# Patient Record
Sex: FEMALE | Race: BLACK OR AFRICAN AMERICAN | Employment: OTHER | URBAN - METROPOLITAN AREA
[De-identification: names, ages, dates, MRNs, and addresses within clinical notes are randomized per-mention and may not be internally consistent; named-entity substitution may affect disease eponyms.]

---

## 2017-02-09 ENCOUNTER — TELEPHONE (OUTPATIENT)
Dept: INTERNAL MEDICINE CLINIC | Age: 62
End: 2017-02-09

## 2017-02-09 NOTE — TELEPHONE ENCOUNTER
Pt's lab were re-ordered in October. [facility] reqs are good for 6 months. Spoke to pt and advised of this information. She verbalized understanding and will be going Jan 16th to have them completed.

## 2017-02-09 NOTE — TELEPHONE ENCOUNTER
Sol Mai 1955     Pt needs a new order for lab work the order she has is .  Pt states that she will be getting her labs done on the

## 2017-03-16 ENCOUNTER — OFFICE VISIT (OUTPATIENT)
Dept: INTERNAL MEDICINE CLINIC | Age: 62
End: 2017-03-16

## 2017-03-16 VITALS
RESPIRATION RATE: 16 BRPM | HEART RATE: 87 BPM | HEIGHT: 63 IN | WEIGHT: 170.2 LBS | SYSTOLIC BLOOD PRESSURE: 130 MMHG | OXYGEN SATURATION: 98 % | TEMPERATURE: 97.9 F | BODY MASS INDEX: 30.16 KG/M2 | DIASTOLIC BLOOD PRESSURE: 88 MMHG

## 2017-03-16 DIAGNOSIS — E78.5 HYPERLIPIDEMIA LDL GOAL <100: ICD-10-CM

## 2017-03-16 DIAGNOSIS — E11.29 UNCONTROLLED TYPE 2 DIABETES MELLITUS WITH MICROALBUMINURIA, UNSPECIFIED LONG TERM INSULIN USE STATUS: Primary | ICD-10-CM

## 2017-03-16 DIAGNOSIS — E11.65 UNCONTROLLED TYPE 2 DIABETES MELLITUS WITH MICROALBUMINURIA, UNSPECIFIED LONG TERM INSULIN USE STATUS: Primary | ICD-10-CM

## 2017-03-16 DIAGNOSIS — E11.29 PERSISTENT MICROALBUMINURIA ASSOCIATED WITH TYPE 2 DIABETES MELLITUS (HCC): ICD-10-CM

## 2017-03-16 DIAGNOSIS — R80.9 UNCONTROLLED TYPE 2 DIABETES MELLITUS WITH MICROALBUMINURIA, UNSPECIFIED LONG TERM INSULIN USE STATUS: Primary | ICD-10-CM

## 2017-03-16 DIAGNOSIS — I10 BENIGN HYPERTENSION: ICD-10-CM

## 2017-03-16 DIAGNOSIS — R80.9 PERSISTENT MICROALBUMINURIA ASSOCIATED WITH TYPE 2 DIABETES MELLITUS (HCC): ICD-10-CM

## 2017-03-16 LAB
ALBUMIN UR QL STRIP: 30 MG/L
CREATININE, URINE POC: 200 MG/DL
MICROALBUMIN/CREAT RATIO POC: <30 MG/G

## 2017-03-16 RX ORDER — METFORMIN HYDROCHLORIDE 1000 MG/1
1000 TABLET ORAL 2 TIMES DAILY WITH MEALS
Qty: 180 TAB | Refills: 1 | Status: SHIPPED | OUTPATIENT
Start: 2017-03-16 | End: 2017-08-26 | Stop reason: SDUPTHER

## 2017-03-16 RX ORDER — METFORMIN HYDROCHLORIDE 1000 MG/1
1000 TABLET ORAL 2 TIMES DAILY WITH MEALS
Qty: 60 TAB | Refills: 0 | Status: SHIPPED | OUTPATIENT
Start: 2017-03-16 | End: 2017-04-15

## 2017-03-16 RX ORDER — PRAVASTATIN SODIUM 80 MG/1
80 TABLET ORAL
Qty: 90 TAB | Refills: 0 | Status: SHIPPED | OUTPATIENT
Start: 2017-03-16 | End: 2017-05-28 | Stop reason: SDUPTHER

## 2017-03-16 NOTE — PATIENT INSTRUCTIONS
Diabetic Nephropathy: Care Instructions  Your Care Instructions  Finding out that your kidneys have been damaged can be very distressing. It may have taken you by surprise, since damage to kidneys usually does not cause symptoms early on. It is normal to feel upset and afraid. Having diabetic nephropathy means that for some time you have had high blood sugar, which damages the kidneys. Healthy kidneys keep protein in your blood, where it belongs. Damaged kidneys do not work the way they should. Your kidneys are letting protein pass into your urine. Sometimes diabetic kidney disease can lead to kidney failure. Your doctor will tell you how you might be able to slow damage to your kidneys. In many cases, prompt and regular treatment can prevent kidney failure. You will need to take medicine and may need to make a number of changes in your normal routines. If you can keep your blood sugar and blood pressure under control and take certain medicines, you may reduce your chance of kidney failure. Follow-up care is a key part of your treatment and safety. Be sure to make and go to all appointments, and call your doctor if you are having problems. It's also a good idea to know your test results and keep a list of the medicines you take. How can you care for yourself at home? · Take your medicines exactly as prescribed. It is very important that you take your insulin or other diabetes medicine as your doctor tells you. Call your doctor if you think you are having a problem with your medicine. · Try to keep blood sugar in your target range. ¨ Eat a variety of foods, with carbohydrate spread out in your meals. Your doctor may restrict your protein. A dietitian can help you plan meals. ¨ If your doctor recommends it, get more exercise. Walking is a good choice. Bit by bit, increase the amount you walk every day. Try for at least 30 minutes on most days of the week.   ¨ Check your blood sugar as often as your doctor recommends. · Take and record your blood pressure at home if your doctor tells you to. Learn the importance of the two measures of blood pressure (such as 130 over 80, or 130/80). To take your blood pressure at home:  ¨ Ask your doctor to check your blood pressure monitor. He or she can make sure that it is accurate and that the cuff fits you. Also ask your doctor to watch you to make sure that you are using it right. ¨ Do not eat, use tobacco products, or use medicine known to raise blood pressure (such as some nasal decongestant sprays) before taking your blood pressure. ¨ Avoid taking your blood pressure if you have just exercised or are nervous or upset. Rest at least 15 minutes before taking a reading. · Eat a low-salt diet to help keep your blood pressure in your target range. · Do not smoke. Smoking raises your risk of many health problems, including diabetic nephropathy. If you need help quitting, talk to your doctor about stop-smoking programs and medicines. These can increase your chances of quitting for good. · Do not take ibuprofen, naproxen, or similar medicines, unless your doctor tells you to. These medicines may make kidney problems worse. When should you call for help? Call 911 anytime you think you may need emergency care. For example, call if:  · You passed out (lost consciousness). Call your doctor now or seek immediate medical care if:  · You have not urinated at all in the last 24 hours. · You have trouble urinating or can urinate only very small amounts. · You are confused or have trouble thinking clearly. · You are very thirsty, lightheaded, or dizzy, or you feel like you may pass out (lose consciousness). · You have a weak, fast heartbeat. · You have swelling in your hands or feet. · You have blood in your urine. · You are extremely tired or weak. Watch closely for changes in your health, and be sure to contact your doctor if you have any problems.   Where can you learn more?  Go to http://shira-romie.info/. Enter P361 in the search box to learn more about \"Diabetic Nephropathy: Care Instructions. \"  Current as of: April 5, 2016  Content Version: 11.1  © 6023-7632 [employer] Pauer - 3P. Care instructions adapted under license by [employer] (which disclaims liability or warranty for this information). If you have questions about a medical condition or this instruction, always ask your healthcare professional. [address] any warranty or liability for your use of this information.

## 2017-03-16 NOTE — PROGRESS NOTES
Chief Complaint   Patient presents with    Diabetes     f/u    Hypertension     f/u     Pt here for follow up.

## 2017-03-16 NOTE — PROGRESS NOTES
HPI:  Anuj Lopez is a 65y.o. year old female who returns to clinic today for routine follow up appointment to discuss the issues below:    She is here for diabetic and htn follow up. Complication - microalbuminuria. No known retinopathy. Mild neuropathy. Reports non adherence to meds - Lantus and Metformin. Hasn't filled in a long while, didn't call for refills after apparently she needed to change to mail order (?). It may have been denied due to lack of lab work/ f/u. She has trouble getting to [facility] in am due to her job - had to take a vacation day to get her most recent labs done. Reports taking pravastatin and lisinopril- HCTZ. Labs reviewed done 2/27. She feels well. No exercise. Works full time at [employer] in [place]. Diet - watches carbohydrates \"sometimes\" but doesn't pay attention when she goes out to eat. Drinks diet drinks. Tries to avoid sweets / snacks but not always. Due for eye exam.      Lab Results  Component Value Date/Time   Hemoglobin A1c 10.1 02/27/2017 08:39 AM   Hemoglobin A1c 13.9 03/30/2016 10:57 AM   Hemoglobin A1c 12.6 04/15/2014 01:41 PM   Glucose 135 02/27/2017 08:39 AM   Glucose  04/15/2014 10:07 AM   Microalbumin/Creat ratio (mg/g creat) 10 01/29/2010 03:49 PM   Microalb/Creat ratio (ug/mg creat.) 44.7 11/14/2014 08:31 AM   Microalbumin,urine random 0.70 01/29/2010 03:49 PM   LDL,Direct 149 06/04/2010 04:14 PM   LDL, calculated 137 02/27/2017 08:39 AM   Creatinine 0.72 02/27/2017 08:39 AM          Prior to Admission medications    Medication Sig Start Date End Date Taking? Authorizing Provider   lisinopril-hydroCHLOROthiazide (PRINZIDE, ZESTORETIC) 20-25 mg per tablet TAKE 1 TABLET DAILY 12/8/16  Yes Nenita Paige MD   pravastatin (PRAVACHOL) 40 mg tablet Take 1 Tab by mouth daily.  4/5/16  Yes [address], MD   insulin glargine (LANTUS SOLOSTAR) 100 unit/mL (3 mL) pen Inject 20 units sq at 9 pm. 4/5/16   [address] [employer], MD   Insulin Needles, Disposable, (BD INSULIN PEN NEEDLE UF MINI) 31 gauge x 3/16\" ndle Use to inject insulin daily. 4/5/16   Theresa Santos MD   metFORMIN (GLUCOPHAGE) 1,000 mg tablet Take 1 Tab by mouth two (2) times daily (with meals). 4/5/16   Theresa Santos MD   Blood-Glucose Meter monitoring kit One Touch Ultra Mini Glucose Meter. Use to check BS daily. 4/5/16   Theresa Santos MD   glucose blood VI test strips (ASCENSIA AUTODISC VI, ONE TOUCH ULTRA TEST VI) strip One Touch Ultra Mini Test Strips. Use to check BS daily. 4/5/16   Theresa Santos MD   Lancets misc Use to check BS daily. 4/5/16   Theresa Santos MD          No Known Allergies        Review of Systems   Constitutional: Negative for malaise/fatigue and weight loss. Eyes: Negative for blurred vision. Respiratory: Negative for shortness of breath. Cardiovascular: Negative for chest pain and palpitations. Gastrointestinal: Negative for abdominal pain, constipation and diarrhea. Genitourinary: Negative for frequency. Neurological: Positive for tingling (toes upon waking in am only). Endo/Heme/Allergies: Negative for polydipsia. Physical Exam   Constitutional: She appears well-nourished. Neck: Carotid bruit is not present. Cardiovascular: Normal rate, regular rhythm and normal heart sounds. No murmur heard. Pulses:       Carotid pulses are 2+ on the right side, and 2+ on the left side. Pulmonary/Chest: Effort normal and breath sounds normal.   Abdominal: Soft. Bowel sounds are normal. There is no hepatosplenomegaly. There is no tenderness. Musculoskeletal: She exhibits no edema. Neurological:   Monofilament intact bilaterally. Pulses R: 2+ and L: 2+. No open wounds. No skin lesions.          Visit Vitals    /88 (BP 1 Location: Left arm, BP Patient Position: Sitting)    Pulse 87    Temp 97.9 °F (36.6 °C) (Oral)    Resp 16    Ht 5' 3\" (1.6 m)    Wt 170 lb 3.2 oz (77.2 kg)    SpO2 98%    BMI 30.15 kg/m2 Assessment & Plan: Lamin Almaraz was seen today for diabetes and hypertension. Diagnoses and all orders for this visit:    Uncontrolled type 2 diabetes mellitus with microalbuminuria, unspecified long term insulin use status ([facility]   Medication non adherence  Discussed importance of regular f/u and labs, discussed options for care closer to her work ([facility] and a closer pcp). Will restart Metformin - sent to both [facility] today  Will not put her back on Lantus at present but instead will start a GLP1 agonist given its positive profile in regards to weight management- she prefers weekly dose so will start Bydureon. Discussed start of new med. -     metFORMIN (GLUCOPHAGE) 1,000 mg tablet; Take 1 Tab by mouth two (2) times daily (with meals) for 30 days. -     metFORMIN (GLUCOPHAGE) 1,000 mg tablet; Take 1 Tab by mouth two (2) times daily (with meals). -     exenatide microspheres (BYDUREON) 2 mg/0.65 mL pnij; 2 mg by SubCUTAneous route every seven (7) days. Persistent microalbuminuria associated with type 2 diabetes mellitus ([facility]  POC Micro < 30 today, previously elevated on prior years studies. Continue Ace Inhibitor  Counseled about this issue and need for improved glucose control in order to prevent recurrence  -     AMB POC URINE, MICROALBUMIN, SEMIQUANT (3 RESULTS)    Benign hypertension  I evaluated and recommended to continue current doses of medications.       Hyperlipidemia LDL goal <100  LDL not at goal.  I would like to change her to atorvastatin but she prefers not to change agents. Will maximize to 80 mg daily. Needs recheck in 3 mo. -     pravastatin (PRAVACHOL) 80 mg tablet; Take 1 Tab by mouth nightly. Follow-up Disposition:  Return in about 3 months (around 6/16/2017) for establish care with new provider for Diabetes.    Advised her to call back or return to office if symptoms worsen/change/persist.  Discussed expected course/resolution/complications of diagnosis in detail with patient. Medication risks/benefits/costs/interactions/alternatives discussed with patient. She was given an after visit summary which includes diagnoses, current medications, & vitals. She expressed understanding with the diagnosis and plan. Tonia Espinoza

## 2017-03-16 NOTE — LETTER
NOTIFICATION RETURN TO WORK / SCHOOL 
 
3/16/2017 4:10 PM 
 
Ms. Cara Delaney [address] [place] [phone] To Whom It May Concern: Cara Delaney is currently under the care of Polina Maloney. She was seen for an office visit today, March 16, 2017. If there are questions or concerns please have the patient contact our office. Sincerely, Bobby Chris MD

## 2017-03-16 NOTE — MR AVS SNAPSHOT
Visit Information Date & Time Provider Department Dept. Phone Encounter #  
 3/16/2017  1:40 PM Ernestina Johnson MD [place] [address] [facility] [phone] Follow-up Instructions Return in about 3 months (around 6/16/2017) for establish care with new provider for Diabetes. Upcoming Health Maintenance Date Due DTaP/Tdap/Td series (1 - Tdap) 1/27/1977 BREAST CANCER SCRN MAMMOGRAM 3/1/2015 EYE EXAM RETINAL OR DILATED Q1 8/1/2015 PAP AKA CERVICAL CYTOLOGY 2/1/2016 FOOT EXAM Q1 3/30/2017 MICROALBUMIN Q1 3/30/2017 HEMOGLOBIN A1C Q6M 8/27/2017 LIPID PANEL Q1 2/27/2018 COLONOSCOPY 2/16/2026 Allergies as of 3/16/2017  Review Complete On: 3/16/2017 By: Melvina Carrillo LPN No Known Allergies Current Immunizations  Reviewed on 3/16/2017 Name Date Pneumococcal Vaccine (Unspecified Type) 6/1/2011 Reviewed by Ernestina Johnson MD on 3/16/2017 at  2:38 PM  
 Reviewed by Ernestina Johnson MD on 3/16/2017 at  2:38 PM  
You Were Diagnosed With   
  
 Codes Comments Uncontrolled type 2 diabetes mellitus with microalbuminuria, unspecified long term insulin use status (HCC)    -  Primary ICD-10-CM: E11.29, R80.9, E11.65 ICD-9-CM: 250.42, 583.81 Persistent microalbuminuria associated with type 2 diabetes mellitus (HCC)     ICD-10-CM: E11.29, R80.9 ICD-9-CM: 250.40, 791.0 Benign hypertension     ICD-10-CM: I10 
ICD-9-CM: 401.1 Hyperlipidemia LDL goal <100     ICD-10-CM: E78.5 ICD-9-CM: 272.4 Hypercholesteremia     ICD-10-CM: E78.00 ICD-9-CM: 272.0 Vitals BP Pulse Temp Resp Height(growth percentile) Weight(growth percentile) 130/88 (BP 1 Location: Left arm, BP Patient Position: Sitting) 87 97.9 °F (36.6 °C) (Oral) 16 5' 3\" (1.6 m) 170 lb 3.2 oz (77.2 kg) SpO2 BMI OB Status Smoking Status 98% 30.15 kg/m2 Postmenopausal Never Smoker BMI and BSA Data Body Mass Index Body Surface Area 30.15 kg/m 2 1.85 m 2 Preferred Pharmacy Pharmacy Name Phone [address] [facility], [address] [phone] Your Updated Medication List  
  
   
This list is accurate as of: 3/16/17  2:44 PM.  Always use your most recent med list.  
  
  
  
  
 Blood-Glucose Meter monitoring kit One Touch Ultra Mini Glucose Meter. Use to check BS daily. exenatide microspheres 2 mg/0.65 mL Pnij Commonly known as:  BYDUREON  
2 mg by SubCUTAneous route every seven (7) days. glucose blood VI test strips strip Commonly known as:  ASCENSIA AUTODISC VI, ONE TOUCH ULTRA TEST VI One Touch Ultra Mini Test Strips. Use to check BS daily. insulin glargine 100 unit/mL (3 mL) pen Commonly known as:  LANTUS SOLOSTAR Inject 20 units sq at 9 pm.  
  
 Insulin Needles (Disposable) 31 gauge x 3/16\" Ndle Commonly known as:  BD INSULIN PEN NEEDLE UF MINI Use to inject insulin daily. Lancets Misc Use to check BS daily. lisinopril-hydroCHLOROthiazide 20-25 mg per tablet Commonly known as:  PRINZIDE, ZESTORETIC  
TAKE 1 TABLET DAILY * metFORMIN 1,000 mg tablet Commonly known as:  GLUCOPHAGE Take 1 Tab by mouth two (2) times daily (with meals) for 30 days. * metFORMIN 1,000 mg tablet Commonly known as:  GLUCOPHAGE Take 1 Tab by mouth two (2) times daily (with meals). pravastatin 40 mg tablet Commonly known as:  PRAVACHOL Take 1 Tab by mouth daily. * Notice: This list has 2 medication(s) that are the same as other medications prescribed for you. Read the directions carefully, and ask your doctor or other care provider to review them with you. Prescriptions Sent to Pharmacy Refills  
 metFORMIN (GLUCOPHAGE) 1,000 mg tablet 0 Sig: Take 1 Tab by mouth two (2) times daily (with meals) for 30 days.   
 Class: Normal  
 Pharmacy: [facility], [address] [place] [address] Ph #: [phone] Route: Oral  
 metFORMIN (GLUCOPHAGE) 1,000 mg tablet 1 Sig: Take 1 Tab by mouth two (2) times daily (with meals). Class: Normal  
 Pharmacy: [address], [address] Ph #: [phone] Route: Oral  
 exenatide microspheres (BYDUREON) 2 mg/0.65 mL pnij 0 Si mg by SubCUTAneous route every seven (7) days. Class: Normal  
 Pharmacy: [facility], [address] [place] [address] Ph #: [phone] Route: SubCUTAneous We Performed the Following AMB POC URINE, MICROALBUMIN, SEMIQUANT (3 RESULTS) [73007 CPT(R)] Follow-up Instructions Return in about 3 months (around 2017) for establish care with new provider for Diabetes. Patient Instructions Diabetic Nephropathy: Care Instructions Your Care Instructions Finding out that your kidneys have been damaged can be very distressing. It may have taken you by surprise, since damage to kidneys usually does not cause symptoms early on. It is normal to feel upset and afraid. Having diabetic nephropathy means that for some time you have had high blood sugar, which damages the kidneys. Healthy kidneys keep protein in your blood, where it belongs. Damaged kidneys do not work the way they should. Your kidneys are letting protein pass into your urine. Sometimes diabetic kidney disease can lead to kidney failure. Your doctor will tell you how you might be able to slow damage to your kidneys. In many cases, prompt and regular treatment can prevent kidney failure. You will need to take medicine and may need to make a number of changes in your normal routines.  If you can keep your blood sugar and blood pressure under control and take certain medicines, you may reduce your chance of kidney failure. Follow-up care is a key part of your treatment and safety. Be sure to make and go to all appointments, and call your doctor if you are having problems. It's also a good idea to know your test results and keep a list of the medicines you take. How can you care for yourself at home? · Take your medicines exactly as prescribed. It is very important that you take your insulin or other diabetes medicine as your doctor tells you. Call your doctor if you think you are having a problem with your medicine. · Try to keep blood sugar in your target range. ¨ Eat a variety of foods, with carbohydrate spread out in your meals. Your doctor may restrict your protein. A dietitian can help you plan meals. ¨ If your doctor recommends it, get more exercise. Walking is a good choice. Bit by bit, increase the amount you walk every day. Try for at least 30 minutes on most days of the week. ¨ Check your blood sugar as often as your doctor recommends. · Take and record your blood pressure at home if your doctor tells you to. Learn the importance of the two measures of blood pressure (such as 130 over 80, or 130/80). To take your blood pressure at home: ¨ Ask your doctor to check your blood pressure monitor. He or she can make sure that it is accurate and that the cuff fits you. Also ask your doctor to watch you to make sure that you are using it right. ¨ Do not eat, use tobacco products, or use medicine known to raise blood pressure (such as some nasal decongestant sprays) before taking your blood pressure. ¨ Avoid taking your blood pressure if you have just exercised or are nervous or upset. Rest at least 15 minutes before taking a reading. · Eat a low-salt diet to help keep your blood pressure in your target range. · Do not smoke. Smoking raises your risk of many health problems, including diabetic nephropathy.  If you need help quitting, talk to your doctor about stop-smoking programs and medicines. These can increase your chances of quitting for good. · Do not take ibuprofen, naproxen, or similar medicines, unless your doctor tells you to. These medicines may make kidney problems worse. When should you call for help? Call 911 anytime you think you may need emergency care. For example, call if: 
· You passed out (lost consciousness). Call your doctor now or seek immediate medical care if: 
· You have not urinated at all in the last 24 hours. · You have trouble urinating or can urinate only very small amounts. · You are confused or have trouble thinking clearly. · You are very thirsty, lightheaded, or dizzy, or you feel like you may pass out (lose consciousness). · You have a weak, fast heartbeat. · You have swelling in your hands or feet. · You have blood in your urine. · You are extremely tired or weak. Watch closely for changes in your health, and be sure to contact your doctor if you have any problems. Where can you learn more? Go to http://shira-romie.info/. Enter P361 in the search box to learn more about \"Diabetic Nephropathy: Care Instructions. \" Current as of: April 5, 2016 Content Version: 11.1 © 2176-5441 [employer]. Care instructions adapted under license by [employer] (which disclaims liability or warranty for this information). If you have questions about a medical condition or this instruction, always ask your healthcare professional. [place] [address] any warranty or liability for your use of this information. Introducing [facility]! [address] introduces [employer]Ed Online Doctor patient portal. Now you can access parts of your medical record, email your doctor's office, and request medication refills online. 1. In your internet browser, go to [email or web address] 2. Click on the First Time User? Click Here link in the Sign In box.  You will see the New Member Sign Up page. 3. Enter your [employer] Access Code exactly as it appears below. You will not need to use this code after youve completed the sign-up process. If you do not sign up before the expiration date, you must request a new code. · wali Access Code: MINISTERIO ROY [facility] Expires: 6/14/2017  2:44 PM 
 
4. Enter the last four digits of your Social Security Number (xxxx) and Date of Birth (mm/dd/yyyy) as indicated and click Submit. You will be taken to the next sign-up page. 5. Create a wali ID. This will be your wali login ID and cannot be changed, so think of one that is secure and easy to remember. 6. Create a wali password. You can change your password at any time. 7. Enter your Password Reset Question and Answer. This can be used at a later time if you forget your password. 8. Enter your e-mail address. You will receive e-mail notification when new information is available in [address]. 9. Click Sign Up. You can now view and download portions of your medical record. 10. Click the Download Summary menu link to download a portable copy of your medical information. If you have questions, please visit the Frequently Asked Questions section of the wali website. Remember, wali is NOT to be used for urgent needs. For medical emergencies, dial 911. Now available from your iPhone and Android! Please provide this summary of care documentation to your next provider. Your primary care clinician is listed as Eh Stahl. If you have any questions after today's visit, please call [phone].

## 2017-03-23 ENCOUNTER — TELEPHONE (OUTPATIENT)
Dept: INTERNAL MEDICINE CLINIC | Age: 62
End: 2017-03-23

## 2017-03-23 NOTE — TELEPHONE ENCOUNTER
Attempted to contact patient without success.  Left voicemail message requesting that patient touch base with her pharmacy to see if they changed manufactures because we haven't made any changes to her prescription

## 2017-03-23 NOTE — TELEPHONE ENCOUNTER
Patient states the needles for her insulin are too big, she was getting a smaller size needle, and would like to know if she can have those needles refilled, please send script to [facility] on file.

## 2017-04-13 DIAGNOSIS — R80.9 UNCONTROLLED TYPE 2 DIABETES MELLITUS WITH MICROALBUMINURIA, UNSPECIFIED LONG TERM INSULIN USE STATUS: ICD-10-CM

## 2017-04-13 DIAGNOSIS — E11.65 UNCONTROLLED TYPE 2 DIABETES MELLITUS WITH MICROALBUMINURIA, UNSPECIFIED LONG TERM INSULIN USE STATUS: ICD-10-CM

## 2017-04-13 DIAGNOSIS — E11.29 UNCONTROLLED TYPE 2 DIABETES MELLITUS WITH MICROALBUMINURIA, UNSPECIFIED LONG TERM INSULIN USE STATUS: ICD-10-CM

## 2017-04-13 NOTE — TELEPHONE ENCOUNTER
Requested Prescriptions     Pending Prescriptions Disp Refills    exenatide microspheres (BYDUREON) 2 mg/0.65 mL pnij 2.6 mL 0     Si mg by SubCUTAneous route every seven (7) days.        Pt states that she is completely out of medication      Last OV: 3/16/17  Next OV N/A

## 2017-04-14 NOTE — TELEPHONE ENCOUNTER
Last office visit 3/16/17  No upcomming appointment on file. Attempted to contact patient without success.  Left voicemail message requesting a call back to the office to schedule follow up with new provider in June

## 2017-05-28 DIAGNOSIS — E78.5 HYPERLIPIDEMIA LDL GOAL <100: ICD-10-CM

## 2017-05-29 RX ORDER — PRAVASTATIN SODIUM 80 MG/1
TABLET ORAL
Qty: 90 TAB | Refills: 0 | Status: SHIPPED | OUTPATIENT
Start: 2017-05-29 | End: 2017-08-28 | Stop reason: SDUPTHER

## 2017-06-14 ENCOUNTER — OFFICE VISIT (OUTPATIENT)
Dept: INTERNAL MEDICINE CLINIC | Age: 62
End: 2017-06-14

## 2017-06-14 VITALS
BODY MASS INDEX: 28.63 KG/M2 | RESPIRATION RATE: 18 BRPM | HEART RATE: 99 BPM | OXYGEN SATURATION: 99 % | TEMPERATURE: 99.1 F | HEIGHT: 63 IN | WEIGHT: 161.6 LBS | SYSTOLIC BLOOD PRESSURE: 150 MMHG | DIASTOLIC BLOOD PRESSURE: 84 MMHG

## 2017-06-14 DIAGNOSIS — E78.5 HYPERLIPIDEMIA LDL GOAL <100: ICD-10-CM

## 2017-06-14 DIAGNOSIS — I10 BENIGN HYPERTENSION: ICD-10-CM

## 2017-06-14 LAB — HBA1C MFR BLD HPLC: 7.3 %

## 2017-06-14 NOTE — PATIENT INSTRUCTIONS
Follow a no concentrated sweets, controlled carbohydrate diet. Get regular aerobic exercise. Continue your current medications. Monitor your blood sugars at home. Get regular eye exams. Check your feet carefully morning and night.

## 2017-06-14 NOTE — MR AVS SNAPSHOT
Visit Information Date & Time Provider Department Dept. Phone Encounter #  
 6/14/2017  2:15 PM [place] [address] [facility] [phone] [id number] Follow-up Instructions Return in about 3 months (around 9/14/2017) for F/u DM. Upcoming Health Maintenance Date Due DTaP/Tdap/Td series (1 - Tdap) 1/27/1977 BREAST CANCER SCRN MAMMOGRAM 3/1/2015 EYE EXAM RETINAL OR DILATED Q1 8/1/2015 PAP AKA CERVICAL CYTOLOGY 2/1/2016 INFLUENZA AGE 9 TO ADULT 8/1/2017 HEMOGLOBIN A1C Q6M 8/27/2017 LIPID PANEL Q1 2/27/2018 MICROALBUMIN Q1 3/16/2018 FOOT EXAM Q1 6/14/2018 COLONOSCOPY 2/16/2026 Allergies as of 6/14/2017  Review Complete On: 6/14/2017 By: Pravin Weldon MD  
 No Known Allergies Current Immunizations  Reviewed on 3/16/2017 Name Date Pneumococcal Vaccine (Unspecified Type) 6/1/2011 Not reviewed this visit You Were Diagnosed With   
  
 Codes Comments Uncontrolled type 2 diabetes mellitus with microalbuminuria, without long-term current use of insulin (HCC)    -  Primary ICD-10-CM: E11.29, E11.65, R80.9 ICD-9-CM: 250.42, 791.0 Benign hypertension     ICD-10-CM: I10 
ICD-9-CM: 401.1 Hyperlipidemia LDL goal <100     ICD-10-CM: E78.5 ICD-9-CM: 272.4 Vitals BP Pulse Temp Resp Height(growth percentile) Weight(growth percentile) 150/84 (BP 1 Location: Left arm, BP Patient Position: Sitting) 99 99.1 °F (37.3 °C) (Oral) 18 5' 3\" (1.6 m) 161 lb 9.6 oz (73.3 kg) SpO2 BMI OB Status Smoking Status 99% 28.63 kg/m2 Postmenopausal Never Smoker Vitals History BMI and BSA Data Body Mass Index Body Surface Area  
 28.63 kg/m 2 1.81 m 2 Preferred Pharmacy Pharmacy Name Phone [address] [facility] [phone] Your Updated Medication List  
  
   
 This list is accurate as of: 17  2:53 PM.  Always use your most recent med list.  
  
  
  
  
 Blood-Glucose Meter monitoring kit One Touch Ultra Mini Glucose Meter. Use to check BS daily. exenatide microspheres 2 mg/0.65 mL Pnij Commonly known as:  BYDUREON  
2 mg by SubCUTAneous route every seven (7) days. glucose blood VI test strips strip Commonly known as:  ASCENSIA AUTODISC VI, ONE TOUCH ULTRA TEST VI One Touch Ultra Mini Test Strips. Use to check BS daily. insulin glargine 100 unit/mL (3 mL) Inpn Commonly known as:  Jolie Arteaga Inject 20 units sq at 9 pm.  
  
 Insulin Needles (Disposable) 31 gauge x 3/16\" Ndle Commonly known as:  BD INSULIN PEN NEEDLE UF MINI Use to inject insulin daily. Lancets Misc Use to check BS daily. lisinopril-hydroCHLOROthiazide 20-25 mg per tablet Commonly known as:  PRINZIDE, ZESTORETIC  
TAKE 1 TABLET DAILY  
  
 metFORMIN 1,000 mg tablet Commonly known as:  GLUCOPHAGE Take 1 Tab by mouth two (2) times daily (with meals). pravastatin 80 mg tablet Commonly known as:  PRAVACHOL  
TAKE 1 TABLET NIGHTLY Prescriptions Sent to Pharmacy Refills  
 exenatide microspheres (BYDUREON) 2 mg/0.65 mL pnij 3 Si mg by SubCUTAneous route every seven (7) days. Class: Normal  
 Pharmacy: [address], [address] [place] [address] Ph #: [phone] Route: SubCUTAneous We Performed the Following AMB POC HEMOGLOBIN A1C [09309 CPT(R)]  DIABETES FOOT EXAM [7 Custom] Follow-up Instructions Return in about 3 months (around 2017) for F/u DM. Patient Instructions Follow a no concentrated sweets, controlled carbohydrate diet. Get regular aerobic exercise. Continue your current medications. Monitor your blood sugars at home. Get regular eye exams. Check your feet carefully morning and night. Introducing [facility]! Ange Rivera introduces [employer] patient portal. Now you can access parts of your medical record, email your doctor's office, and request medication refills online. 1. In your internet browser, go to [email or web address] 2. Click on the First Time User? Click Here link in the Sign In box. You will see the New Member Sign Up page. 3. Enter your Mind[employer] Access Code exactly as it appears below. You will not need to use this code after youve completed the sign-up process. If you do not sign up before the expiration date, you must request a new code. · Mind[employer] Access Code: RUDOLPH CHEN [facility] Expires: 9/12/2017  2:49 PM 
 
4. Enter the last four digits of your Social Security Number (xxxx) and Date of Birth (mm/dd/yyyy) as indicated and click Submit. You will be taken to the next sign-up page. 5. Create a MindOps ID. This will be your MindOps login ID and cannot be changed, so think of one that is secure and easy to remember. 6. Create a Mind[employer] password. You can change your password at any time. 7. Enter your Password Reset Question and Answer. This can be used at a later time if you forget your password. 8. Enter your e-mail address. You will receive e-mail notification when new information is available in [address] 9. Click Sign Up. You can now view and download portions of your medical record. 10. Click the Download Summary menu link to download a portable copy of your medical information. If you have questions, please visit the Frequently Asked Questions section of the Mind[employer] website. Remember, Mind[employer] is NOT to be used for urgent needs. For medical emergencies, dial 911. Now available from your iPhone and Android! Please provide this summary of care documentation to your next provider. Your primary care clinician is listed as Rafael Quinteros. If you have any questions after today's visit, please call [phone].

## 2017-06-14 NOTE — LETTER
NOTIFICATION RETURN TO WORK / SCHOOL 
 
6/14/2017 2:46 PM 
 
 
Ms. Tomás Davison [address] [phone] To Whom It May Concern: Tomás Davison is currently under the care of Polina Maloney. She will return to work/school on:  6/15/2017 Patient seen today at 2:15 - 3:15. If there are questions or concerns please have the patient contact our office. Sincerely, Margrette Opitz, MD

## 2017-06-14 NOTE — PROGRESS NOTES
Chief Complaint   Patient presents with   [facility] Care     Reviewed record in preparation for visit and have obtained necessary documentation. Identified pt with two pt identifiers(name and ). Health Maintenance Due   Topic    DTaP/Tdap/Td series (1 - Tdap)    BREAST CANCER SCRN MAMMOGRAM     EYE EXAM RETINAL OR DILATED Q1     PAP AKA CERVICAL CYTOLOGY     FOOT EXAM Q1          Chief Complaint   Patient presents with   [facility] Establish Care        Wt Readings from Last 3 Encounters:   17 161 lb 9.6 oz (73.3 kg)   17 170 lb 3.2 oz (77.2 kg)   10/12/16 169 lb 6.4 oz (76.8 kg)     Temp Readings from Last 3 Encounters:   17 99.1 °F (37.3 °C) (Oral)   17 97.9 °F (36.6 °C) (Oral)   10/12/16 98.2 °F (36.8 °C) (Oral)     BP Readings from Last 3 Encounters:   17 150/84   17 130/88   10/12/16 154/90     Pulse Readings from Last 3 Encounters:   17 99   17 87   10/12/16 91           Learning Assessment:  :     Learning Assessment 3/30/2016 2015 4/15/2014 2013   PRIMARY LEARNER Patient Patient Patient Patient   HIGHEST LEVEL OF EDUCATION - PRIMARY LEARNER  SOME COLLEGE SOME COLLEGE SOME COLLEGE -   BARRIERS PRIMARY LEARNER NONE NONE NONE -   CO-LEARNER CAREGIVER No No No -   PRIMARY LANGUAGE ENGLISH ENGLISH ENGLISH ENGLISH    NEED No No No -   LEARNER PREFERENCE PRIMARY DEMONSTRATION DEMONSTRATION DEMONSTRATION LISTENING   LEARNING SPECIAL TOPICS no no no -   ANSWERED BY patient patient patient patient   RELATIONSHIP SELF SELF SELF SELF       Depression Screening:  :     PHQ over the last two weeks 3/30/2016   Little interest or pleasure in doing things Not at all   Feeling down, depressed or hopeless Not at all   Total Score PHQ 2 0       Fall Risk Assessment:  :     No flowsheet data found. Abuse Screening:  :     Abuse Screening Questionnaire 3/30/2016 2015 4/15/2014   Do you ever feel afraid of your partner?  N N N   Are you in a relationship with someone who physically or mentally threatens you? N N N   Is it safe for you to go home? Keila Brown       Coordination of Care Questionnaire:  :     1) Have you been to an emergency room, urgent care clinic since your last visit? yes   Hospitalized since your last visit? no             2) Have you seen or consulted any other health care providers outside of [address] [place] [address] since your last visit? yes  (Include any pap smears or colon screenings in this section.)    3) Do you have an Advance Directive on file? no    4) Are you interested in receiving information on Advance Directives? NO      Patient is accompanied by self I have received verbal consent from Sherry Short to discuss any/all medical information while they are present in the room. Reviewed record  In preparation for visit and have obtained necessary documentation.

## 2017-06-14 NOTE — PROGRESS NOTES
Subjective:     Chief Complaint   Patient presents with   [facility].     She  is a 65y.o. year old female who presents for evaluation. New patient to me. History of poorly controlled DM / HTN and HLD. Checking BS once a week. 136 last week. Not watching diet well. Not exercising. Started on Bydureon last OV. Historical Data    Past Medical History:   Diagnosis Date    Diabetes ([address]     Hypercholesterolemia     Hypertension         Past Surgical History:   Procedure Laterality Date    HX BREAST BIOPSY Right         Outpatient Encounter Prescriptions as of 6/14/2017   Medication Sig Dispense Refill    pravastatin (PRAVACHOL) 80 mg tablet TAKE 1 TABLET NIGHTLY 90 Tab 0    exenatide microspheres (BYDUREON) 2 mg/0.65 mL pnij 2 mg by SubCUTAneous route every seven (7) days. 2.6 mL 3    metFORMIN (GLUCOPHAGE) 1,000 mg tablet Take 1 Tab by mouth two (2) times daily (with meals). 180 Tab 1    lisinopril-hydroCHLOROthiazide (PRINZIDE, ZESTORETIC) 20-25 mg per tablet TAKE 1 TABLET DAILY 30 Tab 0    Insulin Needles, Disposable, (BD INSULIN PEN NEEDLE UF MINI) 31 gauge x 3/16\" ndle Use to inject insulin daily. 100 Pen Needle 3    Blood-Glucose Meter monitoring kit One Touch Ultra Mini Glucose Meter. Use to check BS daily. 1 Kit 0    glucose blood VI test strips (ASCENSIA AUTODISC VI, ONE TOUCH ULTRA TEST VI) strip One Touch Ultra Mini Test Strips. Use to check BS daily. 100 Strip 3    Lancets misc Use to check BS daily. 100 Each 3    insulin glargine (LANTUS SOLOSTAR) 100 unit/mL (3 mL) pen Inject 20 units sq at 9 pm. 15 mL 3     No facility-administered encounter medications on file as of 6/14/2017.          No Known Allergies     Social History     Social History    Marital status: SINGLE     Spouse name: N/A    Number of children: N/A    Years of education: N/A     Occupational History     8-4 pm      [employer]      Social History Main Topics    Smoking status: Never Smoker    Smokeless tobacco: Never Used    Alcohol use 0.0 oz/week     0 Standard drinks or equivalent per week    Drug use: No    Sexual activity: Yes     Partners: Male     Other Topics Concern    Not on file     Social History Narrative    , two sons and two grandchildren 26 yo grandson and 10 yo grandson whom she cares for full time. Review of Systems  A comprehensive review of systems was negative except for that written in the HPI. Objective:     Vitals:    06/14/17 1428   BP: 150/84   Pulse: 99   Resp: 18   Temp: 99.1 °F (37.3 °C)   TempSrc: Oral   SpO2: 99%   Weight: 161 lb 9.6 oz (73.3 kg)   Height: 5' 3\" (1.6 m)     Pleasant AAF in no acute distress. Neck: No masses. Cardiac:  RRR without murmurs, gallops or rubs. Lungs: Clear to auscultation. Abdomen:  Soft and non-tender  Extremities:  No edema. Diabetic foot exam:     Left: Reflexes 1+     Vibratory sensation diminished    Proprioception normal   Sharp/dull discrimination normal    Filament test normal sensation with micro filament   Pulse DP: 2+ (normal)   Pulse PT: 2+ (normal)   Deformities: None  Right: Reflexes 1+   Vibratory sensation diminished   Proprioception normal   Sharp/dull discrimination normal   Filament test normal sensation with micro filament   Pulse DP: 2+ (normal)   Pulse PT: 2+ (normal)   Deformities: None  Results for orders placed or performed in visit on 06/14/17   AMB POC HEMOGLOBIN A1C   Result Value Ref Range    Hemoglobin A1c (POC) 7.3 %         ASSESSMENT / PLAN:   1. Uncontrolled type 2 diabetes mellitus with microalbuminuria, without long-term current use of insulin (HCC)  · No concentrated sweets diet. · Regular physical activity. · Continue current medical regimen. · On ACEI and statin. · Patient is congratulated on improved control. -  DIABETES FOOT EXAM  - AMB POC HEMOGLOBIN A1C  - exenatide microspheres (BYDUREON) 2 mg/0.65 mL pnij; 2 mg by SubCUTAneous route every seven (7) days. Dispense: 2.6 mL; Refill: 3    2. Benign hypertension  · Low salt diet. · Weight control. 3. Hyperlipidemia LDL goal <100  · Continue statin. Patient Instructions   Follow a no concentrated sweets, controlled carbohydrate diet. Get regular aerobic exercise. Continue your current medications. Monitor your blood sugars at home. Get regular eye exams. Check your feet carefully morning and night. Follow-up Disposition:  Return in about 3 months (around 9/14/2017) for F/u DM. Advised her to call back or return to office if symptoms worsen/change/persist.  Discussed expected course/resolution/complications of diagnosis in detail with patient. Medication risks/benefits/costs/interactions/alternatives discussed with patient. She was given an after visit summary which includes diagnoses, current medications, & vitals. She expressed understanding with the diagnosis and plan.

## 2017-08-26 DIAGNOSIS — R80.9 UNCONTROLLED TYPE 2 DIABETES MELLITUS WITH MICROALBUMINURIA, UNSPECIFIED LONG TERM INSULIN USE STATUS: ICD-10-CM

## 2017-08-26 DIAGNOSIS — E11.65 UNCONTROLLED TYPE 2 DIABETES MELLITUS WITH MICROALBUMINURIA, UNSPECIFIED LONG TERM INSULIN USE STATUS: ICD-10-CM

## 2017-08-26 DIAGNOSIS — E11.29 UNCONTROLLED TYPE 2 DIABETES MELLITUS WITH MICROALBUMINURIA, UNSPECIFIED LONG TERM INSULIN USE STATUS: ICD-10-CM

## 2017-08-28 DIAGNOSIS — E78.5 HYPERLIPIDEMIA LDL GOAL <100: ICD-10-CM

## 2017-08-30 RX ORDER — METFORMIN HYDROCHLORIDE 1000 MG/1
TABLET ORAL
Qty: 180 TAB | Refills: 1 | Status: SHIPPED | OUTPATIENT
Start: 2017-08-30 | End: 2018-02-26 | Stop reason: SDUPTHER

## 2017-08-30 RX ORDER — PRAVASTATIN SODIUM 80 MG/1
TABLET ORAL
Qty: 90 TAB | Refills: 2 | Status: SHIPPED | OUTPATIENT
Start: 2017-08-30 | End: 2018-05-27 | Stop reason: SDUPTHER

## 2017-09-15 ENCOUNTER — OFFICE VISIT (OUTPATIENT)
Dept: INTERNAL MEDICINE CLINIC | Age: 62
End: 2017-09-15

## 2017-09-15 VITALS
RESPIRATION RATE: 16 BRPM | SYSTOLIC BLOOD PRESSURE: 140 MMHG | DIASTOLIC BLOOD PRESSURE: 76 MMHG | TEMPERATURE: 98.3 F | OXYGEN SATURATION: 99 % | HEIGHT: 63 IN | BODY MASS INDEX: 29.27 KG/M2 | HEART RATE: 97 BPM | WEIGHT: 165.2 LBS

## 2017-09-15 DIAGNOSIS — I10 BENIGN HYPERTENSION: ICD-10-CM

## 2017-09-15 DIAGNOSIS — E78.5 HYPERLIPIDEMIA LDL GOAL <100: ICD-10-CM

## 2017-09-15 DIAGNOSIS — Z23 ENCOUNTER FOR IMMUNIZATION: ICD-10-CM

## 2017-09-15 NOTE — PATIENT INSTRUCTIONS
Vaccine Information Statement    Influenza (Flu) Vaccine (Inactivated or Recombinant): What you need to know    Many Vaccine Information Statements are available in [place]ian and other languages. See www.immunize.org/vis  Hojas de Información Sobre Vacunas están disponibles en Español y en muchos otros idiomas. Visite www.immunize.org/vis    1. Why get vaccinated? Influenza (flu) is a contagious disease that spreads around the [place] every year, usually between October and May. Flu is caused by influenza viruses, and is spread mainly by coughing, sneezing, and close contact. Anyone can get flu. Flu strikes suddenly and can last several days. Symptoms vary by age, but can include:   fever/chills   sore throat   muscle aches   fatigue   cough   headache    runny or stuffy nose    Flu can also lead to pneumonia and blood infections, and cause diarrhea and seizures in children. If you have a medical condition, such as heart or lung disease, flu can make it worse. Flu is more dangerous for some people. Infants and young children, people 72years of age and older, pregnant women, and people with certain health conditions or a weakened immune system are at greatest risk. Each year thousands of people in the [facility] die from flu, and many more are hospitalized. Flu vaccine can:   keep you from getting flu,   make flu less severe if you do get it, and   keep you from spreading flu to your family and other people. 2. Inactivated and recombinant flu vaccines    A dose of flu vaccine is recommended every flu season. Children 6 months through 6years of age may need two doses during the same flu season. Everyone else needs only one dose each flu season.        Some inactivated flu vaccines contain a very small amount of a mercury-based preservative called thimerosal. Studies have not shown thimerosal in vaccines to be harmful, but flu vaccines that do not contain thimerosal are available. There is no live flu virus in flu shots. They cannot cause the flu. There are many flu viruses, and they are always changing. Each year a new flu vaccine is made to protect against three or four viruses that are likely to cause disease in the upcoming flu season. But even when the vaccine doesnt exactly match these viruses, it may still provide some protection    Flu vaccine cannot prevent:   flu that is caused by a virus not covered by the vaccine, or   illnesses that look like flu but are not. It takes about 2 weeks for protection to develop after vaccination, and protection lasts through the flu season. 3. Some people should not get this vaccine    Tell the person who is giving you the vaccine:     If you have any severe, life-threatening allergies. If you ever had a life-threatening allergic reaction after a dose of flu vaccine, or have a severe allergy to any part of this vaccine, you may be advised not to get vaccinated. Most, but not all, types of flu vaccine contain a small amount of egg protein.  If you ever had Guillain-Barré Syndrome (also called GBS). Some people with a history of GBS should not get this vaccine. This should be discussed with your doctor.  If you are not feeling well. It is usually okay to get flu vaccine when you have a mild illness, but you might be asked to come back when you feel better. 4. Risks of a vaccine reaction    With any medicine, including vaccines, there is a chance of reactions. These are usually mild and go away on their own, but serious reactions are also possible. Most people who get a flu shot do not have any problems with it.      Minor problems following a flu shot include:    soreness, redness, or swelling where the shot was given     hoarseness   sore, red or itchy eyes   cough   fever   aches   headache   itching   fatigue  If these problems occur, they usually begin soon after the shot and last 1 or 2 days. More serious problems following a flu shot can include the following:     There may be a small increased risk of Guillain-Barré Syndrome (GBS) after inactivated flu vaccine. This risk has been estimated at 1 or 2 additional cases per million people vaccinated. This is much lower than the risk of severe complications from flu, which can be prevented by flu vaccine.  Young children who get the flu shot along with pneumococcal vaccine (PCV13) and/or DTaP vaccine at the same time might be slightly more likely to have a seizure caused by fever. Ask your doctor for more information. Tell your doctor if a child who is getting flu vaccine has ever had a seizure. Problems that could happen after any injected vaccine:      People sometimes faint after a medical procedure, including vaccination. Sitting or lying down for about 15 minutes can help prevent fainting, and injuries caused by a fall. Tell your doctor if you feel dizzy, or have vision changes or ringing in the ears.  Some people get severe pain in the shoulder and have difficulty moving the arm where a shot was given. This happens very rarely.  Any medication can cause a severe allergic reaction. Such reactions from a vaccine are very rare, estimated at about 1 in a million doses, and would happen within a few minutes to a few hours after the vaccination. As with any medicine, there is a very remote chance of a vaccine causing a serious injury or death. The safety of vaccines is always being monitored. For more information, visit: www.cdc.gov/vaccinesafety/    5. What if there is a serious reaction? What should I look for?  Look for anything that concerns you, such as signs of a severe allergic reaction, very high fever, or unusual behavior.     Signs of a severe allergic reaction can include hives, swelling of the face and throat, difficulty breathing, a fast heartbeat, dizziness, and weakness - usually within a few minutes to a few hours after the vaccination. What should I do?  If you think it is a severe allergic reaction or other emergency that cant wait, call 9-1-1 and get the person to the nearest hospital. Otherwise, call your doctor.  Reactions should be reported to the Vaccine Adverse Event Reporting System (VAERS). Your doctor should file this report, or you can do it yourself through  the VAERS web site at www.vaers. [facility].gov, or by calling 3-543.298.2877. VAERS does not give medical advice. 6. The National Vaccine Injury Compensation Program    The [facility] Vaccine Injury Compensation Program (VICP) is a federal program that was created to compensate people who may have been injured by certain vaccines. Persons who believe they may have been injured by a vaccine can learn about the program and about filing a claim by calling 3-614.332.2460 or visiting the [employer] website at www.[facility].gov/vaccinecompensation. There is a time limit to file a claim for compensation. 7. How can I learn more?  Ask your healthcare provider. He or she can give you the vaccine package insert or suggest other sources of information.  Call your local or state health department.  Contact the Centers for Disease Control and Prevention (CDC):  - Call 7-190.679.8586 (1-800-CDC-INFO) or  - Visit CDCs website at www.cdc.gov/flu    Vaccine Information Statement   Inactivated Influenza Vaccine   8/7/2015  42 JG Cade [id number]    Department of Health and Human Services  Centers for Disease Control and Prevention    Office Use Only      Follow a no concentrated sweets diet. Stay physically active. Continue your current medications.

## 2017-09-15 NOTE — PROGRESS NOTES
Subjective:     Chief Complaint   Patient presents with    Diabetes     She  is a 65y.o. year old female who presents for evaluation. Diet is not consistent. Takes medication daily. Needs insulin. No visual changes. No burning in feet. No CP. Historical Data    Past Medical History:   Diagnosis Date    Diabetes ([address]     Hypercholesterolemia     Hypertension         Past Surgical History:   Procedure Laterality Date    HX BREAST BIOPSY Right         Outpatient Encounter Prescriptions as of 9/15/2017   Medication Sig Dispense Refill    metFORMIN (GLUCOPHAGE) 1,000 mg tablet TAKE 1 TABLET TWICE A DAY WITH MEALS 180 Tab 1    pravastatin (PRAVACHOL) 80 mg tablet TAKE 1 TABLET NIGHTLY 90 Tab 2    exenatide microspheres (BYDUREON) 2 mg/0.65 mL pnij 2 mg by SubCUTAneous route every seven (7) days. 2.6 mL 3    lisinopril-hydroCHLOROthiazide (PRINZIDE, ZESTORETIC) 20-25 mg per tablet TAKE 1 TABLET DAILY 30 Tab 0    Insulin Needles, Disposable, (BD INSULIN PEN NEEDLE UF MINI) 31 gauge x 3/16\" ndle Use to inject insulin daily. 100 Pen Needle 3    glucose blood VI test strips (ASCENSIA AUTODISC VI, ONE TOUCH ULTRA TEST VI) strip One Touch Ultra Mini Test Strips. Use to check BS daily. 100 Strip 3    Lancets misc Use to check BS daily. 100 Each 3    Blood-Glucose Meter monitoring kit One Touch Ultra Mini Glucose Meter. Use to check BS daily. 1 Kit 0     No facility-administered encounter medications on file as of 9/15/2017.          No Known Allergies     Social History     Social History    Marital status: SINGLE     Spouse name: N/A    Number of children: N/A    Years of education: N/A     Occupational History     8-4 pm      [employer]      Social History Main Topics    Smoking status: Never Smoker    Smokeless tobacco: Never Used    Alcohol use 0.0 oz/week     0 Standard drinks or equivalent per week    Drug use: No    Sexual activity: Yes     Partners: Male     Other Topics Concern    Not on file     Social History Narrative    , two sons and two grandchildren 24 yo grandson and 10 yo grandson whom she cares for full time. Review of Systems  Pertinent items are noted in HPI. Objective:     Vitals:    09/15/17 1441   BP: 140/76   Pulse: 97   Resp: 16   Temp: 98.3 °F (36.8 °C)   TempSrc: Oral   SpO2: 99%   Weight: 165 lb 3.2 oz (74.9 kg)   Height: 5' 3\" (1.6 m)     Pleasant AAF in no acute distress. Neck: Supple. Cardiac: RRR without murmurs, gallops or rubs. Lungs: Clear to auscultation. Extremities: No edema. ASSESSMENT / PLAN:   1. Uncontrolled type 2 diabetes mellitus with microalbuminuria, without long-term current use of insulin ([address]  · Stricter diabetic diet recommended. · On ACEI and statin. · Continue metformin and Bydureon. · Regular eye exams. 2. Benign hypertension  · Satisfactory control. · Continue lisinopril-HCTZ    3. Hyperlipidemia LDL goal <100  · Continue pravastatin. 4. Encounter for immunization    - NE IMMUNIZ ADMIN,1 SINGLE/COMB VAC/TOXOID  - Influenza virus vaccine (QUADRIVALENT PRES FREE SYRINGE) IM (08967)    Patient Instructions     Vaccine Information Statement    Influenza (Flu) Vaccine (Inactivated or Recombinant): What you need to know    Many Vaccine Information Statements are available in [place] and other languages. See www.immunize.org/vis  Hojas de Información Sobre Vacunas están disponibles en Español y en muchos otros idiomas. Visite www.immunize.org/vis    1. Why get vaccinated? Influenza (flu) is a contagious disease that spreads around the [place] every year, usually between October and May. Flu is caused by influenza viruses, and is spread mainly by coughing, sneezing, and close contact. Anyone can get flu. Flu strikes suddenly and can last several days.  Symptoms vary by age, but can include:   fever/chills   sore throat   muscle aches   fatigue   cough   headache    runny or stuffy nose    Flu can also lead to pneumonia and blood infections, and cause diarrhea and seizures in children. If you have a medical condition, such as heart or lung disease, flu can make it worse. Flu is more dangerous for some people. Infants and young children, people 72years of age and older, pregnant women, and people with certain health conditions or a weakened immune system are at greatest risk. Each year thousands of people in the [facility] die from flu, and many more are hospitalized. Flu vaccine can:   keep you from getting flu,   make flu less severe if you do get it, and   keep you from spreading flu to your family and other people. 2. Inactivated and recombinant flu vaccines    A dose of flu vaccine is recommended every flu season. Children 6 months through 6years of age may need two doses during the same flu season. Everyone else needs only one dose each flu season. Some inactivated flu vaccines contain a very small amount of a mercury-based preservative called thimerosal. Studies have not shown thimerosal in vaccines to be harmful, but flu vaccines that do not contain thimerosal are available. There is no live flu virus in flu shots. They cannot cause the flu. There are many flu viruses, and they are always changing. Each year a new flu vaccine is made to protect against three or four viruses that are likely to cause disease in the upcoming flu season. But even when the vaccine doesnt exactly match these viruses, it may still provide some protection    Flu vaccine cannot prevent:   flu that is caused by a virus not covered by the vaccine, or   illnesses that look like flu but are not. It takes about 2 weeks for protection to develop after vaccination, and protection lasts through the flu season. 3. Some people should not get this vaccine    Tell the person who is giving you the vaccine:     If you have any severe, life-threatening allergies.     If you ever had a life-threatening allergic reaction after a dose of flu vaccine, or have a severe allergy to any part of this vaccine, you may be advised not to get vaccinated. Most, but not all, types of flu vaccine contain a small amount of egg protein.  If you ever had Guillain-Barré Syndrome (also called GBS). Some people with a history of GBS should not get this vaccine. This should be discussed with your doctor.  If you are not feeling well. It is usually okay to get flu vaccine when you have a mild illness, but you might be asked to come back when you feel better. 4. Risks of a vaccine reaction    With any medicine, including vaccines, there is a chance of reactions. These are usually mild and go away on their own, but serious reactions are also possible. Most people who get a flu shot do not have any problems with it. Minor problems following a flu shot include:    soreness, redness, or swelling where the shot was given     hoarseness   sore, red or itchy eyes   cough   fever   aches   headache   itching   fatigue  If these problems occur, they usually begin soon after the shot and last 1 or 2 days. More serious problems following a flu shot can include the following:     There may be a small increased risk of Guillain-Barré Syndrome (GBS) after inactivated flu vaccine. This risk has been estimated at 1 or 2 additional cases per million people vaccinated. This is much lower than the risk of severe complications from flu, which can be prevented by flu vaccine.  Young children who get the flu shot along with pneumococcal vaccine (PCV13) and/or DTaP vaccine at the same time might be slightly more likely to have a seizure caused by fever. Ask your doctor for more information. Tell your doctor if a child who is getting flu vaccine has ever had a seizure.      Problems that could happen after any injected vaccine:      People sometimes faint after a medical procedure, including vaccination. Sitting or lying down for about 15 minutes can help prevent fainting, and injuries caused by a fall. Tell your doctor if you feel dizzy, or have vision changes or ringing in the ears.  Some people get severe pain in the shoulder and have difficulty moving the arm where a shot was given. This happens very rarely.  Any medication can cause a severe allergic reaction. Such reactions from a vaccine are very rare, estimated at about 1 in a million doses, and would happen within a few minutes to a few hours after the vaccination. As with any medicine, there is a very remote chance of a vaccine causing a serious injury or death. The safety of vaccines is always being monitored. For more information, visit: www.cdc.gov/vaccinesafety/    5. What if there is a serious reaction? What should I look for?  Look for anything that concerns you, such as signs of a severe allergic reaction, very high fever, or unusual behavior. Signs of a severe allergic reaction can include hives, swelling of the face and throat, difficulty breathing, a fast heartbeat, dizziness, and weakness - usually within a few minutes to a few hours after the vaccination. What should I do?  If you think it is a severe allergic reaction or other emergency that cant wait, call 9-1-1 and get the person to the nearest hospital. Otherwise, call your doctor.  Reactions should be reported to the Vaccine Adverse Event Reporting System (VAERS). Your doctor should file this report, or you can do it yourself through  the VAERS web site at www.vaers. hhs.gov, or by calling 9-510.396.5662. VAERS does not give medical advice. 6. The National Vaccine Injury Compensation Program    The [facility] Vaccine Injury Compensation Program (VICP) is a federal program that was created to compensate people who may have been injured by certain vaccines.     Persons who believe they may have been injured by a vaccine can learn about the program and about filing a claim by calling 8-145.214.3245 or visiting the [employer] website at www.[facility].gov/vaccinecompensation. There is a time limit to file a claim for compensation. 7. How can I learn more?  Ask your healthcare provider. He or she can give you the vaccine package insert or suggest other sources of information.  Call your local or state health department.  Contact the Centers for Disease Control and Prevention (CDC):  - Call 4-166.606.2749 (1-800-CDC-INFO) or  - Visit CDCs website at www.cdc.gov/flu    Vaccine Information Statement   Inactivated Influenza Vaccine   8/7/2015  42 [address] [id number]    Department of Health and Human Services  Centers for Disease Control and Prevention    Office Use Only      Follow a no concentrated sweets diet. Stay physically active. Continue your current medications. Follow-up Disposition:  Return in about 3 months (around 12/15/2017) for F/U DM. Advised her to call back or return to office if symptoms worsen/change/persist.  Discussed expected course/resolution/complications of diagnosis in detail with patient. Medication risks/benefits/costs/interactions/alternatives discussed with patient. She was given an after visit summary which includes diagnoses, current medications, & vitals. She expressed understanding with the diagnosis and plan.

## 2017-09-15 NOTE — PROGRESS NOTES
Chief Complaint   Patient presents with    Diabetes     Reviewed record in preparation for visit and have obtained necessary documentation. Identified pt with two pt identifiers(name and ). Health Maintenance Due   Topic    INFLUENZA AGE 9 TO ADULT          Chief Complaint   Patient presents with    Diabetes        Wt Readings from Last 3 Encounters:   09/15/17 165 lb 3.2 oz (74.9 kg)   17 161 lb 9.6 oz (73.3 kg)   17 170 lb 3.2 oz (77.2 kg)     Temp Readings from Last 3 Encounters:   09/15/17 98.3 °F (36.8 °C) (Oral)   17 99.1 °F (37.3 °C) (Oral)   17 97.9 °F (36.6 °C) (Oral)     BP Readings from Last 3 Encounters:   09/15/17 140/76   17 150/84   17 130/88     Pulse Readings from Last 3 Encounters:   09/15/17 97   17 99   17 87           Learning Assessment:  :     Learning Assessment 2017 3/30/2016 2015 4/15/2014 2013   PRIMARY LEARNER Patient Patient Patient Patient Patient   HIGHEST LEVEL OF EDUCATION - PRIMARY LEARNER  SOME COLLEGE SOME COLLEGE SOME COLLEGE SOME COLLEGE -   BARRIERS PRIMARY LEARNER NONE NONE NONE NONE -   CO-LEARNER CAREGIVER No No No No -   PRIMARY LANGUAGE ENGLISH ENGLISH ENGLISH ENGLISH ENGLISH    NEED - No No No -   LEARNER PREFERENCE PRIMARY DEMONSTRATION DEMONSTRATION DEMONSTRATION DEMONSTRATION LISTENING   LEARNING SPECIAL TOPICS - no no no -   ANSWERED BY patient patient patient patient patient   RELATIONSHIP SELF SELF SELF SELF SELF       Depression Screening:  :     PHQ over the last two weeks 3/30/2016   Little interest or pleasure in doing things Not at all   Feeling down, depressed or hopeless Not at all   Total Score PHQ 2 0       Fall Risk Assessment:  :     No flowsheet data found. Abuse Screening:  :     Abuse Screening Questionnaire 2017 3/30/2016 2015 4/15/2014   Do you ever feel afraid of your partner?  N N N N   Are you in a relationship with someone who physically or mentally threatens you? N N N N   Is it safe for you to go home? Y Y Y Y       Coordination of Care Questionnaire:  :     1) Have you been to an emergency room, urgent care clinic since your last visit? no   Hospitalized since your last visit? no             2) Have you seen or consulted any other health care providers outside of [address] [place] [address] since your last visit? no  (Include any pap smears or colon screenings in this section.)    3) Do you have an Advance Directive on file? no    4) Are you interested in receiving information on Advance Directives? NO      Patient is accompanied by self I have received verbal consent from Raúl River to discuss any/all medical information while they are present in the room. Reviewed record  In preparation for visit and have obtained necessary documentation.

## 2017-09-15 NOTE — MR AVS SNAPSHOT
Visit Information Date & Time Provider Department Dept. Phone Encounter #  
 9/15/2017  2:45 PM Virginie Teran MD [place] [id number] [facility] [phone] [id number] Follow-up Instructions Return in about 3 months (around 12/15/2017) for F/U DM. Upcoming Health Maintenance Date Due INFLUENZA AGE 9 TO ADULT 8/1/2017 DTaP/Tdap/Td series (1 - Tdap) 6/13/2018* BREAST CANCER SCRN MAMMOGRAM 6/13/2018* PAP AKA CERVICAL CYTOLOGY 6/13/2018* HEMOGLOBIN A1C Q6M 12/14/2017 LIPID PANEL Q1 2/27/2018 MICROALBUMIN Q1 3/16/2018 FOOT EXAM Q1 6/14/2018 EYE EXAM RETINAL OR DILATED Q1 6/14/2018 COLONOSCOPY 2/16/2026 *Topic was postponed. The date shown is not the original due date. Allergies as of 9/15/2017  Review Complete On: 9/15/2017 By: Virginie Teran MD  
 No Known Allergies Current Immunizations  Reviewed on 3/16/2017 Name Date Influenza Vaccine (Quad) PF  Incomplete ZZZ-RETIRED (DO NOT USE) Pneumococcal Vaccine (Unspecified Type) 6/1/2011 Not reviewed this visit You Were Diagnosed With   
  
 Codes Comments Encounter for immunization    -  Primary ICD-10-CM: I51 ICD-9-CM: V03.89 Vitals BP Pulse Temp Resp Height(growth percentile) Weight(growth percentile) 140/76 (BP 1 Location: Left arm, BP Patient Position: Sitting) 97 98.3 °F (36.8 °C) (Oral) 16 5' 3\" (1.6 m) 165 lb 3.2 oz (74.9 kg) SpO2 BMI OB Status Smoking Status 99% 29.26 kg/m2 Postmenopausal Never Smoker BMI and BSA Data Body Mass Index Body Surface Area  
 29.26 kg/m 2 1.82 m 2 Preferred Pharmacy Pharmacy Name Phone [address] [facility] [phone] Your Updated Medication List  
  
   
This list is accurate as of: 9/15/17  3:04 PM.  Always use your most recent med list.  
  
  
  
  
 Blood-Glucose Meter monitoring kit One Touch Ultra Mini Glucose Meter. Use to check BS daily. exenatide microspheres 2 mg/0.65 mL Pnij Commonly known as:  BYDUREON  
2 mg by SubCUTAneous route every seven (7) days. glucose blood VI test strips strip Commonly known as:  ASCENSIA AUTODISC VI, ONE TOUCH ULTRA TEST VI One Touch Ultra Mini Test Strips. Use to check BS daily. Insulin Needles (Disposable) 31 gauge x 3/16\" Ndle Commonly known as:  BD INSULIN PEN NEEDLE UF MINI Use to inject insulin daily. Lancets Misc Use to check BS daily. lisinopril-hydroCHLOROthiazide 20-25 mg per tablet Commonly known as:  PRINZIDE, ZESTORETIC  
TAKE 1 TABLET DAILY  
  
 metFORMIN 1,000 mg tablet Commonly known as:  GLUCOPHAGE  
TAKE 1 TABLET TWICE A DAY WITH MEALS  
  
 pravastatin 80 mg tablet Commonly known as:  PRAVACHOL  
TAKE 1 TABLET NIGHTLY We Performed the Following INFLUENZA VIRUS VAC QUAD,SPLIT,PRESV FREE SYRINGE IM [id number] CPT(R)] SC IMMUNIZ ADMIN,1 SINGLE/COMB VAC/TOXOID [id number] CPT(R)] Follow-up Instructions Return in about 3 months (around 12/15/2017) for F/U DM. Patient Instructions Vaccine Information Statement Influenza (Flu) Vaccine (Inactivated or Recombinant): What you need to know Many Vaccine Information Statements are available in Togolese and other languages. See www.immunize.org/vis Hojas de Información Sobre Vacunas están disponibles en Español y en muchos otros idiomas. Visite www.immunize.org/vis 1. Why get vaccinated? Influenza (flu) is a contagious disease that spreads around the [place] every year, usually between October and May. Flu is caused by influenza viruses, and is spread mainly by coughing, sneezing, and close contact. Anyone can get flu. Flu strikes suddenly and can last several days. Symptoms vary by age, but can include: 
 fever/chills  sore throat  muscle aches  fatigue  cough  headache  runny or stuffy nose Flu can also lead to pneumonia and blood infections, and cause diarrhea and seizures in children. If you have a medical condition, such as heart or lung disease, flu can make it worse. Flu is more dangerous for some people. Infants and young children, people 72years of age and older, pregnant women, and people with certain health conditions or a weakened immune system are at greatest risk. Each year thousands of people in the [facility] die from flu, and many more are hospitalized. Flu vaccine can: 
 keep you from getting flu, 
 make flu less severe if you do get it, and 
 keep you from spreading flu to your family and other people. 2. Inactivated and recombinant flu vaccines A dose of flu vaccine is recommended every flu season. Children 6 months through 6years of age may need two doses during the same flu season. Everyone else needs only one dose each flu season. Some inactivated flu vaccines contain a very small amount of a mercury-based preservative called thimerosal. Studies have not shown thimerosal in vaccines to be harmful, but flu vaccines that do not contain thimerosal are available. There is no live flu virus in flu shots. They cannot cause the flu. There are many flu viruses, and they are always changing. Each year a new flu vaccine is made to protect against three or four viruses that are likely to cause disease in the upcoming flu season. But even when the vaccine doesnt exactly match these viruses, it may still provide some protection Flu vaccine cannot prevent: 
 flu that is caused by a virus not covered by the vaccine, or 
 illnesses that look like flu but are not. It takes about 2 weeks for protection to develop after vaccination, and protection lasts through the flu season. 3. Some people should not get this vaccine Tell the person who is giving you the vaccine:  If you have any severe, life-threatening allergies. If you ever had a life-threatening allergic reaction after a dose of flu vaccine, or have a severe allergy to any part of this vaccine, you may be advised not to get vaccinated. Most, but not all, types of flu vaccine contain a small amount of egg protein.  If you ever had Guillain-Barré Syndrome (also called GBS). Some people with a history of GBS should not get this vaccine. This should be discussed with your doctor.  If you are not feeling well. It is usually okay to get flu vaccine when you have a mild illness, but you might be asked to come back when you feel better. 4. Risks of a vaccine reaction With any medicine, including vaccines, there is a chance of reactions. These are usually mild and go away on their own, but serious reactions are also possible. Most people who get a flu shot do not have any problems with it. Minor problems following a flu shot include:  
 soreness, redness, or swelling where the shot was given  hoarseness  sore, red or itchy eyes  cough  fever  aches  headache  itching  fatigue If these problems occur, they usually begin soon after the shot and last 1 or 2 days. More serious problems following a flu shot can include the following:  There may be a small increased risk of Guillain-Barré Syndrome (GBS) after inactivated flu vaccine. This risk has been estimated at 1 or 2 additional cases per million people vaccinated. This is much lower than the risk of severe complications from flu, which can be prevented by flu vaccine.  Young children who get the flu shot along with pneumococcal vaccine (PCV13) and/or DTaP vaccine at the same time might be slightly more likely to have a seizure caused by fever. Ask your doctor for more information. Tell your doctor if a child who is getting flu vaccine has ever had a seizure. Problems that could happen after any injected vaccine:  People sometimes faint after a medical procedure, including vaccination. Sitting or lying down for about 15 minutes can help prevent fainting, and injuries caused by a fall. Tell your doctor if you feel dizzy, or have vision changes or ringing in the ears.  Some people get severe pain in the shoulder and have difficulty moving the arm where a shot was given. This happens very rarely.  Any medication can cause a severe allergic reaction. Such reactions from a vaccine are very rare, estimated at about 1 in a million doses, and would happen within a few minutes to a few hours after the vaccination. As with any medicine, there is a very remote chance of a vaccine causing a serious injury or death. The safety of vaccines is always being monitored. For more information, visit: www.cdc.gov/vaccinesafety/ 
 
 
6. The National Vaccine Injury Compensation Program 
 
The [facility] Vaccine Injury Compensation Program (VICP) is a federal program that was created to compensate people who may have been injured by certain vaccines. Persons who believe they may have been injured by a vaccine can learn about the program and about filing a claim by calling 2-272.645.4448 or visiting the [employer] website at www.[facility].gov/vaccinecompensation. There is a time limit to file a claim for compensation. 7. How can I learn more?  Ask your healthcare provider. He or she can give you the vaccine package insert or suggest other sources of information.  Call your local or state health department.  Contact the Centers for Disease Control and Prevention (CDC): 
- Call 8-487.417.1116 (1-800-CDC-INFO) or 
- Visit CDCs website at www.cdc.gov/flu Vaccine Information Statement Inactivated Influenza Vaccine 8/7/2015 
42 JG Forde 771UY-76 [facility] and [employer] Centers for Disease Control and Prevention Office Use Only Follow a no concentrated sweets diet. Stay physically active. Continue your current medications. Introducing \[facility]\"" & HEALTH SERVICES! [facility] introduces [employer] patient portal. Now you can access parts of your medical record, email your doctor's office, and request medication refills online. 1. In your internet browser, go to [email or web address] 2. Click on the First Time User? Click Here link in the Sign In box. You will see the New Member Sign Up page. 3. Enter your ToyTalk Access Code exactly as it appears below. You will not need to use this code after youve completed the sign-up process. If you do not sign up before the expiration date, you must request a new code. · [employer] Access Code: [email or web address] Expires: 12/14/2017  3:04 PM 
 
4. Enter the last four digits of your Social Security Number (xxxx) and Date of Birth (mm/dd/yyyy) as indicated and click Submit. You will be taken to the next sign-up page. 5. Create a Extole ID. This will be your Extole login ID and cannot be changed, so think of one that is secure and easy to remember. 6. Create a Extole password. You can change your password at any time. 7. Enter your Password Reset Question and Answer. This can be used at a later time if you forget your password. 8. Enter your e-mail address. You will receive e-mail notification when new information is available in [address] 9. Click Sign Up. You can now view and download portions of your medical record. 10. Click the Download Summary menu link to download a portable copy of your medical information. If you have questions, please visit the Frequently Asked Questions section of the Extole website. Remember, Extole is NOT to be used for urgent needs. For medical emergencies, dial 911. Now available from your iPhone and Android! Please provide this summary of care documentation to your next provider. Your primary care clinician is listed as Kim Massey. If you have any questions after today's visit, please call [phone].

## 2017-12-15 ENCOUNTER — OFFICE VISIT (OUTPATIENT)
Dept: INTERNAL MEDICINE CLINIC | Age: 62
End: 2017-12-15

## 2017-12-15 VITALS
DIASTOLIC BLOOD PRESSURE: 88 MMHG | RESPIRATION RATE: 16 BRPM | WEIGHT: 166.2 LBS | TEMPERATURE: 99.5 F | BODY MASS INDEX: 29.45 KG/M2 | HEART RATE: 87 BPM | SYSTOLIC BLOOD PRESSURE: 146 MMHG | HEIGHT: 63 IN | OXYGEN SATURATION: 98 %

## 2017-12-15 DIAGNOSIS — I10 BENIGN HYPERTENSION: ICD-10-CM

## 2017-12-15 DIAGNOSIS — E78.5 HYPERLIPIDEMIA LDL GOAL <100: ICD-10-CM

## 2017-12-15 LAB — HBA1C MFR BLD HPLC: 7.9 %

## 2017-12-15 RX ORDER — AMLODIPINE BESYLATE 5 MG/1
5 TABLET ORAL DAILY
Qty: 90 TAB | Refills: 3 | Status: SHIPPED | OUTPATIENT
Start: 2017-12-15 | End: 2019-11-14 | Stop reason: SDUPTHER

## 2017-12-15 NOTE — MR AVS SNAPSHOT
Visit Information Date & Time Provider Department Dept. Phone Encounter #  
 12/15/2017  9:15 AM Peter Ortez MD [place] [address] [facility] [phone] [id number] Follow-up Instructions Return in about 6 weeks (around 1/26/2018) for F/U HTN, F/U DM. Upcoming Health Maintenance Date Due DTaP/Tdap/Td series (1 - Tdap) 6/13/2018* PAP AKA CERVICAL CYTOLOGY 6/13/2018* LIPID PANEL Q1 2/27/2018 MICROALBUMIN Q1 3/16/2018 FOOT EXAM Q1 6/14/2018 EYE EXAM RETINAL OR DILATED Q1 6/14/2018 HEMOGLOBIN A1C Q6M 6/15/2018 COLONOSCOPY 2/16/2026 *Topic was postponed. The date shown is not the original due date. Allergies as of 12/15/2017  Review Complete On: 12/15/2017 By: Peter Ortez MD  
 No Known Allergies Current Immunizations  Reviewed on 9/15/2017 Name Date Influenza Vaccine (Quad) PF 9/15/2017 ZZZ-RETIRED (DO NOT USE) Pneumococcal Vaccine (Unspecified Type) 6/1/2011 Not reviewed this visit You Were Diagnosed With   
  
 Codes Comments Uncontrolled type 2 diabetes mellitus with microalbuminuria, without long-term current use of insulin (HCC)    -  Primary ICD-10-CM: E11.29, E11.65, R80.9 ICD-9-CM: 250.42, 791.0 Benign hypertension     ICD-10-CM: I10 
ICD-9-CM: 401.1 Hyperlipidemia LDL goal <100     ICD-10-CM: E78.5 ICD-9-CM: 272.4 Vitals BP Pulse Temp Resp Height(growth percentile) Weight(growth percentile) 146/88 (BP 1 Location: Left arm, BP Patient Position: Sitting) 87 99.5 °F (37.5 °C) (Oral) 16 5' 3\" (1.6 m) 166 lb 3.2 oz (75.4 kg) SpO2 BMI OB Status Smoking Status 98% 29.44 kg/m2 Postmenopausal Never Smoker Vitals History BMI and BSA Data Body Mass Index Body Surface Area  
 29.44 kg/m 2 1.83 m 2 Preferred Pharmacy Pharmacy Name Phone [address], [facility] [phone] Your Updated Medication List  
  
   
This list is accurate as of: 12/15/17  9:45 AM.  Always use your most recent med list. amLODIPine 5 mg tablet Commonly known as:  Tad Bhavna Take 1 Tab by mouth daily. Indications: hypertension Blood-Glucose Meter monitoring kit One Touch Ultra Mini Glucose Meter. Use to check BS daily. exenatide microspheres 2 mg/0.65 mL Pnij Commonly known as:  BYDUREON  
2 mg by SubCUTAneous route every seven (7) days. Indications: (3) dispense three pens. ninety do supply  
  
 glucose blood VI test strips strip Commonly known as:  ASCENSIA AUTODISC VI, ONE TOUCH ULTRA TEST VI One Touch Ultra Mini Test Strips. Use to check BS daily. Insulin Needles (Disposable) 31 gauge x 3/16\" Ndle Commonly known as:  BD INSULIN PEN NEEDLE UF MINI Use to inject insulin daily. Lancets Misc Use to check BS daily. lisinopril-hydroCHLOROthiazide 20-25 mg per tablet Commonly known as:  PRINZIDE, ZESTORETIC  
TAKE 1 TABLET DAILY  
  
 metFORMIN 1,000 mg tablet Commonly known as:  GLUCOPHAGE  
TAKE 1 TABLET TWICE A DAY WITH MEALS  
  
 pravastatin 80 mg tablet Commonly known as:  PRAVACHOL  
TAKE 1 TABLET NIGHTLY Prescriptions Sent to Pharmacy Refills  
 amLODIPine (NORVASC) 5 mg tablet 3 Sig: Take 1 Tab by mouth daily. Indications: hypertension Class: Normal  
 Pharmacy: [address], [address] Ph #: [phone] Route: Oral  
  
We Performed the Following AMB POC HEMOGLOBIN A1C [08069 CPT(R)] Follow-up Instructions Return in about 6 weeks (around 1/26/2018) for F/U HTN, F/U DM. Patient Instructions Follow a low salt, no concentrated sweets diet. Stay physically active. Continue your current medications and start taking amlodipine 5 mg every evening. Get regular eye exams. Introducing [facility] & HEALTH SERVICES! [employer] introduces [employer] patient portal. Now you can access parts of your medical record, email your doctor's office, and request medication refills online. 1. In your internet browser, go to [email or web address] 2. Click on the First Time User? Click Here link in the Sign In box. You will see the New Member Sign Up page. 3. Enter your [employer]ed Growth Access Code exactly as it appears below. You will not need to use this code after youve completed the sign-up process. If you do not sign up before the expiration date, you must request a new code. · [employer] Access Code: [email or web address] Expires: 3/15/2018  9:45 AM 
 
4. Enter the last four digits of your Social Security Number (xxxx) and Date of Birth (mm/dd/yyyy) as indicated and click Submit. You will be taken to the next sign-up page. 5. Create a Targeted Growth ID. This will be your Targeted Growth login ID and cannot be changed, so think of one that is secure and easy to remember. 6. Create a [employer]ed Growth password. You can change your password at any time. 7. Enter your Password Reset Question and Answer. This can be used at a later time if you forget your password. 8. Enter your e-mail address. You will receive e-mail notification when new information is available in [address] 9. Click Sign Up. You can now view and download portions of your medical record. 10. Click the Download Summary menu link to download a portable copy of your medical information. If you have questions, please visit the Frequently Asked Questions section of the [employer]ed [employer] website. Remember, [employer] is NOT to be used for urgent needs. For medical emergencies, dial 911. Now available from your iPhone and Android! Please provide this summary of care documentation to your next provider. Your primary care clinician is listed as Flores Dickson. If you have any questions after today's visit, please call [phone].

## 2017-12-15 NOTE — PATIENT INSTRUCTIONS
Follow a low salt, no concentrated sweets diet. Stay physically active. Continue your current medications and start taking amlodipine 5 mg every evening. Get regular eye exams.

## 2017-12-15 NOTE — PROGRESS NOTES
Subjective:     Chief Complaint   Patient presents with    Diabetes     She  is a 65y.o. year old female who presents for evaluation. Not checking BS. Compliant with medications. Not following a healthy diet. Not as active as she should be. Historical Data    Past Medical History:   Diagnosis Date    Diabetes ([address]     Hypercholesterolemia     Hypertension         Past Surgical History:   Procedure Laterality Date    HX BREAST BIOPSY Right         Outpatient Encounter Prescriptions as of 12/15/2017   Medication Sig Dispense Refill    exenatide microspheres (BYDUREON) 2 mg/0.65 mL pnij 2 mg by SubCUTAneous route every seven (7) days. Indications: (3) dispense three pens. ninety do supply 2.6 Pen 3    metFORMIN (GLUCOPHAGE) 1,000 mg tablet TAKE 1 TABLET TWICE A DAY WITH MEALS 180 Tab 1    pravastatin (PRAVACHOL) 80 mg tablet TAKE 1 TABLET NIGHTLY 90 Tab 2    lisinopril-hydroCHLOROthiazide (PRINZIDE, ZESTORETIC) 20-25 mg per tablet TAKE 1 TABLET DAILY 30 Tab 0    Insulin Needles, Disposable, (BD INSULIN PEN NEEDLE UF MINI) 31 gauge x 3/16\" ndle Use to inject insulin daily. 100 Pen Needle 3    Blood-Glucose Meter monitoring kit One Touch Ultra Mini Glucose Meter. Use to check BS daily. 1 Kit 0    glucose blood VI test strips (ASCENSIA AUTODISC VI, ONE TOUCH ULTRA TEST VI) strip One Touch Ultra Mini Test Strips. Use to check BS daily. 100 Strip 3    Lancets misc Use to check BS daily. 100 Each 3     No facility-administered encounter medications on file as of 12/15/2017.          No Known Allergies     Social History     Social History    Marital status: SINGLE     Spouse name: N/A    Number of children: N/A    Years of education: N/A     Occupational History     8-4 pm      [employer]      Social History Main Topics    Smoking status: Never Smoker    Smokeless tobacco: Never Used    Alcohol use 0.0 oz/week     0 Standard drinks or equivalent per week    Drug use: No    Sexual activity: Yes     Partners: Male     Other Topics Concern    Not on file     Social History Narrative    , two sons and two grandchildren 26 yo grandson and 10 yo grandson whom she cares for full time. Review of Systems  A comprehensive review of systems was negative except for that written in the HPI. Objective:     Vitals:    12/15/17 0912   BP: 146/88   Pulse: 87   Resp: 16   Temp: 99.5 °F (37.5 °C)   TempSrc: Oral   SpO2: 98%   Weight: 166 lb 3.2 oz (75.4 kg)   Height: 5' 3\" (1.6 m)     Pleasant AAF in no acute distress. Cardiac: RRR without murmurs, gallops or rubs. Lungs: Clear to auscultation. Abdomen: Benign. Results for orders placed or performed in visit on 12/15/17   AMB POC HEMOGLOBIN A1C   Result Value Ref Range    Hemoglobin A1c (POC) 7.9 %         ASSESSMENT / PLAN:   1. Uncontrolled type 2 diabetes mellitus with microalbuminuria, without long-term current use of insulin (HCC)  · Diabetic control is a little worse. · Encouraged increased efforts with diet and exercise. · Continue taking Bydureon and metformin. · Test more frequently. · On ACEI and statin. - AMB POC HEMOGLOBIN A1C    2. Benign hypertension  · Not adequately controlled. · Add amlodipine. 3. Hyperlipidemia LDL goal <100  · On statin    Patient Instructions   Follow a low salt, no concentrated sweets diet. Stay physically active. Continue your current medications and start taking amlodipine 5 mg every evening. Get regular eye exams. Follow-up Disposition:  Return in about 6 weeks (around 1/26/2018) for F/U HTN, F/U DM. Advised her to call back or return to office if symptoms worsen/change/persist.  Discussed expected course/resolution/complications of diagnosis in detail with patient. Medication risks/benefits/costs/interactions/alternatives discussed with patient. She was given an after visit summary which includes diagnoses, current medications, & vitals.   She expressed understanding with the diagnosis and plan.

## 2017-12-15 NOTE — PROGRESS NOTES
Chief Complaint   Patient presents with    Diabetes     Reviewed record in preparation for visit and have obtained necessary documentation. Identified pt with two pt identifiers(name and ). Health Maintenance Due   Topic    HEMOGLOBIN A1C Q6M          Chief Complaint   Patient presents with    Diabetes        Wt Readings from Last 3 Encounters:   12/15/17 166 lb 3.2 oz (75.4 kg)   09/15/17 165 lb 3.2 oz (74.9 kg)   17 161 lb 9.6 oz (73.3 kg)     Temp Readings from Last 3 Encounters:   12/15/17 99.5 °F (37.5 °C) (Oral)   09/15/17 98.3 °F (36.8 °C) (Oral)   17 99.1 °F (37.3 °C) (Oral)     BP Readings from Last 3 Encounters:   12/15/17 146/88   09/15/17 140/76   17 150/84     Pulse Readings from Last 3 Encounters:   12/15/17 87   09/15/17 97   17 99           Learning Assessment:  :     Learning Assessment 2017 3/30/2016 2015 4/15/2014 2013   PRIMARY LEARNER Patient Patient Patient Patient Patient   HIGHEST LEVEL OF EDUCATION - PRIMARY LEARNER  SOME COLLEGE SOME COLLEGE SOME COLLEGE SOME COLLEGE -   BARRIERS PRIMARY LEARNER NONE NONE NONE NONE -   CO-LEARNER CAREGIVER No No No No -   PRIMARY LANGUAGE ENGLISH ENGLISH ENGLISH ENGLISH ENGLISH    NEED - No No No -   LEARNER PREFERENCE PRIMARY DEMONSTRATION DEMONSTRATION DEMONSTRATION DEMONSTRATION LISTENING   LEARNING SPECIAL TOPICS - no no no -   ANSWERED BY patient patient patient patient patient   RELATIONSHIP SELF SELF SELF SELF SELF       Depression Screening:  :     PHQ over the last two weeks 12/15/2017   Little interest or pleasure in doing things Not at all   Feeling down, depressed or hopeless Not at all   Total Score PHQ 2 0       Fall Risk Assessment:  :     No flowsheet data found. Abuse Screening:  :     Abuse Screening Questionnaire 2017 3/30/2016 2015 4/15/2014   Do you ever feel afraid of your partner?  N N N N   Are you in a relationship with someone who physically or mentally threatens you? N N N N   Is it safe for you to go home? Y Y Y Y       Coordination of Care Questionnaire:  :     1) Have you been to an emergency room, urgent care clinic since your last visit? no   Hospitalized since your last visit? no             2) Have you seen or consulted any other health care providers outside of [address] [place] [address] since your last visit? no  (Include any pap smears or colon screenings in this section.)    3) Do you have an Advance Directive on file? no    4) Are you interested in receiving information on Advance Directives? NO      Patient is accompanied by self I have received verbal consent from Sera Hsieh to discuss any/all medical information while they are present in the room. Reviewed record  In preparation for visit and have obtained necessary documentation.

## 2018-01-26 ENCOUNTER — OFFICE VISIT (OUTPATIENT)
Dept: INTERNAL MEDICINE CLINIC | Age: 63
End: 2018-01-26

## 2018-01-26 VITALS
HEIGHT: 63 IN | HEART RATE: 87 BPM | WEIGHT: 170 LBS | SYSTOLIC BLOOD PRESSURE: 128 MMHG | BODY MASS INDEX: 30.12 KG/M2 | TEMPERATURE: 98.2 F | DIASTOLIC BLOOD PRESSURE: 82 MMHG | RESPIRATION RATE: 16 BRPM | OXYGEN SATURATION: 98 %

## 2018-01-26 DIAGNOSIS — E78.5 HYPERLIPIDEMIA LDL GOAL <100: ICD-10-CM

## 2018-01-26 DIAGNOSIS — I10 BENIGN HYPERTENSION: ICD-10-CM

## 2018-01-26 NOTE — PATIENT INSTRUCTIONS
Follow a no concentrated sweets diet. Get regular aerobic exercise. Continue your current medications. See Theresa Mao to learn how to use glucometer. Check your blood sugars several times a week. Get regular eye exams.

## 2018-01-26 NOTE — PROGRESS NOTES
1. Have you been to the ER, urgent care clinic since your last visit? Hospitalized since your last visit? No    2. Have you seen or consulted any other health care providers outside of the [address] [place] [address] since your last visit? Include any pap smears or colon screening.  No  Chief Complaint   Patient presents with    Hypertension    Diabetes

## 2018-01-26 NOTE — MR AVS SNAPSHOT
[facility], [address] [place] [address] 
[phone] Patient: Abena Bowen MRN: [id number] VUJ:[id number] Visit Information Date & Time Provider Department Dept. Phone Encounter #  
 1/26/2018  2:15 PM Amy Sullivan MD [place] [id number] [facility] [phone] Follow-up Instructions Return in about 1 week (around 2/2/2018). Upcoming Health Maintenance Date Due DTaP/Tdap/Td series (1 - Tdap) 6/13/2018* BREAST CANCER SCRN MAMMOGRAM 6/13/2018* PAP AKA CERVICAL CYTOLOGY 6/13/2018* LIPID PANEL Q1 2/27/2018 MICROALBUMIN Q1 3/16/2018 FOOT EXAM Q1 6/14/2018 EYE EXAM RETINAL OR DILATED Q1 6/14/2018 HEMOGLOBIN A1C Q6M 6/15/2018 COLONOSCOPY 2/16/2026 *Topic was postponed. The date shown is not the original due date. Allergies as of 1/26/2018  Review Complete On: 1/26/2018 By: Amy Sullivan MD  
 No Known Allergies Current Immunizations  Reviewed on 9/15/2017 Name Date Influenza Vaccine (Quad) PF 9/15/2017 ZZZ-RETIRED (DO NOT USE) Pneumococcal Vaccine (Unspecified Type) 6/1/2011 Not reviewed this visit You Were Diagnosed With   
  
 Codes Comments Uncontrolled type 2 diabetes mellitus with microalbuminuria, without long-term current use of insulin (HCC)    -  Primary ICD-10-CM: E11.29, E11.65, R80.9 ICD-9-CM: 250.42, 791.0 Benign hypertension     ICD-10-CM: I10 
ICD-9-CM: 401.1 Hyperlipidemia LDL goal <100     ICD-10-CM: E78.5 ICD-9-CM: 272.4 Vitals BP Pulse Temp Resp Height(growth percentile) Weight(growth percentile) 128/82 (BP 1 Location: Left arm, BP Patient Position: Sitting) 87 98.2 °F (36.8 °C) (Oral) 16 5' 3\" (1.6 m) 170 lb (77.1 kg) SpO2 BMI OB Status Smoking Status 98% 30.11 kg/m2 Postmenopausal Never Smoker Vitals History BMI and BSA Data  Body Mass Index Body Surface Area  
 30.11 kg/m 2 1.85 m 2  
 Preferred Pharmacy Pharmacy Name Phone [facility] [phone] Your Updated Medication List  
  
   
This list is accurate as of: 1/26/18  2:36 PM.  Always use your most recent med list. amLODIPine 5 mg tablet Commonly known as:  Anna Day Take 1 Tab by mouth daily. Indications: hypertension Blood-Glucose Meter monitoring kit One Touch Ultra Mini Glucose Meter. Use to check BS daily. exenatide microspheres 2 mg/0.65 mL Pnij Commonly known as:  BYDUREON  
2 mg by SubCUTAneous route every seven (7) days. Indications: (3) dispense three pens. ninety do supply  
  
 glucose blood VI test strips strip Commonly known as:  ASCENSIA AUTODISC VI, ONE TOUCH ULTRA TEST VI One Touch Ultra Mini Test Strips. Use to check BS daily. Insulin Needles (Disposable) 31 gauge x 3/16\" Ndle Commonly known as:  BD INSULIN PEN NEEDLE UF MINI Use to inject insulin daily. Lancets Misc Use to check BS daily. lisinopril-hydroCHLOROthiazide 20-25 mg per tablet Commonly known as:  PRINZIDE, ZESTORETIC  
TAKE 1 TABLET DAILY  
  
 metFORMIN 1,000 mg tablet Commonly known as:  GLUCOPHAGE  
TAKE 1 TABLET TWICE A DAY WITH MEALS  
  
 pravastatin 80 mg tablet Commonly known as:  PRAVACHOL  
TAKE 1 TABLET NIGHTLY Follow-up Instructions Return in about 1 week (around 2/2/2018). Patient Instructions Follow a no concentrated sweets diet. Get regular aerobic exercise. Continue your current medications. See Akila Munoz to learn how to use glucometer. Check your blood sugars several times a week. Get regular eye exams. Introducing [facility] & HEALTH SERVICES! [facility] introduces [employer] patient portal. Now you can access parts of your medical record, email your doctor's office, and request medication refills online.    
 
1. In your internet browser, go to [email or web address] 2. Click on the First Time User? Click Here link in the Sign In box. You will see the New Member Sign Up page. 3. Enter your [employer] Access Code exactly as it appears below. You will not need to use this code after youve completed the sign-up process. If you do not sign up before the expiration date, you must request a new code. · [employer] Access Code: [email or web address] Expires: 3/15/2018  9:45 AM 
 
4. Enter the last four digits of your Social Security Number (xxxx) and Date of Birth (mm/dd/yyyy) as indicated and click Submit. You will be taken to the next sign-up page. 5. Create a Pufettot ID. This will be your [employer] login ID and cannot be changed, so think of one that is secure and easy to remember. 6. Create a [employer] password. You can change your password at any time. 7. Enter your Password Reset Question and Answer. This can be used at a later time if you forget your password. 8. Enter your e-mail address. You will receive e-mail notification when new information is available in [address] 9. Click Sign Up. You can now view and download portions of your medical record. 10. Click the Download Summary menu link to download a portable copy of your medical information. If you have questions, please visit the Frequently Asked Questions section of the [employer] website. Remember, [employer] is NOT to be used for urgent needs. For medical emergencies, dial 911. Now available from your iPhone and Android! Please provide this summary of care documentation to your next provider. Your primary care clinician is listed as Amy Velázquez. If you have any questions after today's visit, please call [phone].

## 2018-01-26 NOTE — PROGRESS NOTES
Subjective:     Chief Complaint   Patient presents with    Hypertension    Diabetes     She  is a 65y.o. year old female who presents for evaluation. Not checking her blood sugars. Tolerating amlodipine. Historical Data    Past Medical History:   Diagnosis Date    Diabetes ([address]     Hypercholesterolemia     Hypertension         Past Surgical History:   Procedure Laterality Date    HX BREAST BIOPSY Right         Outpatient Encounter Prescriptions as of 1/26/2018   Medication Sig Dispense Refill    amLODIPine (NORVASC) 5 mg tablet Take 1 Tab by mouth daily. Indications: hypertension 90 Tab 3    exenatide microspheres (BYDUREON) 2 mg/0.65 mL pnij 2 mg by SubCUTAneous route every seven (7) days. Indications: (3) dispense three pens. ninety do supply 2.6 Pen 3    metFORMIN (GLUCOPHAGE) 1,000 mg tablet TAKE 1 TABLET TWICE A DAY WITH MEALS 180 Tab 1    pravastatin (PRAVACHOL) 80 mg tablet TAKE 1 TABLET NIGHTLY 90 Tab 2    lisinopril-hydroCHLOROthiazide (PRINZIDE, ZESTORETIC) 20-25 mg per tablet TAKE 1 TABLET DAILY 30 Tab 0    Insulin Needles, Disposable, (BD INSULIN PEN NEEDLE UF MINI) 31 gauge x 3/16\" ndle Use to inject insulin daily. 100 Pen Needle 3    Blood-Glucose Meter monitoring kit One Touch Ultra Mini Glucose Meter. Use to check BS daily. 1 Kit 0    glucose blood VI test strips (ASCENSIA AUTODISC VI, ONE TOUCH ULTRA TEST VI) strip One Touch Ultra Mini Test Strips. Use to check BS daily. 100 Strip 3    Lancets misc Use to check BS daily. 100 Each 3     No facility-administered encounter medications on file as of 1/26/2018.          No Known Allergies     Social History     Social History    Marital status: SINGLE     Spouse name: N/A    Number of children: N/A    Years of education: N/A     Occupational History     8-4 pm      [employer]      Social History Main Topics    Smoking status: Never Smoker    Smokeless tobacco: Never Used    Alcohol use 0.0 oz/week     0 Standard drinks or equivalent per week    Drug use: No    Sexual activity: Yes     Partners: Male     Other Topics Concern    Not on file     Social History Narrative    , two sons and two grandchildren 26 yo grandson and 10 yo grandson whom she cares for full time. Review of Systems  Pertinent items are noted in HPI. Objective:     Vitals:    01/26/18 1404   BP: 142/80   Pulse: 87   Resp: 16   Temp: 98.2 °F (36.8 °C)   TempSrc: Oral   SpO2: 98%   Weight: 170 lb (77.1 kg)   Height: 5' 3\" (1.6 m)     Pleasant AAF in no acute distress. Neck: Supple. Cardiac: RRR without murmurs, gallops or rubs. Lungs: Clear to auscultation. Abdomen: Benign. ASSESSMENT / PLAN:   1. Uncontrolled type 2 diabetes mellitus with microalbuminuria, without long-term current use of insulin (HCC)  · Serious conversation about importance of stricter attention to diabetic management. · See GETA to discuss how to use meter. · Continue metformin and Bydureon  · On ACEI and statin. · Get regular eye exams. 2. Benign hypertension  · Low salt / DASH diet. · Continue amlodipine and lisinopril-HCTZ    3. Hyperlipidemia LDL goal <100  · Continue pravastatin. Patient Instructions   Follow a no concentrated sweets diet. Get regular aerobic exercise. Continue your current medications. See Steven aKhn to learn how to use glucometer. Check your blood sugars several times a week. Get regular eye exams. Follow-up Disposition:  Return in about 1 week (around 2/2/2018). Advised her to call back or return to office if symptoms worsen/change/persist.  Discussed expected course/resolution/complications of diagnosis in detail with patient. Medication risks/benefits/costs/interactions/alternatives discussed with patient. She was given an after visit summary which includes diagnoses, current medications, & vitals. She expressed understanding with the diagnosis and plan.

## 2018-01-26 NOTE — LETTER
NOTIFICATION RETURN TO WORK / SCHOOL 
 
1/26/2018 2:36 PM 
 
 
Ms. Austen Nieves [address] [place] [phone] To Whom It May Concern: Austen Nieves is currently under the care of Polina Maloney. She will return to work/school on: 1/29/2018 If there are questions or concerns please have the patient contact our office. Sincerely, Zack Hendricks MD

## 2018-02-26 DIAGNOSIS — E11.65 UNCONTROLLED TYPE 2 DIABETES MELLITUS WITH MICROALBUMINURIA, UNSPECIFIED LONG TERM INSULIN USE STATUS: ICD-10-CM

## 2018-02-26 DIAGNOSIS — E11.29 UNCONTROLLED TYPE 2 DIABETES MELLITUS WITH MICROALBUMINURIA, UNSPECIFIED LONG TERM INSULIN USE STATUS: ICD-10-CM

## 2018-02-26 DIAGNOSIS — R80.9 UNCONTROLLED TYPE 2 DIABETES MELLITUS WITH MICROALBUMINURIA, UNSPECIFIED LONG TERM INSULIN USE STATUS: ICD-10-CM

## 2018-02-26 RX ORDER — METFORMIN HYDROCHLORIDE 1000 MG/1
TABLET ORAL
Qty: 180 TAB | Refills: 1 | Status: SHIPPED | OUTPATIENT
Start: 2018-02-26 | End: 2019-03-18 | Stop reason: SDUPTHER

## 2018-05-27 DIAGNOSIS — E78.5 HYPERLIPIDEMIA LDL GOAL <100: ICD-10-CM

## 2018-05-28 RX ORDER — PRAVASTATIN SODIUM 80 MG/1
TABLET ORAL
Qty: 90 TAB | Refills: 2 | Status: SHIPPED | OUTPATIENT
Start: 2018-05-28 | End: 2018-07-27

## 2018-07-26 ENCOUNTER — OFFICE VISIT (OUTPATIENT)
Dept: INTERNAL MEDICINE CLINIC | Age: 63
End: 2018-07-26

## 2018-07-26 VITALS
HEART RATE: 85 BPM | BODY MASS INDEX: 30.39 KG/M2 | TEMPERATURE: 98.8 F | DIASTOLIC BLOOD PRESSURE: 80 MMHG | SYSTOLIC BLOOD PRESSURE: 130 MMHG | WEIGHT: 171.5 LBS | RESPIRATION RATE: 16 BRPM | HEIGHT: 63 IN | OXYGEN SATURATION: 98 %

## 2018-07-26 DIAGNOSIS — I10 BENIGN HYPERTENSION: ICD-10-CM

## 2018-07-26 DIAGNOSIS — E78.5 HYPERLIPIDEMIA LDL GOAL <100: ICD-10-CM

## 2018-07-26 NOTE — PATIENT INSTRUCTIONS
Follow a low glycemic index diet. Get regular eye exams. See a foot specialist.  Continue your current medications without interruption. Get regular eye exams.

## 2018-07-26 NOTE — MR AVS SNAPSHOT
[facility], [address] [place] [address] 
[phone] Patient: Cara Delaney MRN: [id number]:[id number] Visit Information Date & Time Provider Department Dept. Phone Encounter #  
 7/26/2018  3:30 PM [facility] [phone] Follow-up Instructions Return in about 6 months (around 1/26/2019) for F/U DM. Upcoming Health Maintenance Date Due DTaP/Tdap/Td series (1 - Tdap) 1/27/1977 LIPID PANEL Q1 2/27/2018 MICROALBUMIN Q1 3/16/2018 FOOT EXAM Q1 6/14/2018 EYE EXAM RETINAL OR DILATED Q1 6/14/2018 HEMOGLOBIN A1C Q6M 6/15/2018 Influenza Age 5 to Adult 8/1/2018 BREAST CANCER SCRN MAMMOGRAM 3/14/2020 PAP AKA CERVICAL CYTOLOGY 3/13/2021 COLONOSCOPY 2/16/2026 Allergies as of 7/26/2018  Review Complete On: 7/26/2018 By: Dar Mcwilliams MD  
 No Known Allergies Current Immunizations  Reviewed on 9/15/2017 Name Date Influenza Vaccine (Quad) PF 9/15/2017 ZZZ-RETIRED (DO NOT USE) Pneumococcal Vaccine (Unspecified Type) 6/1/2011 Not reviewed this visit You Were Diagnosed With   
  
 Codes Comments Uncontrolled type 2 diabetes mellitus with microalbuminuria, without long-term current use of insulin (HCC)    -  Primary ICD-10-CM: E11.29, E11.65, R80.9 ICD-9-CM: 250.42, 791.0 Benign hypertension     ICD-10-CM: I10 
ICD-9-CM: 401.1 Hyperlipidemia LDL goal <100     ICD-10-CM: E78.5 ICD-9-CM: 272.4 Vitals BP Pulse Temp Resp Height(growth percentile) Weight(growth percentile) 130/80 (BP 1 Location: Left arm, BP Patient Position: Sitting) 85 98.8 °F (37.1 °C) (Oral) 16 5' 3\" (1.6 m) 171 lb 8 oz (77.8 kg) SpO2 BMI OB Status Smoking Status 98% 30.38 kg/m2 Postmenopausal Never Smoker BMI and BSA Data Body Mass Index Body Surface Area  
 30.38 kg/m 2 1.86 m 2 Preferred Pharmacy Pharmacy Name Phone [facility], [facility] [phone] Your Updated Medication List  
  
   
This list is accurate as of 18  4:03 PM.  Always use your most recent med list. amLODIPine 5 mg tablet Commonly known as:  Breezy Fried Take 1 Tab by mouth daily. Indications: hypertension Blood-Glucose Meter monitoring kit One Touch Ultra Mini Glucose Meter. Use to check BS daily. exenatide microspheres 2 mg/0.65 mL Pnij Commonly known as:  BYDUREON  
2 mg by SubCUTAneous route every seven (7) days. Indications: (3) dispense three pens. ninety do supply  
  
 glucose blood VI test strips strip Commonly known as:  ASCENSIA AUTODISC VI, ONE TOUCH ULTRA TEST VI One Touch Ultra Mini Test Strips. Use to check BS daily. Insulin Needles (Disposable) 31 gauge x 3/16\" Ndle Commonly known as:  BD ULTRA-FINE MINI PEN NEEDLE Use to inject insulin daily. Lancets Misc Use to check BS daily. lisinopril-hydroCHLOROthiazide 20-25 mg per tablet Commonly known as:  PRINZIDE, ZESTORETIC  
TAKE 1 TABLET DAILY  
  
 metFORMIN 1,000 mg tablet Commonly known as:  GLUCOPHAGE  
TAKE 1 TABLET TWICE A DAY WITH MEALS  
  
 pravastatin 80 mg tablet Commonly known as:  PRAVACHOL  
TAKE 1 TABLET NIGHTLY Prescriptions Sent to Pharmacy Refills  
 exenatide microspheres (BYDUREON) 2 mg/0.65 mL pnij 3 Si mg by SubCUTAneous route every seven (7) days. Indications: (3) dispense three pens. ninety do supply Class: Normal  
 Pharmacy: [address] [place] [address], [address] [place] [address] #: [phone] Route: SubCUTAneous We Performed the Following HEMOGLOBIN A1C WITH EAG [68507 CPT(R)]  DIABETES FOOT EXAM [7 Custom] LIPID PANEL [25254 CPT(R)] METABOLIC PANEL, BASIC [93685 CPT(R)] MICROALBUMIN, UR, RAND W/ MICROALB/CREAT RATIO [id number] CPT(R)] TSH REFLEX TO T4 [99488 CPT(R)] Follow-up Instructions Return in about 6 months (around 1/26/2019) for F/U DM. Patient Instructions Follow a low glycemic index diet. Get regular eye exams. See a foot specialist. 
Continue your current medications without interruption. Get regular eye exams. Introducing [facility] HE[facility]! [employer] introduces [employer] patient portal. Now you can access parts of your medical record, email your doctor's office, and request medication refills online. 1. In your internet browser, go to [email or web address] 2. Click on the First Time User? Click Here link in the Sign In box. You will see the New Member Sign Up page. 3. Enter your [employer] Access Code exactly as it appears below. You will not need to use this code after youve completed the sign-up process. If you do not sign up before the expiration date, you must request a new code. · [employer] Access Code: [email or web address] Expires: 10/24/2018  4:03 PM 
 
4. Enter the last four digits of your Social Security Number (xxxx) and Date of Birth (mm/dd/yyyy) as indicated and click Submit. You will be taken to the next sign-up page. 5. Create a Pro[employer] ID. This will be your [employer] login ID and cannot be changed, so think of one that is secure and easy to remember. 6. Create a [employer] password. You can change your password at any time. 7. Enter your Password Reset Question and Answer. This can be used at a later time if you forget your password. 8. Enter your e-mail address. You will receive e-mail notification when new information is available in [address] 9. Click Sign Up. You can now view and download portions of your medical record. 10. Click the Download Summary menu link to download a portable copy of your medical information. If you have questions, please visit the Frequently Asked Questions section of the [employer] website.  Remember, [employer] is NOT to be used for urgent needs. For medical emergencies, dial 911. Now available from your iPhone and Android! Please provide this summary of care documentation to your next provider. Your primary care clinician is listed as Anamaria Dsouza. If you have any questions after today's visit, please call [phone].

## 2018-07-26 NOTE — PROGRESS NOTES
Chief Complaint   Patient presents with    Hypertension    Diabetes     Reviewed record in preparation for visit and have obtained necessary documentation. Identified pt with two pt identifiers(name and ).       Health Maintenance Due   Topic    DTaP/Tdap/Td series (1 - Tdap)    BREAST CANCER SCRN MAMMOGRAM     PAP AKA CERVICAL CYTOLOGY     LIPID PANEL Q1     MICROALBUMIN Q1     FOOT EXAM Q1     EYE EXAM RETINAL OR DILATED Q1     HEMOGLOBIN A1C Q6M          Chief Complaint   Patient presents with    Hypertension    Diabetes        Wt Readings from Last 3 Encounters:   18 171 lb 8 oz (77.8 kg)   18 170 lb (77.1 kg)   12/15/17 166 lb 3.2 oz (75.4 kg)     Temp Readings from Last 3 Encounters:   18 98.8 °F (37.1 °C) (Oral)   18 98.2 °F (36.8 °C) (Oral)   12/15/17 99.5 °F (37.5 °C) (Oral)     BP Readings from Last 3 Encounters:   18 130/80   18 128/82   12/15/17 146/88     Pulse Readings from Last 3 Encounters:   18 85   18 87   12/15/17 87           Learning Assessment:  :     Learning Assessment 2017 3/30/2016 2015 4/15/2014 2013   PRIMARY LEARNER Patient Patient Patient Patient Patient   HIGHEST LEVEL OF EDUCATION - PRIMARY LEARNER  [facility] LEARNER NONE NONE NONE NONE -   CO-LEARNER CAREGIVER No No No No -   PRIMARY LANGUAGE ENGLISH ENGLISH ENGLISH ENGLISH ENGLISH    NEED - No No No -   LEARNER PREFERENCE PRIMARY DEMONSTRATION DEMONSTRATION DEMONSTRATION DEMONSTRATION LISTENING   LEARNING SPECIAL TOPICS - no no no -   ANSWERED BY patient patient patient patient patient   RELATIONSHIP SELF SELF SELF SELF SELF       Depression Screening:  :     PHQ over the last two weeks 2018   Little interest or pleasure in doing things Not at all   Feeling down, depressed, irritable, or hopeless Not at all   Total Score PHQ 2 0       Fall Risk Assessment:  :     No flowsheet data found. Abuse Screening:  :     Abuse Screening Questionnaire 6/14/2017 3/30/2016 6/1/2015 4/15/2014   Do you ever feel afraid of your partner? N N N N   Are you in a relationship with someone who physically or mentally threatens you? N N N N   Is it safe for you to go home? Y Y Y Y       Coordination of Care Questionnaire:  :     1) Have you been to an emergency room, urgent care clinic since your last visit? no   Hospitalized since your last visit? no             2) Have you seen or consulted any other health care providers outside of [address] [place] [address] since your last visit? no  (Include any pap smears or colon screenings in this section.)    3) Do you have an Advance Directive on file? no    4) Are you interested in receiving information on Advance Directives? NO      Patient is accompanied by self I have received verbal consent from Ian Robert to discuss any/all medical information while they are present in the room. Reviewed record  In preparation for visit and have obtained necessary documentation.

## 2018-07-26 NOTE — LETTER
NOTIFICATION RETURN TO WORK / SCHOOL 
 
7/26/2018 4:01 PM 
 
 
Ms. Phong Tyson [address] [phone] To Whom It May Concern: Phong Tyson is currently under the care of Polina Maloney. She will return to work/school on: 7/27/2018 If there are questions or concerns please have the patient contact our office. Sincerely, Peter Ortez MD

## 2018-07-26 NOTE — PROGRESS NOTES
Subjective:     Chief Complaint   Patient presents with    Hypertension    Diabetes     She  is a 59y.o. year old female who presents for evaluation. Taking medications religiously (except that she hasn't had Bydureon for a couple of weeks). Diet compliance is fair. Had mammogram and pap earlier this year. Historical Data    Past Medical History:   Diagnosis Date    Diabetes ([address]     Hypercholesterolemia     Hypertension         Past Surgical History:   Procedure Laterality Date    HX BREAST BIOPSY Right         Outpatient Encounter Prescriptions as of 7/26/2018   Medication Sig Dispense Refill    pravastatin (PRAVACHOL) 80 mg tablet TAKE 1 TABLET NIGHTLY 90 Tab 2    metFORMIN (GLUCOPHAGE) 1,000 mg tablet TAKE 1 TABLET TWICE A DAY WITH MEALS 180 Tab 1    amLODIPine (NORVASC) 5 mg tablet Take 1 Tab by mouth daily. Indications: hypertension 90 Tab 3    lisinopril-hydroCHLOROthiazide (PRINZIDE, ZESTORETIC) 20-25 mg per tablet TAKE 1 TABLET DAILY 30 Tab 0    exenatide microspheres (BYDUREON) 2 mg/0.65 mL pnij 2 mg by SubCUTAneous route every seven (7) days. Indications: (3) dispense three pens. ninety do supply 2.6 Pen 3    Insulin Needles, Disposable, (BD INSULIN PEN NEEDLE UF MINI) 31 gauge x 3/16\" ndle Use to inject insulin daily. 100 Pen Needle 3    Blood-Glucose Meter monitoring kit One Touch Ultra Mini Glucose Meter. Use to check BS daily. 1 Kit 0    glucose blood VI test strips (ASCENSIA AUTODISC VI, ONE TOUCH ULTRA TEST VI) strip One Touch Ultra Mini Test Strips. Use to check BS daily. 100 Strip 3    Lancets misc Use to check BS daily. 100 Each 3     No facility-administered encounter medications on file as of 7/26/2018.          No Known Allergies     Social History     Social History    Marital status: SINGLE     Spouse name: N/A    Number of children: N/A    Years of education: N/A     Occupational History     8-4 pm      [employer]      Social History Main Topics    Smoking status: Never Smoker    Smokeless tobacco: Never Used    Alcohol use 0.0 oz/week     0 Standard drinks or equivalent per week    Drug use: No    Sexual activity: Yes     Partners: Male     Other Topics Concern    Not on file     Social History Narrative    , two sons and two grandchildren 26 yo grandson and 10 yo grandson whom she cares for full time. Review of Systems  A comprehensive review of systems was negative except for that written in the HPI. Objective:     Vitals:    07/26/18 1537   BP: 130/80   Pulse: 85   Resp: 16   Temp: 98.8 °F (37.1 °C)   TempSrc: Oral   SpO2: 98%   Weight: 171 lb 8 oz (77.8 kg)   Height: 5' 3\" (1.6 m)     Pleasant AAF in no acute distress. Neck: Supple. No masses. Cardiac:  RRR without murmurs, gallops or rubs. Lungs: Clear to auscultation. Abdomen: Soft and non-tender. No masses. Extremities: No edema  Neuro: Intact  Diabetic foot exam:     Left Foot:   Visual Exam: normal    Pulse DP: 2+ (normal)   Filament test: normal sensation    Vibratory sensation: normal      Right Foot:   Visual Exam: normal    Pulse DP: 2+ (normal)   Filament test: normal sensation    Vibratory sensation: normal      ASSESSMENT / PLAN:   1. Uncontrolled type 2 diabetes mellitus with microalbuminuria, without long-term current use of insulin (HCC)  · Follow a low glycemic index diet. · Continue taking metformin and Bydureon. · See a foot doctor. · Get regular eye exams. · Is on statin and ACEI. - exenatide microspheres (BYDUREON) 2 mg/0.65 mL pnij; 2 mg by SubCUTAneous route every seven (7) days. Indications: (3) dispense three pens. ninety do supply  Dispense: 2.6 Pen; Refill: 3  - HM DIABETES FOOT EXAM  - MICROALBUMIN, UR, RAND W/ MICROALB/CREAT RATIO  - LIPID PANEL  - METABOLIC PANEL, BASIC  - TSH REFLEX TO T4  - HEMOGLOBIN A1C WITH EAG    2. Benign hypertension  · Well controlled. · Continue lisinopril-HCTZ and amlodipine.     3. Hyperlipidemia LDL goal <100  · Continue statin    Patient Instructions   Follow a low glycemic index diet. Get regular eye exams. See a foot specialist.  Continue your current medications without interruption. Get regular eye exams. Follow-up Disposition:  Return in about 6 months (around 1/26/2019) for F/U DM. Advised her to call back or return to office if symptoms worsen/change/persist.  Discussed expected course/resolution/complications of diagnosis in detail with patient. Medication risks/benefits/costs/interactions/alternatives discussed with patient. She was given an after visit summary which includes diagnoses, current medications, & vitals. She expressed understanding with the diagnosis and plan.

## 2018-07-27 DIAGNOSIS — E78.5 HYPERLIPIDEMIA LDL GOAL <100: Primary | ICD-10-CM

## 2018-07-27 LAB
ALBUMIN/CREAT UR: 10 MG/G CREAT (ref 0–30)
BUN SERPL-MCNC: 16 MG/DL (ref 8–27)
BUN/CREAT SERPL: 19 (ref 12–28)
CALCIUM SERPL-MCNC: 9.8 MG/DL (ref 8.7–10.3)
CHLORIDE SERPL-SCNC: 100 MMOL/L (ref 96–106)
CHOLEST SERPL-MCNC: 242 MG/DL (ref 100–199)
CO2 SERPL-SCNC: 25 MMOL/L (ref 20–29)
CREAT SERPL-MCNC: 0.85 MG/DL (ref 0.57–1)
CREAT UR-MCNC: 103 MG/DL
EST. AVERAGE GLUCOSE BLD GHB EST-MCNC: 197 MG/DL
GLUCOSE SERPL-MCNC: 104 MG/DL (ref 65–99)
HBA1C MFR BLD: 8.5 % (ref 4.8–5.6)
HDLC SERPL-MCNC: 42 MG/DL
INTERPRETATION, 910389: NORMAL
LDLC SERPL CALC-MCNC: 155 MG/DL (ref 0–99)
Lab: NORMAL
MICROALBUMIN UR-MCNC: 10.3 UG/ML
POTASSIUM SERPL-SCNC: 4.8 MMOL/L (ref 3.5–5.2)
SODIUM SERPL-SCNC: 141 MMOL/L (ref 134–144)
TRIGL SERPL-MCNC: 225 MG/DL (ref 0–149)
TSH SERPL DL<=0.005 MIU/L-ACNC: 1.24 UIU/ML (ref 0.45–4.5)
VLDLC SERPL CALC-MCNC: 45 MG/DL (ref 5–40)

## 2018-07-27 RX ORDER — ATORVASTATIN CALCIUM 40 MG/1
40 TABLET, FILM COATED ORAL DAILY
Qty: 90 TAB | Refills: 3 | Status: SHIPPED | OUTPATIENT
Start: 2018-07-27 | End: 2019-07-17 | Stop reason: SDUPTHER

## 2019-03-04 NOTE — PROGRESS NOTES
Subjective: Elías Nickerson is a 62 y.o. female who presents today to establish care    Previously followed with Dr. Calvin Higgins  Works at [employer]. On her feet all day at work, wearing safety shoes    Cares for her 1 grandson, 4 yo. Taking him to counseling and therapy    DM2:  Uncontrolled, last HgbA1c 07/18 8.5%  Today HgbA1c 9.0%  Taking metformin and bydureon   Occasional diarrhea with metformin  Taking statin and acei  Has been having a terrible diet- snacking on junk food  Eats out 2x/week  No home BG checks  No regular exercise, planning on joining [facility]    HTN:  Taking lisinopril-hctz and amlodipine  No leg swelling  No chest pain, palpitations, dyspnea, headaches or dizziness    HLD:  Taking atorvastatin    Health maintenance:   Last pap:  Sees end of March. OBGYN Dr. Catalina Morales. Has had abnormal paps years ago. Getting paps yearly  Last mammogram: Due at the beginning of next month. Goes to [facility]  Last colonoscopy: with polypectomy, Dr. Lita Jin, 02/16, repeat 5 years   Last tetanus vaccination  : Needs  Last influenza vaccination utd  Shingles vaccination: needs shingrix    Patient Active Problem List    Diagnosis Date Noted    H/O colonoscopy with polypectomy 12/30/2013    Pap smear for cervical cancer screening 09/30/2013    Diabetes mellitus type 2, uncontrolled ([facility] [address] 12/28/2011    Hyperlipidemia LDL goal <100 12/28/2011    Benign hypertension 11/07/2011     Current Outpatient Medications   Medication Sig Dispense Refill    atorvastatin (LIPITOR) 40 mg tablet Take 1 Tab by mouth daily. 90 Tab 3    exenatide microspheres (BYDUREON) 2 mg/0.65 mL pnij 2 mg by SubCUTAneous route every seven (7) days. Indications: (3) dispense three pens. ninety do supply 2.6 Pen 3    metFORMIN (GLUCOPHAGE) 1,000 mg tablet TAKE 1 TABLET TWICE A DAY WITH MEALS 180 Tab 1    amLODIPine (NORVASC) 5 mg tablet Take 1 Tab by mouth daily.  Indications: hypertension 90 Tab 3    lisinopril-hydroCHLOROthiazide (PRINZIDE, ZESTORETIC) 20-25 mg per tablet TAKE 1 TABLET DAILY 30 Tab 0    Insulin Needles, Disposable, (BD INSULIN PEN NEEDLE UF MINI) 31 gauge x 3/16\" ndle Use to inject insulin daily. 100 Pen Needle 3    Blood-Glucose Meter monitoring kit One Touch Ultra Mini Glucose Meter. Use to check BS daily. 1 Kit 0    glucose blood VI test strips (ASCENSIA AUTODISC VI, ONE TOUCH ULTRA TEST VI) strip One Touch Ultra Mini Test Strips. Use to check BS daily. 100 Strip 3    Lancets misc Use to check BS daily. 100 Each 3        Review of Systems    Pertinent items are noted in HPI. Objective:     Visit Vitals  /86 (BP 1 Location: Left arm, BP Patient Position: Sitting)   Pulse 85   Temp 97.9 °F (36.6 °C) (Oral)   Resp 18   Ht 5' 3\" (1.6 m)   Wt 172 lb (78 kg)   SpO2 95%   BMI 30.47 kg/m²     General appearance: alert, cooperative, no distress, appears stated age, overweight  Head: Normocephalic, without obvious abnormality, atraumatic  Lungs: clear to auscultation bilaterally  Heart: regular rate and rhythm, S1, S2 normal, no murmur, click, rub or gallop  Extremities: extremities normal, atraumatic, no cyanosis or edema, steady gait  Skin: Skin color, texture, turgor normal  Neurologic: Grossly normal  Psych: calm, cooperative, appropriate affect      Assessment/Plan:     1. Uncontrolled type 2 diabetes mellitus with hyperglycemia ([facility] [address]  - cont bydureon and metformin  - pt refusing additional medication at this time, prefers to work on diet and start regular exercise  - referral to [facility] for diet education  - AMB POC HEMOGLOBIN A1C    2. Benign hypertension  - cont current meds  - as above diet and exercise    3. Hyperlipidemia LDL goal <100  - cont statin  - as above    4. BMI 30.0-30.9,adult  - as above    5.  Encounter for immunization  - reviewed guidelines and recommendations with patient  - varicella-zoster recombinant, PF, (SHINGRIX, PF,) 50 mcg/0.5 mL susr injection; 0.5 mL by IntraMUSCular route once for 1 dose. Dispense: 0.5 mL; Refill: 0  - TETANUS, DIPHTHERIA TOXOIDS AND ACELLULAR PERTUSSIS VACCINE (TDAP), IN INDIVIDS. >=7, IM    Requesting mammograms and paps from [address] [place] [address] her to call back or return to office if symptoms worsen/change/persist.  Discussed expected course/resolution/complications of diagnosis in detail with patient. Medication risks/benefits/costs/interactions/alternatives discussed with patient. She was given an after visit summary which includes diagnoses, current medications, & vitals. She expressed understanding with the diagnosis and plan.     SAMANTHA Babcock

## 2019-03-06 ENCOUNTER — OFFICE VISIT (OUTPATIENT)
Dept: INTERNAL MEDICINE CLINIC | Age: 64
End: 2019-03-06

## 2019-03-06 VITALS
RESPIRATION RATE: 18 BRPM | TEMPERATURE: 97.9 F | HEART RATE: 85 BPM | BODY MASS INDEX: 30.48 KG/M2 | SYSTOLIC BLOOD PRESSURE: 122 MMHG | WEIGHT: 172 LBS | DIASTOLIC BLOOD PRESSURE: 86 MMHG | OXYGEN SATURATION: 95 % | HEIGHT: 63 IN

## 2019-03-06 DIAGNOSIS — I10 BENIGN HYPERTENSION: ICD-10-CM

## 2019-03-06 DIAGNOSIS — Z23 ENCOUNTER FOR IMMUNIZATION: ICD-10-CM

## 2019-03-06 DIAGNOSIS — E11.65 UNCONTROLLED TYPE 2 DIABETES MELLITUS WITH HYPERGLYCEMIA (HCC): Primary | ICD-10-CM

## 2019-03-06 DIAGNOSIS — E78.5 HYPERLIPIDEMIA LDL GOAL <100: ICD-10-CM

## 2019-03-06 LAB — HBA1C MFR BLD HPLC: 9 %

## 2019-03-06 NOTE — PROGRESS NOTES
Reviewed record in preparation for visit and have obtained necessary documentation. Identified pt with two pt identifiers(name and ).       Health Maintenance Due   Topic    DTaP/Tdap/Td series (1 - Tdap)    Shingrix Vaccine Age 49> (1 of 2)    Influenza Age 5 to Adult     HEMOGLOBIN A1C Q6M          Chief Complaint   Patient presents with    Diabetes     6 mth f/u        Wt Readings from Last 3 Encounters:   19 172 lb (78 kg)   18 171 lb 8 oz (77.8 kg)   18 170 lb (77.1 kg)     Temp Readings from Last 3 Encounters:   18 98.8 °F (37.1 °C) (Oral)   18 98.2 °F (36.8 °C) (Oral)   12/15/17 99.5 °F (37.5 °C) (Oral)     BP Readings from Last 3 Encounters:   18 130/80   18 128/82   12/15/17 146/88     Pulse Readings from Last 3 Encounters:   18 85   18 87   12/15/17 87           Learning Assessment:  :     Learning Assessment 3/6/2019 2018 2017 3/30/2016 2015 4/15/2014 2013   PRIMARY LEARNER Patient Patient Patient Patient Patient Patient Patient   HIGHEST LEVEL OF EDUCATION - PRIMARY LEARNER  [address] [place] [address] LEARNER NONE NONE NONE NONE NONE NONE -   CO-LEARNER CAREGIVER - No No No No No -   PRIMARY LANGUAGE ENGLISH ENGLISH ENGLISH ENGLISH ENGLISH ENGLISH ENGLISH    NEED - - - No No No -   LEARNER PREFERENCE PRIMARY VIDEOS DEMONSTRATION DEMONSTRATION DEMONSTRATION DEMONSTRATION DEMONSTRATION LISTENING   LEARNING SPECIAL TOPICS - - - no no no -   ANSWERED BY patient patient patient patient patient patient patient   RELATIONSHIP SELF SELF SELF SELF SELF SELF SELF       Depression Screening:  :     3 most recent PHQ Screens 3/6/2019   Little interest or pleasure in doing things Not at all   Feeling down, depressed, irritable, or hopeless Not at all   Total Score PHQ 2 0       Fall Risk Assessment:  :     Fall Risk Assessment, last 12 mths 3/6/2019 Able to walk? Yes   Fall in past 12 months? No       Abuse Screening:  :     Abuse Screening Questionnaire 3/6/2019 7/26/2018 6/14/2017 3/30/2016 6/1/2015 4/15/2014   Do you ever feel afraid of your partner? N N N N N N   Are you in a relationship with someone who physically or mentally threatens you? N N N N N N   Is it safe for you to go home? Y Y Y Y Y Y       Coordination of Care Questionnaire:  :     1) Have you been to an emergency room, urgent care clinic since your last visit? no   Hospitalized since your last visit? no             2) Have you seen or consulted any other health care providers outside of [address] [place] [address] since your last visit? no  (Include any pap smears or colon screenings in this section.)    3) Do you have an Advance Directive on file? no    4) Are you interested in receiving information on Advance Directives? NO      Patient is accompanied by self I have received verbal consent from Bhupendra Tony to discuss any/all medical information while they are present in the room.

## 2019-03-06 NOTE — LETTER
[address] [facility] 
[address] [place] [address] Héctor Donald [address] [facility] 57 
[phone] Work/School Note Date: 3/6/2019 To Whom It May concern: Opal Barragan was seen and treated today in the office by the following Mya Langston NP. Opal Barragan may return to work on 03/07/2019. Sincerely, Mya Langston NP

## 2019-03-06 NOTE — PATIENT INSTRUCTIONS
Have your Shingrix vaccination administered at your pharmacy             Tdap (Tetanus, Diphtheria, Pertussis) Vaccine: What You Need to Know  Why get vaccinated? Tetanus, diphtheria, and pertussis are very serious diseases. Tdap vaccine can protect us from these diseases. And Tdap vaccine given to pregnant women can protect  babies against pertussis. Tetanus (lockjaw) is rare in the [facility] today. It causes painful muscle tightening and stiffness, usually all over the body. · It can lead to tightening of muscles in the head and neck so you can't open your mouth, swallow, or sometimes even breathe. Tetanus kills about 1 out of 10 people who are infected even after receiving the best medical care. Diphtheria is also rare in the [place] today. It can cause a thick coating to form in the back of the throat. · It can lead to breathing problems, heart failure, paralysis, and death. Pertussis (whooping cough) causes severe coughing spells, which can cause difficulty breathing, vomiting, and disturbed sleep. · It can also lead to weight loss, incontinence, and rib fractures. Up to 2 in 100 adolescents and 5 in 100 adults with pertussis are hospitalized or have complications, which could include pneumonia or death. These diseases are caused by bacteria. Diphtheria and pertussis are spread from person to person through secretions from coughing or sneezing. Tetanus enters the body through cuts, scratches, or wounds. Before vaccines, as many as 200,000 cases of diphtheria, 200,000 cases of pertussis, and hundreds of cases of tetanus were reported in the [place] each year. Since vaccination began, reports of cases for tetanus and diphtheria have dropped by about 99% and for pertussis by about 80%. Tdap vaccine  The Tdap vaccine can protect adolescents and adults from tetanus, diphtheria, and pertussis. One dose of Tdap is routinely given at age 6 or 15.  People who did not get Tdap at that age should get it as soon as possible. Tdap is especially important for health care professionals and anyone having close contact with a baby younger than 12 months. Pregnant women should get a dose of Tdap during every pregnancy, to protect the  from pertussis. Infants are most at risk for severe, life-threatening complications from pertussis. Another vaccine, called Td, protects against tetanus and diphtheria, but not pertussis. A Td booster should be given every 10 years. Tdap may be given as one of these boosters if you have never gotten Tdap before. Tdap may also be given after a severe cut or burn to prevent tetanus infection. Your doctor or the person giving you the vaccine can give you more information. Tdap may safely be given at the same time as other vaccines. Some people should not get this vaccine  · A person who has ever had a life-threatening allergic reaction after a previous dose of any diphtheria-, tetanus-, or pertussis-containing vaccine, OR has a severe allergy to any part of this vaccine, should not get Tdap vaccine. Tell the person giving the vaccine about any severe allergies. · Anyone who had coma or long repeated seizures within 7 days after a childhood dose of DTP or DTaP, or a previous dose of Tdap, should not get Tdap, unless a cause other than the vaccine was found. They can still get Td. · Talk to your doctor if you:  ? Have seizures or another nervous system problem. ? Had severe pain or swelling after any vaccine containing diphtheria, tetanus, or pertussis. ? Ever had a condition called Guillain-Barré Syndrome (GBS). ? Aren't feeling well on the day the shot is scheduled. Risks  With any medicine, including vaccines, there is a chance of side effects. These are usually mild and go away on their own. Serious reactions are also possible but are rare. Most people who get Tdap vaccine do not have any problems with it.   Mild problems following Tdap  (Did not interfere with activities)  · Pain where the shot was given (about 3 in 4 adolescents or 2 in 3 adults)  · Redness or swelling where the shot was given (about 1 person in 5)  · Mild fever of at least 100.4°F (up to about 1 in 25 adolescents or 1 in 100 adults)  · Headache (about 3 or 4 people in 10)  · Tiredness (about 1 person in 3 or 4)  · Nausea, vomiting, diarrhea, stomachache (up to 1 in 4 adolescents or 1 in 10 adults)  · Chills, sore joints (about 1 person in 10)  · Body aches (about 1 person in 3 or 4)  · Rash, swollen glands (uncommon)  Moderate problems following Tdap  (Interfered with activities, but did not require medical attention)  · Pain where the shot was given (up to 1 in 5 or 6)  · Redness or swelling where the shot was given (up to about 1 in 16 adolescents or 1 in 12 adults)  · Fever over 102°F (about 1 in 100 adolescents or 1 in 250 adults)  · Headache (about 1 in 7 adolescents or 1 in 10 adults)  · Nausea, vomiting, diarrhea, stomachache (up to 1 to 3 people in 100)  · Swelling of the entire arm where the shot was given (up to about 1 in 500)  Severe problems following Tdap  (Unable to perform usual activities; required medical attention)  · Swelling, severe pain, bleeding and redness in the arm where the shot was given (rare)  Problems that could happen after any vaccine:  · People sometimes faint after a medical procedure, including vaccination. Sitting or lying down for about 15 minutes can help prevent fainting, and injuries caused by a fall. Tell your doctor if you feel dizzy or have vision changes or ringing in the ears. · Some people get severe pain in the shoulder and have difficulty moving the arm where a shot was given. This happens very rarely. · Any medication can cause a severe allergic reaction. Such reactions from a vaccine are very rare, estimated at fewer than 1 in a million doses, and would happen within a few minutes to a few hours after the vaccination.   As with any medicine, there is a very remote chance of a vaccine causing a serious injury or death. The safety of vaccines is always being monitored. For more information, visit: www.cdc.gov/vaccinesafety. What if there is a serious problem? What should I look for? · Look for anything that concerns you, such as signs of a severe allergic reaction, very high fever, or unusual behavior. Signs of a severe allergic reaction can include hives, swelling of the face and throat, difficulty breathing, a fast heartbeat, dizziness, and weakness. These would usually start a few minutes to a few hours after the vaccination. What should I do? · If you think it is a severe allergic reaction or other emergency that can't wait, call 9-1-1 or get the person to the nearest hospital. Otherwise, call your doctor. · Afterward, the reaction should be reported to the Vaccine Adverse Event Reporting System (VAERS). Your doctor might file this report, or you can do it yourself through the VAERS web site at www.vaers. [facility].gov, or by calling 0-520.315.6085. VAERS does not give medical advice. The National Vaccine Injury Compensation Program  The National Vaccine Injury Compensation Program (VICP) is a federal program that was created to compensate people who may have been injured by certain vaccines. Persons who believe they may have been injured by a vaccine can learn about the program and about filing a claim by calling 5-374.669.7577 or visiting the [employer] website at www.[facility].gov/vaccinecompensation. There is a time limit to file a claim for compensation. How can I learn more? · Ask your doctor. He or she can give you the vaccine package insert or suggest other sources of information. · Call your local or state health department. · Contact the Centers for Disease Control and Prevention (CDC):  ? Call 6-312.361.3741 (1-800-CDC-INFO) or  ? Visit CDC's website at www.cdc.gov/vaccines  Vaccine Information Statement (Interim)  Tdap Vaccine  (2/24/15)  42 U. S.C. § 190MB-76  [facility] and [facility] for Disease Control and Prevention  Many Vaccine Information Statements are available in Thai and other languages. See www.immunize.org/vis. Muchas hojas de información sobre vacunas están disponibles en español y en otros idiomas. Visite www.immunize.org/vis. Care instructions adapted under license by [employer] (which disclaims liability or warranty for this information). If you have questions about a medical condition or this instruction, always ask your healthcare professional. [address] any warranty or liability for your use of this information.

## 2019-03-15 ENCOUNTER — OFFICE VISIT (OUTPATIENT)
Dept: INTERNAL MEDICINE CLINIC | Age: 64
End: 2019-03-15

## 2019-03-15 VITALS
WEIGHT: 170 LBS | RESPIRATION RATE: 16 BRPM | TEMPERATURE: 97.8 F | HEART RATE: 80 BPM | DIASTOLIC BLOOD PRESSURE: 92 MMHG | HEIGHT: 63 IN | SYSTOLIC BLOOD PRESSURE: 168 MMHG | OXYGEN SATURATION: 98 % | BODY MASS INDEX: 30.12 KG/M2

## 2019-03-15 DIAGNOSIS — E11.65 UNCONTROLLED TYPE 2 DIABETES MELLITUS WITH HYPERGLYCEMIA (HCC): Primary | ICD-10-CM

## 2019-03-15 NOTE — PROGRESS NOTES
CC:  Diabetes management/education    S/O: Marga Gómez is a 62 y.o. female referred by Dr. Swapna Conner NP for diabetes management. HPI: Patient just transferred care to NP [facility]. A1c has increased to 9%. Has had diabetes 10+ years. Started out on Metformin. Added Bydureon. Has been on glipizide in the past as well. Feels like she lost weight when she first started Bydureon. Life is busy for her - she works full time and is raising her 5year old grandson. Also lives with her fiance. Current Diabetes Regimen:  Bydureon 2mg weekly  Metformin 1g BID    ROS:   Patient denies hypoglycemic and hyperglycemic signs/symptoms, chest pain, shortness of breath, neuropathy, vision changes, and any foot changes. Self-Monitoring Blood Glucose:  Checks sugars every now and then but really hates pricking her finger    Diet:  Breakfast - eggs, wong, fried potatoes, waffle every now and then; most mornings has her coffee/orange juice sandwich (2 pieces of bread/meat)  Lunch - something quick - sandwich, grapes/other fruit, leftovers- brings her lunch to work  [employer] (1 plate, chicken, baked potato, vegetables, slice of cakes, every now and then gets a roll), red lobster (fish, baked potato, broccoli, cheddar biscuits); cooks at home too (fried chicken, cabbage, greens, pork chops) microwave dinners (meatloaf and mashed potatoes)  Snacks - chips/funyons always in the house for grandson; her hardest thing is fruit    Exercise:   Works at a [address]. so moves all day - [employer]  Does have a treadmill at home and exercise equipment that she's been meaning to get out/dust off so she can start using it again        Data reviewed:  Lab Results   Component Value Date/Time    Hemoglobin A1c 8.5 (H) 07/26/2018 04:08 PM    Hemoglobin A1c (POC) 9.0 03/06/2019 02:36 PM         Diabetes Health Maintenance:  Last eye exam: 6/2017  Last foot exam: 7/2018  Last influenza vaccine: 9/2018  Last Pneumovax 23 vaccine: 6/2011  Last Prevnar-13 vaccine: not indicated  Hepatitis B Series: unknown  ASA Therapy: none  ACE/ARB Therapy: lisinopril/hctz  Statin Therapy: atorvastatin 40mg    Assessment/Plan:     1. Diabetes:  a1c not at goal and has deteriorated some. Patient not checking blood sugars. Sent in order for freestyle ana device to see if it's covered. Patient to call to set up appt to see how to apply if she doesn't feel comfortable. Also sent in prescription for Bydureon BCISE which is an easier device to use than the basic Bydureon. BCISE has had some back-order issues but hopefully should be resolved soon. Educated patient about the plate method, carbs/day, a1c goals and blood sugar goals. Advised patient to watch portions and to cut back on all sweetened beverages. Discussed putting an emphasis on non-starchy vegetables and protein. 2. Hypertension: BP not at goal today <130/80. Monitor at next visit and consider increasing amlodipine if warranted. Patient verbalized understanding of the information presented and all of the patients questions were answered. AVS was handed to the patient and information reviewed. Patient advised to call me or Dr. Alexi Vang NP with any additional questions or concerns. Follow-up: 1 month  Notification of recommendations will be sent to Dr. Alexi Vang NP for review.       Glenn Rodriges, BlancaD, BCPS, CDE

## 2019-03-18 DIAGNOSIS — Z79.4 TYPE 2 DIABETES MELLITUS WITH OTHER SPECIFIED COMPLICATION, WITH LONG-TERM CURRENT USE OF INSULIN (HCC): ICD-10-CM

## 2019-03-18 DIAGNOSIS — E11.69 TYPE 2 DIABETES MELLITUS WITH OTHER SPECIFIED COMPLICATION, WITH LONG-TERM CURRENT USE OF INSULIN (HCC): ICD-10-CM

## 2019-03-18 RX ORDER — METFORMIN HYDROCHLORIDE 1000 MG/1
TABLET ORAL
Qty: 180 TAB | Refills: 1 | Status: SHIPPED | OUTPATIENT
Start: 2019-03-18 | End: 2019-09-16 | Stop reason: SDUPTHER

## 2019-03-18 NOTE — TELEPHONE ENCOUNTER
PCP: Alexi Vang NP    Last appt: 3/15/2019  Future Appointments   Date Time Provider Ruy Elva   4/12/2019  1:30 PM Nicola Meyers   7/5/2019  8:20 AM KAY Fuentes       Requested Prescriptions     Pending Prescriptions Disp Refills    metFORMIN (GLUCOPHAGE) 1,000 mg tablet 180 Tab 1

## 2019-04-12 ENCOUNTER — OFFICE VISIT (OUTPATIENT)
Dept: INTERNAL MEDICINE CLINIC | Age: 64
End: 2019-04-12

## 2019-04-12 VITALS
BODY MASS INDEX: 30.11 KG/M2 | HEART RATE: 84 BPM | SYSTOLIC BLOOD PRESSURE: 126 MMHG | TEMPERATURE: 97.4 F | DIASTOLIC BLOOD PRESSURE: 70 MMHG | RESPIRATION RATE: 16 BRPM | OXYGEN SATURATION: 95 % | HEIGHT: 63 IN

## 2019-04-12 DIAGNOSIS — E11.65 UNCONTROLLED TYPE 2 DIABETES MELLITUS WITH HYPERGLYCEMIA (HCC): Primary | ICD-10-CM

## 2019-04-12 NOTE — LETTER
NOTIFICATION OF RETURN TO WORK / SCHOOL 
 
4/12/2019 2:17 PM 
 
Ms. Ed Grossman [address] [facility] [address] [id number] [facility] To Whom It May Concern: Ed Grossman was under the care of Polina Melendez. She will be able to return to work/school on 4/13/19  with no restrictions. If there are questions or concerns please have the patient contact our office. Sincerely, Manisha Amin

## 2019-04-12 NOTE — PROGRESS NOTES
CC:  Diabetes management/education    S/O: Khari Priest is a 62 y.o. female referred by Dr. Desire Patterson, NP for diabetes management. HPI: Patient is here for follow up. Last visit we sent in the freestyle ana device to see if this was an option. The sensors were free but she had to pay $50 for the reader. She has not been able to get her Bydureon but she is unsure why. [facility] said they had been contacting us but I don't see where they have sent us anything. Current Diabetes Regimen:  Bydureon 2mg weekly -- hasn't taken because ran out and pharmacy said they couldn't get in touch with us about something  Metformin 1g BID    ROS:   Patient denies hypoglycemic and hyperglycemic signs/symptoms, chest pain, shortness of breath, neuropathy, vision changes, and any foot changes. Self-Monitoring Blood Glucose:  Hasn't started using freestyle ana device    Diet:  Cut back a whole lot on her diet. Hasn't \"stopped\" necessarily  Cut back on candy and cake - still has chips    Exercise:  Been cutting the grass so getting a little exercise doing that  Has a treadmill that she plans to start using      Data reviewed:  Lab Results   Component Value Date/Time    Hemoglobin A1c 8.5 (H) 07/26/2018 04:08 PM    Hemoglobin A1c (POC) 9.0 03/06/2019 02:36 PM         Diabetes Health Maintenance:  Last eye exam: just went last week and going back again soon  Last foot exam: 7/2018  Last influenza vaccine: 9/2018  Last Pneumovax 23 vaccine: 6/2011  Last Prevnar-13 vaccine: not indicated  Hepatitis B Series: unknown  ASA Therapy: none  ACE/ARB Therapy: lisinopril/hctz 20/25mg  Statin Therapy: atorvastatin 40mg     Assessment/Plan:     1. Diabetes:  a1c not at goal and patient not checking blood sugars. Advised her to look at the [facility] device and if she's not comfortable in applying it to call and make an appointment to come in next Friday.  Advised her that we need this on ASAP so that we can see where her sugars are. I attempted to [facility] and was on hold for 30 + minutes as their systems are down. I will attempt to call them on Monday to see what the holdup is with the Bydureon. Will call patient once I find out what is going on at  [phone]       Patient verbalized understanding of the information presented and all of the patients questions were answered. AVS was handed to the patient and information reviewed. Patient advised to call me or Dr. Cal Cardoso NP with any additional questions or concerns. Follow-up: 1 month or sooner if she needs help applying device. Notification of recommendations will be sent to Dr. Cal Cardoso NP for review.       Peter Gray, PharmD, BCPS, CDE

## 2019-04-19 NOTE — TELEPHONE ENCOUNTER
Resent prescription for Bydureon BCISE. [facility] states that never received the prescription. He says that [address] has been discontinued. I advised him this was incorrect and that the original Bydureon may not be available but that BCISE is their available product now. BCISE is on backorder until 4/24/19 and they will fill the prescription then.     Jelani Davila, PharmD, BCPS, CDE

## 2019-04-23 NOTE — TELEPHONE ENCOUNTER
Pt requesting another Rx than Rx\"Bydureon 2 mg\" to be called into [facility] mail order pharmacy at [phone] due to Rx is out of stock.

## 2019-04-23 NOTE — TELEPHONE ENCOUNTER
Requested Prescriptions     Pending Prescriptions Disp Refills    exenatide microspheres (BYDUREON BCISE) 2 mg/0.85 mL atIn 12 Syringe 1     Si mg by SubCUTAneous route every seven (7) days. 2019  [facility]    ----- Message from Gerhardt Gillis sent at 2019 12:26 PM EDT -----  Regarding: Np.Paynes/Telephone   Pt requesting another Rx than Rx\"Bydureon 2 mg\" to be called into [email or web address] mail order pharmacy at [phone] due to Rx is out of stock. Best [phone]

## 2019-04-24 NOTE — TELEPHONE ENCOUNTER
Spoke with Park Wilkerson at [facility]. She states that medication is still on back order. She stated the original restock date was 4/4/19. She states [facility] received an updated restock date of 4/24/19 (today). As of yet, they have not received medication and unaware if it will come in later today or at all. Park Wilkerson is asking if we would like to send in an alternative. Please advise.

## 2019-07-05 ENCOUNTER — OFFICE VISIT (OUTPATIENT)
Dept: INTERNAL MEDICINE CLINIC | Age: 64
End: 2019-07-05

## 2019-07-05 VITALS
TEMPERATURE: 97.8 F | HEIGHT: 64 IN | HEART RATE: 88 BPM | BODY MASS INDEX: 28.51 KG/M2 | OXYGEN SATURATION: 97 % | SYSTOLIC BLOOD PRESSURE: 152 MMHG | RESPIRATION RATE: 18 BRPM | DIASTOLIC BLOOD PRESSURE: 86 MMHG | WEIGHT: 167 LBS

## 2019-07-05 DIAGNOSIS — Z23 ENCOUNTER FOR IMMUNIZATION: ICD-10-CM

## 2019-07-05 DIAGNOSIS — B37.2 CANDIDAL SKIN INFECTION: ICD-10-CM

## 2019-07-05 DIAGNOSIS — Z00.00 ANNUAL PHYSICAL EXAM: Primary | ICD-10-CM

## 2019-07-05 DIAGNOSIS — E78.5 HYPERLIPIDEMIA LDL GOAL <100: ICD-10-CM

## 2019-07-05 DIAGNOSIS — I10 BENIGN HYPERTENSION: ICD-10-CM

## 2019-07-05 DIAGNOSIS — E11.65 UNCONTROLLED TYPE 2 DIABETES MELLITUS WITH HYPERGLYCEMIA (HCC): ICD-10-CM

## 2019-07-05 RX ORDER — NYSTATIN 100000 [USP'U]/G
POWDER TOPICAL 4 TIMES DAILY
Qty: 30 G | Refills: 1 | Status: SHIPPED | OUTPATIENT
Start: 2019-07-05 | End: 2020-02-17 | Stop reason: SDUPTHER

## 2019-07-05 RX ORDER — LISINOPRIL AND HYDROCHLOROTHIAZIDE 20; 25 MG/1; MG/1
1 TABLET ORAL DAILY
Qty: 90 TAB | Refills: 3 | Status: SHIPPED | OUTPATIENT
Start: 2019-07-05 | End: 2019-07-05 | Stop reason: SDUPTHER

## 2019-07-05 RX ORDER — LISINOPRIL AND HYDROCHLOROTHIAZIDE 20; 25 MG/1; MG/1
1 TABLET ORAL DAILY
Qty: 7 TAB | Refills: 0 | Status: SHIPPED | OUTPATIENT
Start: 2019-07-05 | End: 2019-11-14 | Stop reason: SDUPTHER

## 2019-07-05 NOTE — PATIENT INSTRUCTIONS
Nystatin powder to skin 3-4x a day    Have your shingrix vaccination administered      1 week worth of your lisinopril-hctz at [facility]

## 2019-07-05 NOTE — PROGRESS NOTES
Subjective: Gigi Graves is a 62 y.o. female who presents today for CPE    Cares for her 1 grandson, 9 yo. Taking him to counseling and therapy, \"he's much better\"    Exercise: works at [employer] - up on feet all day. No other exercise. Was going to join [employer]  Diet: working on diabetic diet and weight loss with Jaleesa Meyers      DM2:  Last hgba1c in 03/2019 was 9.0%. Refused medication adjustment, wanted to work on diet and start exercise  Diet has been bad for years, working on it. Has been watching her portions  Does love ice cream and chocolate  Taking metformin [id number] bid and trulicity, not using bydureon  Also taking lisinopril and atorvastatin  Now has the freestyle ana, \"loves it\"  BG 110s, up to 150  Health fair last week  fasting  No peripheral neuropathy    HTN:  takes lisinopril-hctz and amlodipine  Has been out of lisinopril-hctz for 2 weeks, BP elevated today     HLD:  Taking atorvastatin    Denies any chest pain, palpitations, shortness of breath, cough, abdominal pain, bowel changes, blood in stool, difficulty urinating, headaches, or dizziness    Today reporting some \"spots\" underneath panus for the past few weeks.   Concerned for skin cancer or ring work  Doesn't itch       Health maintenance:   Last pap:  scheduled 7/19- OBGYN Dr. Sunitha Leger  Last mammogram: will get a referral order from Dr. Jared Singh later this month, [facility]  Last colonoscopy: with polypectomy, Dr. Giovanny Layne, 02/16, repeat 5 years , due in 2021  Last tetanus vaccination: UTD  Needs Shingrix, forgot about it    Patient Active Problem List    Diagnosis Date Noted    H/O colonoscopy with polypectomy 12/30/2013    Pap smear for cervical cancer screening 09/30/2013    Diabetes mellitus type 2, uncontrolled ([address] 12/28/2011    Hyperlipidemia LDL goal <100 12/28/2011    Benign hypertension 11/07/2011     Current Outpatient Medications   Medication Sig Dispense Refill    exenatide microspheres (BYDUREON BCISE) 2 mg/0.85 mL atIn 2 mg by SubCUTAneous route every seven (7) days. 12 Syringe 1    dulaglutide (TRULICITY) 1.5 PP/3.9 mL sub-q pen 0.5 mL by SubCUTAneous route every seven (7) days. 12 Pen 1    metFORMIN (GLUCOPHAGE) 1,000 mg tablet TAKE 1 TABLET TWICE A DAY WITH MEALS 180 Tab 1    flash glucose scanning reader (FREESTYLE PAYTON 14 DAY READER) misc Use to check sugars throughout the day 1 Each 0    flash glucose sensor (FREESTYLE PAYTON 14 DAY SENSOR) kit Apply 1 sensor every 14 days 2 Kit 3    atorvastatin (LIPITOR) 40 mg tablet Take 1 Tab by mouth daily. 90 Tab 3    amLODIPine (NORVASC) 5 mg tablet Take 1 Tab by mouth daily. Indications: hypertension 90 Tab 3    lisinopril-hydroCHLOROthiazide (PRINZIDE, ZESTORETIC) 20-25 mg per tablet TAKE 1 TABLET DAILY 30 Tab 0    Insulin Needles, Disposable, (BD INSULIN PEN NEEDLE UF MINI) 31 gauge x 3/16\" ndle Use to inject insulin daily. 100 Pen Needle 3    Blood-Glucose Meter monitoring kit One Touch Ultra Mini Glucose Meter. Use to check BS daily. 1 Kit 0    glucose blood VI test strips (ASCENSIA AUTODISC VI, ONE TOUCH ULTRA TEST VI) strip One Touch Ultra Mini Test Strips. Use to check BS daily. 100 Strip 3    Lancets misc Use to check BS daily. 100 Each 3        Review of Systems    A comprehensive review of systems was negative except for that written in the HPI. Objective:     Visit Vitals  /86 (BP 1 Location: Left arm, BP Patient Position: Sitting)   Pulse 88   Temp 97.8 °F (36.6 °C) (Oral)   Resp 18   Ht 5' 4\" (1.626 m)   Wt 167 lb (75.8 kg)   SpO2 97%   BMI 28.67 kg/m²     General:  Alert, cooperative, no distress, appears stated age. Head:  Normocephalic, without obvious abnormality, atraumatic. Eyes:  Conjunctivae/corneas clear. PERRL, EOMs intact. .   Ears:  Normal TMs and external ear canals both ears. Nose: Nares normal. Septum midline.  Mucosa normal.    Throat: Lips, mucosa, and tongue normal. Teeth and gums normal. Neck: Supple, symmetrical, trachea midline, no adenopathy, thyroid: no enlargement/tenderness/nodules, no carotid bruit and no JVD. Back:   Symmetric, no curvature. ROM normal.    Lungs:   Clear to auscultation bilaterally. Chest wall:  No tenderness or deformity. Heart:  Regular rate and rhythm, S1, S2 normal, no murmur, click, rub or gallop. Breast Exam:  Deferred    Abdomen:   Soft, non-tender. Bowel sounds normal. No masses,  No organomegaly. Extremities: Extremities normal, atraumatic, no cyanosis or edema. Pulses: 2+ and symmetric all extremities. Skin: Skin color, texture, turgor normal. Mild lacy pink rash underneath panus   Lymph nodes: Cervical, supraclavicular, and axillary nodes normal.   Neurologic: CNII-XII intact. Normal strength, sensation throughout. Assessment/Plan:     1. Annual physical exam  - pt declined mammogram order- will get through obgyn in 2 weeks  - LIPID PANEL  - HEMOGLOBIN A1C WITH EAG  - METABOLIC PANEL, COMPREHENSIVE  - CBC WITH AUTOMATED DIFF  - TSH 3RD GENERATION  - MICROALBUMIN, UR, RAND W/ MICROALB/CREAT RATIO  - AMB POC EKG ROUTINE W/ 12 LEADS, INTER & REP    2. Uncontrolled type 2 diabetes mellitus with hyperglycemia ([facility] [address]  - reviewed importance of diet, regular exercise  - cont current meds  - HEMOGLOBIN A1C WITH EAG  - METABOLIC PANEL, COMPREHENSIVE  - CBC WITH AUTOMATED DIFF  - MICROALBUMIN, UR, RAND W/ MICROALB/CREAT RATIO  - flash glucose sensor (FREESTYLE PAYTON 14 DAY SENSOR) kit; Apply 1 sensor every 14 days  Dispense: 2 Kit; Refill: 11    3. Benign hypertension  - above goal, no meds x 2 weeks. Restart. Do not run out of meds  - METABOLIC PANEL, COMPREHENSIVE  - CBC WITH AUTOMATED DIFF  - TSH 3RD GENERATION  - lisinopril-hydroCHLOROthiazide (PRINZIDE, ZESTORETIC) 20-25 mg per tablet; Take 1 Tab by mouth daily. Dispense: 7 Tab; Refill: 0    4.  Hyperlipidemia LDL goal <100  - cont statin  - as above diet and exercise  - LIPID PANEL  - METABOLIC PANEL, COMPREHENSIVE    5. Encounter for immunization  - varicella-zoster recombinant, PF, (SHINGRIX, PF,) 50 mcg/0.5 mL susr injection; 0.5 mL by IntraMUSCular route once for 1 dose. Dispense: 0.5 mL; Refill: 0    6. Candidal skin infection  - nystatin (MYCOSTATIN) powder; Apply  to affected area four (4) times daily. Dispense: 30 g; Refill: 1    Advised her to call back or return to office if symptoms worsen/change/persist.  Discussed expected course/resolution/complications of diagnosis in detail with patient. Medication risks/benefits/costs/interactions/alternatives discussed with patient. She was given an after visit summary which includes diagnoses, current medications, & vitals. She expressed understanding with the diagnosis and plan.     SAMANTHA Ortega

## 2019-07-06 LAB
ALBUMIN SERPL-MCNC: 4.7 G/DL (ref 3.6–4.8)
ALBUMIN/CREAT UR: 20.1 MG/G CREAT (ref 0–30)
ALBUMIN/GLOB SERPL: 1.4 {RATIO} (ref 1.2–2.2)
ALP SERPL-CCNC: 49 IU/L (ref 39–117)
ALT SERPL-CCNC: 10 IU/L (ref 0–32)
AST SERPL-CCNC: 18 IU/L (ref 0–40)
BASOPHILS # BLD AUTO: 0 X10E3/UL (ref 0–0.2)
BASOPHILS NFR BLD AUTO: 1 %
BILIRUB SERPL-MCNC: 0.3 MG/DL (ref 0–1.2)
BUN SERPL-MCNC: 14 MG/DL (ref 8–27)
BUN/CREAT SERPL: 17 (ref 12–28)
CALCIUM SERPL-MCNC: 9.9 MG/DL (ref 8.7–10.3)
CHLORIDE SERPL-SCNC: 102 MMOL/L (ref 96–106)
CHOLEST SERPL-MCNC: 213 MG/DL (ref 100–199)
CO2 SERPL-SCNC: 22 MMOL/L (ref 20–29)
CREAT SERPL-MCNC: 0.82 MG/DL (ref 0.57–1)
CREAT UR-MCNC: 127.4 MG/DL
EOSINOPHIL # BLD AUTO: 0.2 X10E3/UL (ref 0–0.4)
EOSINOPHIL NFR BLD AUTO: 3 %
ERYTHROCYTE [DISTWIDTH] IN BLOOD BY AUTOMATED COUNT: 11.6 % (ref 12.3–15.4)
EST. AVERAGE GLUCOSE BLD GHB EST-MCNC: 189 MG/DL
GLOBULIN SER CALC-MCNC: 3.4 G/DL (ref 1.5–4.5)
GLUCOSE SERPL-MCNC: 137 MG/DL (ref 65–99)
HBA1C MFR BLD: 8.2 % (ref 4.8–5.6)
HCT VFR BLD AUTO: 38.1 % (ref 34–46.6)
HDLC SERPL-MCNC: 41 MG/DL
HGB BLD-MCNC: 12.4 G/DL (ref 11.1–15.9)
IMM GRANULOCYTES # BLD AUTO: 0 X10E3/UL (ref 0–0.1)
IMM GRANULOCYTES NFR BLD AUTO: 0 %
INTERPRETATION, 910389: NORMAL
LDLC SERPL CALC-MCNC: 133 MG/DL (ref 0–99)
LYMPHOCYTES # BLD AUTO: 2.2 X10E3/UL (ref 0.7–3.1)
LYMPHOCYTES NFR BLD AUTO: 35 %
Lab: NORMAL
MCH RBC QN AUTO: 30 PG (ref 26.6–33)
MCHC RBC AUTO-ENTMCNC: 32.5 G/DL (ref 31.5–35.7)
MCV RBC AUTO: 92 FL (ref 79–97)
MICROALBUMIN UR-MCNC: 25.6 UG/ML
MONOCYTES # BLD AUTO: 0.5 X10E3/UL (ref 0.1–0.9)
MONOCYTES NFR BLD AUTO: 7 %
NEUTROPHILS # BLD AUTO: 3.3 X10E3/UL (ref 1.4–7)
NEUTROPHILS NFR BLD AUTO: 54 %
PLATELET # BLD AUTO: 360 X10E3/UL (ref 150–450)
POTASSIUM SERPL-SCNC: 5 MMOL/L (ref 3.5–5.2)
PROT SERPL-MCNC: 8.1 G/DL (ref 6–8.5)
RBC # BLD AUTO: 4.13 X10E6/UL (ref 3.77–5.28)
SODIUM SERPL-SCNC: 140 MMOL/L (ref 134–144)
TRIGL SERPL-MCNC: 193 MG/DL (ref 0–149)
TSH SERPL DL<=0.005 MIU/L-ACNC: 1.84 UIU/ML (ref 0.45–4.5)
VLDLC SERPL CALC-MCNC: 39 MG/DL (ref 5–40)
WBC # BLD AUTO: 6.2 X10E3/UL (ref 3.4–10.8)

## 2019-07-15 ENCOUNTER — TELEPHONE (OUTPATIENT)
Dept: INTERNAL MEDICINE CLINIC | Age: 64
End: 2019-07-15

## 2019-07-15 DIAGNOSIS — E78.5 HYPERLIPIDEMIA LDL GOAL <100: ICD-10-CM

## 2019-07-15 NOTE — TELEPHONE ENCOUNTER
Unsuccessful attempt to contact pt on the requested number. LVM requesting she contact the office back.

## 2019-07-15 NOTE — TELEPHONE ENCOUNTER
----- Message from Viky Sánchez NP sent at 7/14/2019  6:44 PM EDT -----  Could you please call her and let her know that her labs show me that her diabetes is still not well controlled, as well as her cholesterol is not controlled either. She has refused medication adjustments in the past, I would like to adjust both her cholesterol medication as well as add in an additional oral diabetes medication if she is open to it. I would start glipizide for the diabetes, and increase her atorvastatin dosage. Could you check with her to see if she is now open to medication adjustments? If so, I will send the scripts- let me know please    Thanks!     Viky Sánchez NP    ----- Message -----  From: Artur Dsouza Lab Results In  Sent: 7/6/2019   2:35 PM  To: Viky Sánchez NP

## 2019-07-15 NOTE — TELEPHONE ENCOUNTER
Call to pt to discuss provider's message & recommendations. LVM on home/mobile number requesting her to contact the office back at her convenience.

## 2019-07-16 NOTE — TELEPHONE ENCOUNTER
Received incoming call from pt - verified self and birth date. Pt was informed of NP's medication recommendations d/t recent lab levels and agreed to start a new medication for diabetes and adjusting the atorvastatin dosage. Rx's can be sent to [facility] on file, per pt.

## 2019-07-17 RX ORDER — ATORVASTATIN CALCIUM 80 MG/1
80 TABLET, FILM COATED ORAL DAILY
Qty: 90 TAB | Refills: 1 | Status: SHIPPED | OUTPATIENT
Start: 2019-07-17 | End: 2020-06-19

## 2019-07-17 RX ORDER — GLIPIZIDE 5 MG/1
5 TABLET ORAL 2 TIMES DAILY
Qty: 30 TAB | Refills: 2 | Status: SHIPPED | OUTPATIENT
Start: 2019-07-17 | End: 2019-11-14 | Stop reason: SDUPTHER

## 2019-09-16 DIAGNOSIS — Z79.4 TYPE 2 DIABETES MELLITUS WITH OTHER SPECIFIED COMPLICATION, WITH LONG-TERM CURRENT USE OF INSULIN (HCC): ICD-10-CM

## 2019-09-16 DIAGNOSIS — E11.69 TYPE 2 DIABETES MELLITUS WITH OTHER SPECIFIED COMPLICATION, WITH LONG-TERM CURRENT USE OF INSULIN (HCC): ICD-10-CM

## 2019-09-16 RX ORDER — METFORMIN HYDROCHLORIDE 1000 MG/1
TABLET ORAL
Qty: 180 TAB | Refills: 1 | Status: SHIPPED | OUTPATIENT
Start: 2019-09-16 | End: 2021-04-13 | Stop reason: SDUPTHER

## 2019-11-14 ENCOUNTER — OFFICE VISIT (OUTPATIENT)
Dept: PRIMARY CARE CLINIC | Age: 64
End: 2019-11-14

## 2019-11-14 VITALS
HEART RATE: 87 BPM | TEMPERATURE: 98.8 F | RESPIRATION RATE: 17 BRPM | BODY MASS INDEX: 29.33 KG/M2 | SYSTOLIC BLOOD PRESSURE: 145 MMHG | HEIGHT: 64 IN | WEIGHT: 171.8 LBS | OXYGEN SATURATION: 98 % | DIASTOLIC BLOOD PRESSURE: 86 MMHG

## 2019-11-14 DIAGNOSIS — E78.5 HYPERLIPIDEMIA LDL GOAL <100: ICD-10-CM

## 2019-11-14 DIAGNOSIS — I10 BENIGN HYPERTENSION: ICD-10-CM

## 2019-11-14 DIAGNOSIS — E11.65 UNCONTROLLED TYPE 2 DIABETES MELLITUS WITH HYPERGLYCEMIA (HCC): Primary | ICD-10-CM

## 2019-11-14 DIAGNOSIS — R05.9 COUGH: ICD-10-CM

## 2019-11-14 LAB — HBA1C MFR BLD HPLC: 8.4 %

## 2019-11-14 RX ORDER — BENZONATATE 200 MG/1
200 CAPSULE ORAL
Qty: 21 CAP | Refills: 0 | Status: SHIPPED | OUTPATIENT
Start: 2019-11-14 | End: 2019-11-21

## 2019-11-14 RX ORDER — LISINOPRIL AND HYDROCHLOROTHIAZIDE 20; 25 MG/1; MG/1
1 TABLET ORAL DAILY
Qty: 90 TAB | Refills: 2 | Status: SHIPPED | OUTPATIENT
Start: 2019-11-14 | End: 2020-09-02

## 2019-11-14 RX ORDER — AMLODIPINE BESYLATE 5 MG/1
5 TABLET ORAL DAILY
Qty: 90 TAB | Refills: 3 | Status: SHIPPED | OUTPATIENT
Start: 2019-11-14 | End: 2020-10-22

## 2019-11-14 RX ORDER — GLIPIZIDE 5 MG/1
5 TABLET ORAL 2 TIMES DAILY
Qty: 180 TAB | Refills: 1 | Status: SHIPPED | OUTPATIENT
Start: 2019-11-14 | End: 2020-06-15 | Stop reason: SDUPTHER

## 2019-11-14 NOTE — PROGRESS NOTES
Subjective:     Chief Complaint   Patient presents with    [facility]    Cough     productive cough x 1 month    Ear Fullness     left ear x over 1 month        She  is a 62y.o. year old female who presents as a new patient to \[facility]\"". Her medical hx is significant for HTN, DM-2, HLD. A month ago she lost her voice but about a week but her voice came back last week. She did not have any sore throat or cough. Reports that for the past couple of days she has been having  mild intermittent dry cough with no associated fever, chills, soa, wheezing. States that cough is  not that bad. C/O left ear full ness for a month. Denies any pain, drainage. DM-2: She is  on Trulicity, metformin and Glipizide but she is was out of Glipizide for while . HTN: She is suppose to be on prinzide and amlodipine. Reports that her last PCP provided her only 7 prinzide in July so she did not take that med since end of July . She has been taking Amlodipine 5 mg regularly. HLD: compliant with Lipitor 80 mg. Lab Results   Component Value Date/Time    Cholesterol, total 213 (H) 07/05/2019 09:12 AM    HDL Cholesterol 41 07/05/2019 09:12 AM    LDL,Direct 149 (H) 06/04/2010 04:14 PM    LDL, calculated 133 (H) 07/05/2019 09:12 AM    VLDL, calculated 39 07/05/2019 09:12 AM    Triglyceride 193 (H) 07/05/2019 09:12 AM         Pertinent items are noted in HPI. karen   Objective:     Vitals:    11/14/19 1354   Resp: 17   Weight: 171 lb 12.8 oz (77.9 kg)   Height: 5' 4\" (1.626 m)       Physical Examination: General appearance - alert, well appearing, and in no distress, oriented to person, place, and time and overweight  Mental status - alert, oriented to person, place, and time, normal mood, behavior, speech, dress, motor activity, and thought processes  Eyes - pupils equal and reactive, extraocular eye movements intact  Ears - right ear normal, ceruminosis noted in left ear otherwise normal. Normal hearing.    Nose - normal and patent, no erythema, discharge or polyps and normal nontender sinuses  Mouth - mucous membranes moist, pharynx normal without lesions  Neck - supple, no significant adenopathy, thyroid exam: thyroid is normal in size without nodules or tenderness  Chest - clear to auscultation, no wheezes, rales or rhonchi, symmetric air entry  Heart - normal rate, regular rhythm, normal S1, S2, no murmurs, rubs, clicks or gallops  Neurological - alert, oriented, normal speech, no focal findings or movement disorder noted  Extremities - no pedal edema noted    No Known Allergies   Social History     Socioeconomic History    Marital status: SINGLE     Spouse name: Not on file    Number of children: Not on file    Years of education: Not on file    Highest education level: Not on file   Occupational History    Occupation:  8-4 pm     Comment: [employer]    Tobacco Use    Smoking status: Never Smoker    Smokeless tobacco: Never Used   Substance and Sexual Activity    Alcohol use: Not Currently     Alcohol/week: 0.0 standard drinks     Frequency: Monthly or less    Drug use: Never    Sexual activity: Yes     Partners: Male   Social History Narrative    , two sons and two grandchildren 24 yo grandson and 10 yo grandson whom she cares for full time. Family History   Problem Relation Age of Onset    Heart Disease Mother         CHF    Cancer Father         lung      Past Surgical History:   Procedure Laterality Date    HX BREAST BIOPSY Right       Past Medical History:   Diagnosis Date    Diabetes ([facility] [address]     Hypercholesterolemia     Hypertension       Current Outpatient Medications   Medication Sig Dispense Refill    dulaglutide (TRULICITY) 1.5 VA/5.4 mL sub-q pen 0.5 mL by SubCUTAneous route every seven (7) days.  12 Pen 1    metFORMIN (GLUCOPHAGE) 1,000 mg tablet TAKE 1 TABLET TWICE A DAY WITH MEALS 180 Tab 1    glipiZIDE (GLUCOTROL) 5 mg tablet Take 1 Tab by mouth two (2) times a day. 30 Tab 2    atorvastatin (LIPITOR) 80 mg tablet Take 1 Tab by mouth daily. 90 Tab 1    nystatin (MYCOSTATIN) powder Apply  to affected area four (4) times daily. 30 g 1    amLODIPine (NORVASC) 5 mg tablet Take 1 Tab by mouth daily. Indications: hypertension 90 Tab 3    flash glucose sensor (FREESTYLE PAYTON 14 DAY SENSOR) kit Apply 1 sensor every 14 days 2 Kit 11    lisinopril-hydroCHLOROthiazide (PRINZIDE, ZESTORETIC) 20-25 mg per tablet Take 1 Tab by mouth daily. 7 Tab 0    flash glucose scanning reader (FREESTYLE PAYTON 14 DAY READER) misc Use to check sugars throughout the day 1 Each 0    Insulin Needles, Disposable, (BD INSULIN PEN NEEDLE UF MINI) 31 gauge x 3/16\" ndle Use to inject insulin daily. 100 Pen Needle 3    Blood-Glucose Meter monitoring kit One Touch Ultra Mini Glucose Meter. Use to check BS daily. 1 Kit 0    glucose blood VI test strips (ASCENSIA AUTODISC VI, ONE TOUCH ULTRA TEST VI) strip One Touch Ultra Mini Test Strips. Use to check BS daily. 100 Strip 3    Lancets misc Use to check BS daily. 100 Each 3        Assessment/ Plan:   Diagnoses and all orders for this visit:    1. Uncontrolled type 2 diabetes mellitus with hyperglycemia (HCC)  -     AMB POC HEMOGLOBIN A1C is 8.4  -    Continue Trulicity, Metformin and restart  glipiZIDE (GLUCOTROL) 5 mg tablet; Take 1 Tab by mouth two (2) times a day. 2. Benign hypertension  -     Restart lisinopril-hydroCHLOROthiazide (PRINZIDE, ZESTORETIC) 20-25 mg per tablet; Take 1 Tab by mouth daily. -     Continue amLODIPine (NORVASC) 5 mg tablet; Take 1 Tab by mouth daily. Indications: high blood pressure    3. Hyperlipidemia LDL goal <100      - continue Lipitor   4. Cough  -    Exam is normal.    benzonatate (TESSALON) 200 mg capsule; Take 1 Cap by mouth three (3) times daily as needed for Cough for up to 7 days. Follow up if symptoms worsen.          Medication risks/benefits/costs/interactions/alternatives discussed with patient. Advised patient to call back or return to office if symptoms worsen/change/persist. If patient cannot reach us or should anything more severe/urgent arise he/she should proceed directly to the nearest emergency department. Discussed expected course/resolution/complications of diagnosis in detail with patient. Patient given a written after visit summary which includes her diagnoses, current medications and vitals. Patient expressed understanding with the diagnosis and plan.

## 2019-11-14 NOTE — LETTER
NOTIFICATION OF RETURN TO WORK / SCHOOL 
 
11/14/2019 2:22 PM 
 
Ms. Shani Lau [address] [phone] Harry Espinoza To Whom It May Concern: Shani Lau was under the care of Marianna Yarbrough She will be able to return to work/school on 11/15/2019  with no restrictions. If there are questions or concerns please have the patient contact our office.  
 
Sincerely, 
 
 
Ana M Oden MD

## 2019-12-26 ENCOUNTER — OFFICE VISIT (OUTPATIENT)
Dept: PRIMARY CARE CLINIC | Age: 64
End: 2019-12-26

## 2019-12-26 VITALS
SYSTOLIC BLOOD PRESSURE: 138 MMHG | BODY MASS INDEX: 29.06 KG/M2 | DIASTOLIC BLOOD PRESSURE: 79 MMHG | HEIGHT: 64 IN | WEIGHT: 170.2 LBS | OXYGEN SATURATION: 98 % | HEART RATE: 87 BPM | TEMPERATURE: 97.9 F | RESPIRATION RATE: 17 BRPM

## 2019-12-26 DIAGNOSIS — H93.11 RINGING IN EAR, RIGHT: Primary | ICD-10-CM

## 2019-12-26 DIAGNOSIS — E78.5 HYPERLIPIDEMIA LDL GOAL <100: ICD-10-CM

## 2019-12-26 DIAGNOSIS — I10 BENIGN HYPERTENSION: ICD-10-CM

## 2019-12-26 NOTE — PROGRESS NOTES
Subjective:     Chief Complaint   Patient presents with    Blood Pressure Check     6wk f/u    Ringing in Ear     was told by previous physician that she is experiencing hearing loss in right ear. Pt states that ringing in her ear is getting louder        She  is a 62y.o. year old female with medical hx is significant for HTN, DM-2, HLD is here for follow up on blood pressure as well with some concerns.     C/O right ear ringing. Reports that this an ongoing issues for her. Getting louder recently mostly at night. Denies any pain, drainage.       DM-2: She is  on Trulicity, metformin and Glipizide . Reports that sometimes she does not take the morning Glipizide. HTN: She has restarted taking the prinzide from last visit and amlodipine continued. . Compliant.      HLD: compliant with Lipitor 80 mg. Denies any chest pain, soa, cough. Pertinent items are noted in HPI.   Objective:     Vitals:    12/26/19 0801 12/26/19 0828   BP: 146/87 138/79   Pulse: 87    Resp: 17    Temp: 97.9 °F (36.6 °C)    TempSrc: Oral    SpO2: 98%    Weight: 170 lb 3.2 oz (77.2 kg)    Height: 5' 4\" (1.626 m)        Physical Examination: General appearance - alert, well appearing, and in no distress, oriented to person, place, and time and overweight  Mental status - alert, oriented to person, place, and time, normal mood, behavior, speech, dress, motor activity, and thought processes  Chest - clear to auscultation, no wheezes, rales or rhonchi, symmetric air entry  Heart - normal rate, regular rhythm, normal S1, S2, no murmurs, rubs, clicks or gallops    No Known Allergies   Social History     Socioeconomic History    Marital status: SINGLE     Spouse name: Not on file    Number of children: Not on file    Years of education: Not on file    Highest education level: Not on file   Occupational History    Occupation:  8-4 pm     Comment: [employer]    Tobacco Use    Smoking status: Never Smoker    Smokeless tobacco: Never Used   Substance and Sexual Activity    Alcohol use: Not Currently     Alcohol/week: 0.0 standard drinks     Frequency: Monthly or less    Drug use: Never    Sexual activity: Yes     Partners: Male   Social History Narrative    , two sons and two grandchildren 24 yo grandson and 10 yo grandson whom she cares for full time. Family History   Problem Relation Age of Onset    Heart Disease Mother         CHF    Cancer Father         lung      Past Surgical History:   Procedure Laterality Date    HX BREAST BIOPSY Right       Past Medical History:   Diagnosis Date    Diabetes [address] [address]     Hypercholesterolemia     Hypertension       Current Outpatient Medications   Medication Sig Dispense Refill    lisinopril-hydroCHLOROthiazide (PRINZIDE, ZESTORETIC) 20-25 mg per tablet Take 1 Tab by mouth daily. 90 Tab 2    amLODIPine (NORVASC) 5 mg tablet Take 1 Tab by mouth daily. Indications: high blood pressure 90 Tab 3    glipiZIDE (GLUCOTROL) 5 mg tablet Take 1 Tab by mouth two (2) times a day. 180 Tab 1    dulaglutide (TRULICITY) 1.5 KA/7.8 mL sub-q pen 0.5 mL by SubCUTAneous route every seven (7) days. 12 Pen 1    metFORMIN (GLUCOPHAGE) 1,000 mg tablet TAKE 1 TABLET TWICE A DAY WITH MEALS 180 Tab 1    atorvastatin (LIPITOR) 80 mg tablet Take 1 Tab by mouth daily. 90 Tab 1    nystatin (MYCOSTATIN) powder Apply  to affected area four (4) times daily. 30 g 1    flash glucose sensor (FREESTYLE PAYTON 14 DAY SENSOR) kit Apply 1 sensor every 14 days 2 Kit 11    flash glucose scanning reader (FREESTYLE PAYTON 14 DAY READER) misc Use to check sugars throughout the day 1 Each 0    Insulin Needles, Disposable, (BD INSULIN PEN NEEDLE UF MINI) 31 gauge x 3/16\" ndle Use to inject insulin daily. 100 Pen Needle 3    Blood-Glucose Meter monitoring kit One Touch Ultra Mini Glucose Meter. Use to check BS daily.  1 Kit 0    glucose blood VI test strips (ASCENSIA AUTODISC VI, ONE TOUCH ULTRA TEST VI) strip One Touch Ultra Mini Test Strips. Use to check BS daily. 100 Strip 3    Lancets misc Use to check BS daily. 100 Each 3        Assessment/ Plan:   Diagnoses and all orders for this visit:    1. Ringing in ear, right  -     REFERRAL TO ENT-OTOLARYNGOLOGY    2. Benign hypertension       - BP well controlled with current med. 3. Hyperlipidemia LDL goal <100         -  Continue Lipitor. Encouraged to follow healthy lifestyle. Medication risks/benefits/costs/interactions/alternatives discussed with patient. Advised patient to call back or return to office if symptoms worsen/change/persist. If patient cannot reach us or should anything more severe/urgent arise he/she should proceed directly to the nearest emergency department. Discussed expected course/resolution/complications of diagnosis in detail with patient. Patient given a written after visit summary which includes her diagnoses, current medications and vitals. Patient expressed understanding with the diagnosis and plan. Follow-up and Dispositions    · Return in about 8 weeks (around 2/17/2020) for Bring diabetic log book., Bring BP log book. Edd Hebert

## 2020-02-17 ENCOUNTER — OFFICE VISIT (OUTPATIENT)
Dept: PRIMARY CARE CLINIC | Age: 65
End: 2020-02-17

## 2020-02-17 VITALS
DIASTOLIC BLOOD PRESSURE: 75 MMHG | BODY MASS INDEX: 28.31 KG/M2 | HEIGHT: 64 IN | TEMPERATURE: 98.3 F | WEIGHT: 165.8 LBS | OXYGEN SATURATION: 98 % | HEART RATE: 90 BPM | SYSTOLIC BLOOD PRESSURE: 118 MMHG | RESPIRATION RATE: 17 BRPM

## 2020-02-17 DIAGNOSIS — E11.65 UNCONTROLLED TYPE 2 DIABETES MELLITUS WITH HYPERGLYCEMIA (HCC): Primary | ICD-10-CM

## 2020-02-17 DIAGNOSIS — B37.2 CANDIDAL SKIN INFECTION: ICD-10-CM

## 2020-02-17 DIAGNOSIS — E78.5 HYPERLIPIDEMIA LDL GOAL <70: ICD-10-CM

## 2020-02-17 DIAGNOSIS — I10 BENIGN HYPERTENSION: ICD-10-CM

## 2020-02-17 LAB — HBA1C MFR BLD HPLC: 8 %

## 2020-02-17 RX ORDER — NYSTATIN 100000 [USP'U]/G
POWDER TOPICAL 4 TIMES DAILY
Qty: 30 G | Refills: 3 | Status: SHIPPED | OUTPATIENT
Start: 2020-02-17 | End: 2020-09-18 | Stop reason: SDUPTHER

## 2020-02-17 NOTE — LETTER
NOTIFICATION OF RETURN TO WORK / SCHOOL 
 
2/17/2020 1:59 PM 
 
Ms. Emigdio Espinal [address] [place] [phone] Brenda Buckley To Whom It May Concern: Emigdio Espinal was under the care of Marianna Yarbrough She will be able to return to work/school on 2/18/2020 with no restrictions. If there are questions or concerns please have the patient contact our office.  
 
Sincerely, 
 
 
Coleen Castro MD

## 2020-02-17 NOTE — PROGRESS NOTES
Sera Hsieh is a 60 y.o. female    Chief Complaint   Patient presents with    Diabetes     a1c check    Medication Refill     nystatin powder       1. Have you been to the ER, urgent care clinic since your last visit? Hospitalized since your last visit? No    2. Have you seen or consulted any other health care providers outside of the [address] [place] [address] since your last visit? Include any pap smears or colon screening. No    No flowsheet data found.      Health Maintenance Due   Topic Date Due    Eye Exam Retinal or Dilated  06/14/2019    Foot Exam Q1  07/26/2019    Breast Cancer Screen Mammogram  03/14/2020

## 2020-02-17 NOTE — PROGRESS NOTES
Subjective:     Chief Complaint   Patient presents with    Diabetes     a1c check    Medication Refill     nystatin powder    Hypertension    Cholesterol Problem        She  is a 60y.o. year old female medical hx is significant for HTN, DM-2, HLD is here for follow up.         DM-2: She is  on Trulicity, metformin and Glipizide. Last A1c was 8.4. Reports that she has been taking Glipizide and metformin mostly once/day most of the days. Taking Trulicity may be every 8-9 days instead of every 7 days. HTN: BP has been well controled with prinzide and amlodipine 5 mg.      HLD: Compliant with Lipitor 80 mg. No side effects. Lab Results   Component Value Date/Time    LDL,Direct 149 (H) 06/04/2010 04:14 PM    LDL, calculated 133 (H) 07/05/2019 09:12 AM     Candidal yeast infection in her lower abdomen. Need refill of Nystatin powder. Working well. She denies any chest pain, blurry vision,  soa, leg pain. Pertinent items are noted in HPI.   Objective:     Vitals:    02/17/20 1329   Resp: 17   Weight: 165 lb 12.8 oz (75.2 kg)   Height: 5' 4\" (1.626 m)       Physical Examination: General appearance - alert, well appearing, and in no distress, oriented to person, place, and time and overweight  Mental status - alert, oriented to person, place, and time, normal mood, behavior, speech, dress, motor activity, and thought processes  Chest - clear to auscultation, no wheezes, rales or rhonchi, symmetric air entry  Heart - normal rate, regular rhythm, normal S1, S2, no murmurs, rubs, clicks or gallops  Extremities - peripheral pulses normal, no pedal edema, no clubbing or cyanosis, feet normal, good pulses, normal color, temperature and sensation, monofilament sensory exam is normal in both feet    No Known Allergies   Social History     Socioeconomic History    Marital status: SINGLE     Spouse name: Not on file    Number of children: Not on file    Years of education: Not on file    Highest education level: Not on file   Occupational History    Occupation:  8-4 pm     Comment: [employer]    Tobacco Use    Smoking status: Never Smoker    Smokeless tobacco: Never Used   Substance and Sexual Activity    Alcohol use: Not Currently     Alcohol/week: 0.0 standard drinks     Frequency: Monthly or less    Drug use: Never    Sexual activity: Yes     Partners: Male   Social History Narrative    , two sons and two grandchildren 24 yo grandson and 10 yo grandson whom she cares for full time. Family History   Problem Relation Age of Onset    Heart Disease Mother         CHF    Cancer Father         lung      Past Surgical History:   Procedure Laterality Date    HX BREAST BIOPSY Right       Past Medical History:   Diagnosis Date    Diabetes ([facility] [address]     Hypercholesterolemia     Hypertension       Current Outpatient Medications   Medication Sig Dispense Refill    lisinopril-hydroCHLOROthiazide (PRINZIDE, ZESTORETIC) 20-25 mg per tablet Take 1 Tab by mouth daily. 90 Tab 2    amLODIPine (NORVASC) 5 mg tablet Take 1 Tab by mouth daily. Indications: high blood pressure 90 Tab 3    glipiZIDE (GLUCOTROL) 5 mg tablet Take 1 Tab by mouth two (2) times a day. 180 Tab 1    dulaglutide (TRULICITY) 1.5 NB/1.5 mL sub-q pen 0.5 mL by SubCUTAneous route every seven (7) days. 12 Pen 1    metFORMIN (GLUCOPHAGE) 1,000 mg tablet TAKE 1 TABLET TWICE A DAY WITH MEALS 180 Tab 1    atorvastatin (LIPITOR) 80 mg tablet Take 1 Tab by mouth daily. 90 Tab 1    flash glucose sensor (FREESTYLE PAYTON 14 DAY SENSOR) kit Apply 1 sensor every 14 days 2 Kit 11    nystatin (MYCOSTATIN) powder Apply  to affected area four (4) times daily. 30 g 1    flash glucose scanning reader (FREESTYLE PAYTON 14 DAY READER) misc Use to check sugars throughout the day 1 Each 0    Insulin Needles, Disposable, (BD INSULIN PEN NEEDLE UF MINI) 31 gauge x 3/16\" ndle Use to inject insulin daily.  100 Pen Needle 3    Blood-Glucose Meter monitoring kit One Touch Ultra Mini Glucose Meter. Use to check BS daily. 1 Kit 0    glucose blood VI test strips (ASCENSIA AUTODISC VI, ONE TOUCH ULTRA TEST VI) strip One Touch Ultra Mini Test Strips. Use to check BS daily. 100 Strip 3    Lancets misc Use to check BS daily. 100 Each 3        Assessment/ Plan:   Diagnoses and all orders for this visit:    1. Uncontrolled type 2 diabetes mellitus with hyperglycemia (HCC)  -     AMB POC HEMOGLOBIN A1C is 8.0, slightly improved from 8.4. Patient is not taking her med regularly. Strongly advised to be compliant with med. Continue current med. -     METABOLIC PANEL, COMPREHENSIVE  -     flash glucose sensor (FREESTYLE PAYTON 14 DAY SENSOR) kit; Apply 1 sensor every 14 days  -      DIABETES FOOT EXAM              remained to have yearly regular eye exam done. 2. Candidal skin infection  -     nystatin (MYCOSTATIN) powder; Apply  to affected area four (4) times daily. 3. Hyperlipidemia LDL goal <70  -     LIPID PANEL  -     METABOLIC PANEL, COMPREHENSIVE        -    Continue Lipitor and continue work on diet and exercise. 4. Benign hypertension  -   BP well controled with current med. METABOLIC PANEL, COMPREHENSIVE           Medication risks/benefits/costs/interactions/alternatives discussed with patient. Advised patient to call back or return to office if symptoms worsen/change/persist. If patient cannot reach us or should anything more severe/urgent arise he/she should proceed directly to the nearest emergency department. Discussed expected course/resolution/complications of diagnosis in detail with patient. Patient given a written after visit summary which includes her diagnoses, current medications and vitals. Patient expressed understanding with the diagnosis and plan. Follow-up and Dispositions    · Return in about 3 months (around 5/17/2020), or if symptoms worsen or fail to improve, for Bring diabetic log book. Ana Rosa Andrews

## 2020-02-18 LAB
ALBUMIN SERPL-MCNC: 4.7 G/DL (ref 3.8–4.8)
ALBUMIN/GLOB SERPL: 1.3 {RATIO} (ref 1.2–2.2)
ALP SERPL-CCNC: 44 IU/L (ref 39–117)
ALT SERPL-CCNC: 12 IU/L (ref 0–32)
AST SERPL-CCNC: 22 IU/L (ref 0–40)
BILIRUB SERPL-MCNC: 0.4 MG/DL (ref 0–1.2)
BUN SERPL-MCNC: 17 MG/DL (ref 8–27)
BUN/CREAT SERPL: 19 (ref 12–28)
CALCIUM SERPL-MCNC: 10 MG/DL (ref 8.7–10.3)
CHLORIDE SERPL-SCNC: 99 MMOL/L (ref 96–106)
CHOLEST SERPL-MCNC: 204 MG/DL (ref 100–199)
CO2 SERPL-SCNC: 23 MMOL/L (ref 20–29)
CREAT SERPL-MCNC: 0.9 MG/DL (ref 0.57–1)
GLOBULIN SER CALC-MCNC: 3.5 G/DL (ref 1.5–4.5)
GLUCOSE SERPL-MCNC: 74 MG/DL (ref 65–99)
HDLC SERPL-MCNC: 41 MG/DL
LDLC SERPL CALC-MCNC: 126 MG/DL (ref 0–99)
POTASSIUM SERPL-SCNC: 4.7 MMOL/L (ref 3.5–5.2)
PROT SERPL-MCNC: 8.2 G/DL (ref 6–8.5)
SODIUM SERPL-SCNC: 139 MMOL/L (ref 134–144)
TRIGL SERPL-MCNC: 187 MG/DL (ref 0–149)
VLDLC SERPL CALC-MCNC: 37 MG/DL (ref 5–40)

## 2020-02-19 NOTE — PROGRESS NOTES
Please call patient:    1. Cholesterol level is still high and not at goal. Please take the Lipitor daily and continue work on diet and exercise. . If not better in three months then we have to consider adding another cholesterol med.      2. Kidney and liver function is normal.

## 2020-06-15 ENCOUNTER — OFFICE VISIT (OUTPATIENT)
Dept: PRIMARY CARE CLINIC | Age: 65
End: 2020-06-15

## 2020-06-15 ENCOUNTER — VIRTUAL VISIT (OUTPATIENT)
Dept: PRIMARY CARE CLINIC | Age: 65
End: 2020-06-15

## 2020-06-15 VITALS
HEIGHT: 64 IN | BODY MASS INDEX: 26.8 KG/M2 | SYSTOLIC BLOOD PRESSURE: 137 MMHG | TEMPERATURE: 97 F | RESPIRATION RATE: 16 BRPM | WEIGHT: 157 LBS | OXYGEN SATURATION: 100 % | HEART RATE: 85 BPM | DIASTOLIC BLOOD PRESSURE: 79 MMHG

## 2020-06-15 DIAGNOSIS — E78.5 HYPERLIPIDEMIA LDL GOAL <70: ICD-10-CM

## 2020-06-15 DIAGNOSIS — E11.65 UNCONTROLLED TYPE 2 DIABETES MELLITUS WITH HYPERGLYCEMIA (HCC): ICD-10-CM

## 2020-06-15 DIAGNOSIS — K21.9 GASTROESOPHAGEAL REFLUX DISEASE, ESOPHAGITIS PRESENCE NOT SPECIFIED: Primary | ICD-10-CM

## 2020-06-15 DIAGNOSIS — I10 BENIGN HYPERTENSION: ICD-10-CM

## 2020-06-15 LAB — HBA1C MFR BLD HPLC: 7.6 %

## 2020-06-15 RX ORDER — GLIPIZIDE 5 MG/1
5 TABLET ORAL 2 TIMES DAILY
Qty: 180 TAB | Refills: 1 | Status: SHIPPED | OUTPATIENT
Start: 2020-06-15 | End: 2020-06-15 | Stop reason: SDUPTHER

## 2020-06-15 RX ORDER — PHENOL/SODIUM PHENOLATE
20 AEROSOL, SPRAY (ML) MUCOUS MEMBRANE DAILY
Qty: 90 TAB | Refills: 0 | Status: SHIPPED | OUTPATIENT
Start: 2020-06-15 | End: 2020-08-26

## 2020-06-15 RX ORDER — GLIPIZIDE 5 MG/1
5 TABLET ORAL 2 TIMES DAILY
Qty: 180 TAB | Refills: 1 | Status: SHIPPED | OUTPATIENT
Start: 2020-06-15 | End: 2020-12-22 | Stop reason: SDUPTHER

## 2020-06-15 NOTE — PROGRESS NOTES
Subjective:     Chief Complaint   Patient presents with    Diabetes     3 MOS F/U    Medication Refill     glipizide        She  is a 60y.o. year old female with medical hx is significant for HTN, DM-2, HLD is here for follow up as well as acute concern. Patient reports that she has Hx of GERD. For the past 4 months she notice her symptoms worse. She notice throat discomfort with feeling something is there in her mid chest, feels uncomfortable. . Denies any cough, chest tightness, soa. She is not taking any med for the GERD symptoms.        DM-2: She is  on Trulicity, metformin and Glipizide. Last A1c was 8.0. Reports that she has been taking Metformin and Trulicity but stopped taking Glipizide for the past two months. States that she did not have any refill on it.     HTN: BP has been well controled with prinzide and amlodipine 5 mg.      HLD: Compliant with Lipitor 80 mg. No side effects. Lab Results   Component Value Date/Time    Cholesterol, total 204 (H) 02/17/2020 02:08 PM    HDL Cholesterol 41 02/17/2020 02:08 PM    LDL,Direct 149 (H) 06/04/2010 04:14 PM    LDL, calculated 126 (H) 02/17/2020 02:08 PM    VLDL, calculated 37 02/17/2020 02:08 PM    Triglyceride 187 (H) 02/17/2020 02:08 PM     Denies any chest pain, soa, cough, abdominal pain. Pertinent items are noted in HPI.   Objective:     Vitals:    06/15/20 0852   BP: 137/79   Pulse: 85   Resp: 16   Temp: 97 °F (36.1 °C)   TempSrc: Temporal   SpO2: 100%   Weight: 157 lb (71.2 kg)   Height: 5' 4\" (1.626 m)       Physical Examination: General appearance - alert, well appearing, and in no distress, oriented to person, place, and time and overweight  Mental status - alert, oriented to person, place, and time, normal mood, behavior, speech, dress, motor activity, and thought processes  Chest - clear to auscultation, no wheezes, rales or rhonchi, symmetric air entry  Heart - normal rate, regular rhythm, normal S1, S2, no murmurs, rubs, clicks or gallops  Neurological - alert, oriented, normal speech, no focal findings or movement disorder noted  Extremities - no pedal edema noted    No Known Allergies   Social History     Socioeconomic History    Marital status: SINGLE     Spouse name: Not on file    Number of children: Not on file    Years of education: Not on file    Highest education level: Not on file   Occupational History    Occupation:  8-4 pm     Comment: [employer]    Tobacco Use    Smoking status: Never Smoker    Smokeless tobacco: Never Used   Substance and Sexual Activity    Alcohol use: Not Currently     Alcohol/week: 0.0 standard drinks     Frequency: Monthly or less    Drug use: Never    Sexual activity: Yes     Partners: Male   Social History Narrative    , two sons and two grandchildren 26 yo grandson and 10 yo grandson whom she cares for full time. Family History   Problem Relation Age of Onset    Heart Disease Mother         CHF    Cancer Father         lung      Past Surgical History:   Procedure Laterality Date    HX BREAST BIOPSY Right       Past Medical History:   Diagnosis Date    Diabetes ([facility] [address]     Hypercholesterolemia     Hypertension       Current Outpatient Medications   Medication Sig Dispense Refill    nystatin (MYCOSTATIN) powder Apply  to affected area four (4) times daily. 30 g 3    flash glucose sensor ([employer] SoftwareSTYLE PAYTON 14 DAY SENSOR) kit Apply 1 sensor every 14 days 2 Kit 11    lisinopril-hydroCHLOROthiazide (PRINZIDE, ZESTORETIC) 20-25 mg per tablet Take 1 Tab by mouth daily. 90 Tab 2    amLODIPine (NORVASC) 5 mg tablet Take 1 Tab by mouth daily. Indications: high blood pressure 90 Tab 3    glipiZIDE (GLUCOTROL) 5 mg tablet Take 1 Tab by mouth two (2) times a day. 180 Tab 1    dulaglutide (TRULICITY) 1.5 CB/3.3 mL sub-q pen 0.5 mL by SubCUTAneous route every seven (7) days.  12 Pen 1    metFORMIN (GLUCOPHAGE) 1,000 mg tablet TAKE 1 TABLET TWICE A DAY WITH MEALS 180 Tab 1    atorvastatin (LIPITOR) 80 mg tablet Take 1 Tab by mouth daily. 90 Tab 1    flash glucose scanning reader (FREESTYLE PAYTON 14 DAY READER) misc Use to check sugars throughout the day 1 Each 0    Insulin Needles, Disposable, (BD INSULIN PEN NEEDLE UF MINI) 31 gauge x 3/16\" ndle Use to inject insulin daily. 100 Pen Needle 3    Blood-Glucose Meter monitoring kit One Touch Ultra Mini Glucose Meter. Use to check BS daily. 1 Kit 0    glucose blood VI test strips (ASCENSIA AUTODISC VI, ONE TOUCH ULTRA TEST VI) strip One Touch Ultra Mini Test Strips. Use to check BS daily. 100 Strip 3    Lancets misc Use to check BS daily. 100 Each 3        Assessment/ Plan:   Diagnoses and all orders for this visit:    1. Gastroesophageal reflux disease, esophagitis presence not specified  -    Start  Omeprazole delayed release (PRILOSEC D/R) 20 mg tablet; Take 1 Tab by mouth daily. 2. Uncontrolled type 2 diabetes mellitus with hyperglycemia (HCC)  -     AMB POC HEMOGLOBIN A1C- improved. Patient did not take Glipizide for the past 2 months. Advised to restart Glipizide and continue metformin and Trulicity. Last Point of Care HGB A1C  Hemoglobin A1c (POC)   Date Value Ref Range Status   06/15/2020 7.6 % Final        -     METABOLIC PANEL, COMPREHENSIVE  -     glipiZIDE (GLUCOTROL) 5 mg tablet; Take 1 Tab by mouth two (2) times a day. -     Yearly eye exam.  3. Hyperlipidemia LDL goal <70  -     LIPID PANEL  -     METABOLIC PANEL, COMPREHENSIVE              Continue Lipitor. 4. Benign hypertension  -    Well controlled. METABOLIC PANEL, COMPREHENSIVE           Medication risks/benefits/costs/interactions/alternatives discussed with patient. Advised patient to call back or return to office if symptoms worsen/change/persist. If patient cannot reach us or should anything more severe/urgent arise he/she should proceed directly to the nearest emergency department.   Discussed expected course/resolution/complications of diagnosis in detail with patient. Patient given a written after visit summary which includes her diagnoses, current medications and vitals. Patient expressed understanding with the diagnosis and plan. Follow-up and Dispositions    · Return in about 3 months (around 9/15/2020), or if symptoms worsen or fail to improve, for Cholesterol, blood pressure., Bring diabetic log book. Jeffrey Sanchez

## 2020-06-15 NOTE — PROGRESS NOTES
Geno Cano is a 60 y.o. female    Chief Complaint   Patient presents with    Diabetes     3 MOS F/U    Medication Refill     glipizide       1. Have you been to the ER, urgent care clinic since your last visit? Hospitalized since your last visit? No    2. Have you seen or consulted any other health care providers outside of the [address] [place] [address] since your last visit? Include any pap smears or colon screening. No    No flowsheet data found.      Health Maintenance Due   Topic Date Due    Eye Exam Retinal or Dilated  06/14/2019    Breast Cancer Screen Mammogram  03/14/2020    MICROALBUMIN Q1  07/05/2020

## 2020-06-16 LAB
ALBUMIN SERPL-MCNC: 4.7 G/DL (ref 3.8–4.8)
ALBUMIN/GLOB SERPL: 1.3 {RATIO} (ref 1.2–2.2)
ALP SERPL-CCNC: 42 IU/L (ref 39–117)
ALT SERPL-CCNC: 9 IU/L (ref 0–32)
AST SERPL-CCNC: 21 IU/L (ref 0–40)
BILIRUB SERPL-MCNC: 0.4 MG/DL (ref 0–1.2)
BUN SERPL-MCNC: 17 MG/DL (ref 8–27)
BUN/CREAT SERPL: 18 (ref 12–28)
CALCIUM SERPL-MCNC: 10.2 MG/DL (ref 8.7–10.3)
CHLORIDE SERPL-SCNC: 100 MMOL/L (ref 96–106)
CHOLEST SERPL-MCNC: 215 MG/DL (ref 100–199)
CO2 SERPL-SCNC: 23 MMOL/L (ref 20–29)
CREAT SERPL-MCNC: 0.97 MG/DL (ref 0.57–1)
GLOBULIN SER CALC-MCNC: 3.7 G/DL (ref 1.5–4.5)
GLUCOSE SERPL-MCNC: 122 MG/DL (ref 65–99)
HDLC SERPL-MCNC: 44 MG/DL
LDLC SERPL CALC-MCNC: 144 MG/DL (ref 0–99)
POTASSIUM SERPL-SCNC: 4.6 MMOL/L (ref 3.5–5.2)
PROT SERPL-MCNC: 8.4 G/DL (ref 6–8.5)
SODIUM SERPL-SCNC: 139 MMOL/L (ref 134–144)
TRIGL SERPL-MCNC: 136 MG/DL (ref 0–149)
VLDLC SERPL CALC-MCNC: 27 MG/DL (ref 5–40)

## 2020-06-19 ENCOUNTER — TELEPHONE (OUTPATIENT)
Dept: PRIMARY CARE CLINIC | Age: 65
End: 2020-06-19

## 2020-06-19 DIAGNOSIS — E78.5 HYPERLIPIDEMIA LDL GOAL <70: Primary | ICD-10-CM

## 2020-06-19 RX ORDER — ROSUVASTATIN CALCIUM 20 MG/1
20 TABLET, COATED ORAL
Qty: 90 TAB | Refills: 0 | Status: SHIPPED | OUTPATIENT
Start: 2020-06-19 | End: 2020-09-01

## 2020-06-19 NOTE — PROGRESS NOTES
Please call patient:    1. Cholesterol level went higher than last time. I don't think Lipitor 80 mg is working. I have sent Crestor 20 mg to the pharmacy. Please stop Lipitor and start Crestor and follow up in 3 months. 2. Kidney and liver function is fine.

## 2020-06-19 NOTE — TELEPHONE ENCOUNTER
----- Message from Roz Urena MD sent at 6/19/2020 12:25 PM EDT -----  Please call patient:    1. Cholesterol level went higher than last time. I don't think Lipitor 80 mg is working. I have sent Crestor 20 mg to the pharmacy. Please stop Lipitor and start Crestor and follow up in 3 months. 2. Kidney and liver function is fine.

## 2020-06-22 ENCOUNTER — TELEPHONE (OUTPATIENT)
Dept: PRIMARY CARE CLINIC | Age: 65
End: 2020-06-22

## 2020-09-18 ENCOUNTER — OFFICE VISIT (OUTPATIENT)
Dept: PRIMARY CARE CLINIC | Age: 65
End: 2020-09-18
Payer: COMMERCIAL

## 2020-09-18 VITALS
HEART RATE: 83 BPM | TEMPERATURE: 98.8 F | HEIGHT: 64 IN | DIASTOLIC BLOOD PRESSURE: 69 MMHG | BODY MASS INDEX: 27.49 KG/M2 | OXYGEN SATURATION: 99 % | WEIGHT: 161 LBS | SYSTOLIC BLOOD PRESSURE: 113 MMHG | RESPIRATION RATE: 16 BRPM

## 2020-09-18 DIAGNOSIS — E11.65 UNCONTROLLED TYPE 2 DIABETES MELLITUS WITH HYPERGLYCEMIA (HCC): ICD-10-CM

## 2020-09-18 DIAGNOSIS — E11.65 UNCONTROLLED TYPE 2 DIABETES MELLITUS WITH HYPERGLYCEMIA (HCC): Primary | ICD-10-CM

## 2020-09-18 DIAGNOSIS — I10 BENIGN HYPERTENSION: ICD-10-CM

## 2020-09-18 DIAGNOSIS — E78.5 HYPERLIPIDEMIA LDL GOAL <70: ICD-10-CM

## 2020-09-18 DIAGNOSIS — B37.2 CANDIDAL SKIN INFECTION: ICD-10-CM

## 2020-09-18 LAB — HBA1C MFR BLD HPLC: 7.1 %

## 2020-09-18 PROCEDURE — 83036 HEMOGLOBIN GLYCOSYLATED A1C: CPT | Performed by: FAMILY MEDICINE

## 2020-09-18 PROCEDURE — 99214 OFFICE O/P EST MOD 30 MIN: CPT | Performed by: FAMILY MEDICINE

## 2020-09-18 PROCEDURE — 3051F HG A1C>EQUAL 7.0%<8.0%: CPT | Performed by: FAMILY MEDICINE

## 2020-09-18 RX ORDER — NYSTATIN 100000 [USP'U]/G
POWDER TOPICAL 4 TIMES DAILY
Qty: 30 G | Refills: 3 | Status: SHIPPED | OUTPATIENT
Start: 2020-09-18 | End: 2021-03-22 | Stop reason: ALTCHOICE

## 2020-09-18 RX ORDER — FLASH GLUCOSE SENSOR
KIT MISCELLANEOUS
Qty: 2 KIT | Refills: 11 | Status: SHIPPED | OUTPATIENT
Start: 2020-09-18 | End: 2020-12-22 | Stop reason: SDUPTHER

## 2020-09-18 NOTE — PROGRESS NOTES
Chief Complaint   Patient presents with    Hypertension    Diabetes    Cholesterol Problem         1. Have you been to the ER, urgent care clinic since your last visit? Hospitalized since your last visit? no    2. Have you seen or consulted any other health care providers outside of the [address] [place] [address] since your last visit? Include any pap smears or colon screening. Yes pap in July 2020 normal results.

## 2020-09-18 NOTE — PROGRESS NOTES
Subjective:     Chief Complaint   Patient presents with    Hypertension    Diabetes    Cholesterol Problem        She  is a 60y.o. year old female with medical hx is significant for HTN, DM-2, HLD is here for follow up.         DM-2: She is  on Trulicity, metformin and Glipizide. Last A1c was 7.6. Reports that she has been taking Trulicity regularly however she is taking  Glipizide and Metformin mostly once/day. States that she forget to take the med at night mostly.     HTN: BP has been well controled with prinzide and amlodipine 5 mg.      HLD:  stopped Lipitor 80 mg and switched to Crestor 20 mg three months ago due to uncontrolled cholesterol level.  No side effects. Need refill of Nystatin. Lab Results   Component Value Date/Time    Cholesterol, total 215 (H) 06/15/2020 09:05 AM    HDL Cholesterol 44 06/15/2020 09:05 AM    LDL,Direct 149 (H) 06/04/2010 04:14 PM    LDL, calculated 144 (H) 06/15/2020 09:05 AM    VLDL, calculated 27 06/15/2020 09:05 AM    Triglyceride 136 06/15/2020 09:05 AM     Last Point of Care HGB A1C  Hemoglobin A1c (POC)   Date Value Ref Range Status   06/15/2020 7.6 % Final          Pertinent items are noted in HPI.   Objective:     Vitals:    09/18/20 0802   BP: 113/69   Pulse: 83   Resp: 16   Temp: 98.8 °F (37.1 °C)   TempSrc: Oral   SpO2: 99%   Weight: 161 lb (73 kg)   Height: 5' 4\" (1.626 m)       Physical Examination: General appearance - alert, well appearing, and in no distress, oriented to person, place, and time and overweight  Mental status - alert, oriented to person, place, and time, normal mood, behavior, speech, dress, motor activity, and thought processes  Chest - clear to auscultation, no wheezes, rales or rhonchi, symmetric air entry  Heart - normal rate, regular rhythm, normal S1, S2, no murmurs, rubs, clicks or gallops  Extremities - no pedal edema noted    No Known Allergies   Social History     Socioeconomic History    Marital status: SINGLE     Spouse name: Not on file    Number of children: Not on file    Years of education: Not on file    Highest education level: Not on file   Occupational History    Occupation:  8-4 pm     Comment: [employer]    Tobacco Use    Smoking status: Never Smoker    Smokeless tobacco: Never Used   Substance and Sexual Activity    Alcohol use: Not Currently     Alcohol/week: 0.0 standard drinks     Frequency: Monthly or less    Drug use: Never    Sexual activity: Yes     Partners: Male   Social History Narrative    , two sons and two grandchildren 24 yo grandson and 10 yo grandson whom she cares for full time. Family History   Problem Relation Age of Onset    Heart Disease Mother         CHF    Cancer Father         lung      Past Surgical History:   Procedure Laterality Date    HX BREAST BIOPSY Right       Past Medical History:   Diagnosis Date    Diabetes ([facility] [address]     Hypercholesterolemia     Hypertension       Current Outpatient Medications   Medication Sig Dispense Refill    lisinopril-hydroCHLOROthiazide (PRINZIDE, ZESTORETIC) 20-25 mg per tablet TAKE 1 TABLET DAILY 90 Tab 0    rosuvastatin (CRESTOR) 20 mg tablet TAKE 1 TABLET NIGHTLY 90 Tab 0    omeprazole (PRILOSEC) 20 mg capsule TAKE 1 CAPSULE DAILY 90 Cap 0    glipiZIDE (GLUCOTROL) 5 mg tablet Take 1 Tab by mouth two (2) times a day. 180 Tab 1    nystatin (MYCOSTATIN) powder Apply  to affected area four (4) times daily. 30 g 3    flash glucose sensor (FREESTYLE PAYTON 14 DAY SENSOR) kit Apply 1 sensor every 14 days 2 Kit 11    amLODIPine (NORVASC) 5 mg tablet Take 1 Tab by mouth daily. Indications: high blood pressure 90 Tab 3    dulaglutide (TRULICITY) 1.5 FI/2.3 mL sub-q pen 0.5 mL by SubCUTAneous route every seven (7) days.  12 Pen 1    metFORMIN (GLUCOPHAGE) 1,000 mg tablet TAKE 1 TABLET TWICE A DAY WITH MEALS 180 Tab 1    flash glucose scanning reader (FREESTYLE PAYTON 14 DAY READER) misc Use to check sugars throughout the day 1 Each 0    Insulin Needles, Disposable, (BD INSULIN PEN NEEDLE UF MINI) 31 gauge x 3/16\" ndle Use to inject insulin daily. 100 Pen Needle 3    Blood-Glucose Meter monitoring kit One Touch Ultra Mini Glucose Meter. Use to check BS daily. 1 Kit 0    glucose blood VI test strips (ASCENSIA AUTODISC VI, ONE TOUCH ULTRA TEST VI) strip One Touch Ultra Mini Test Strips. Use to check BS daily. 100 Strip 3    Lancets misc Use to check BS daily. 100 Each 3        Assessment/ Plan:   Diagnoses and all orders for this visit:    1. Uncontrolled type 2 diabetes mellitus with hyperglycemia (HCC)  -     AMB POC HEMOGLOBIN A1C has improved. Continue current med. discussed compliance. Last Point of Care HGB A1C  Hemoglobin A1c (POC)   Date Value Ref Range Status   09/18/2020 7.1 % Final        -     METABOLIC PANEL, COMPREHENSIVE; Future  -     MICROALBUMIN, UR, RAND W/ MICROALB/CREAT RATIO; Future  -     flash glucose sensor (Free[employer] Stephanie 14 Day Sensor) kit; Apply 1 sensor every 14 days    2. Benign hypertension  -    BP well controlled with current med. METABOLIC PANEL, COMPREHENSIVE; Future    3. Hyperlipidemia LDL goal <70  -     LIPID PANEL; Future  -     METABOLIC PANEL, COMPREHENSIVE; Future              Continue Crestor. Will notify result and recommendations. 4. Candidal skin infection  -  Stable with as needed  nystatin (MYCOSTATIN) powder; Apply  to affected area four (4) times daily. Medication risks/benefits/costs/interactions/alternatives discussed with patient. Advised patient to call back or return to office if symptoms worsen/change/persist. If patient cannot reach us or should anything more severe/urgent arise he/she should proceed directly to the nearest emergency department. Discussed expected course/resolution/complications of diagnosis in detail with patient. Patient given a written after visit summary which includes her diagnoses, current medications and vitals.   Patient expressed understanding with the diagnosis and plan. Flu vaccine in October first week. Follow-up and Dispositions    · Return in about 3 months (around 12/18/2020), or if symptoms worsen or fail to improve, for DM, HTN, HLD.

## 2020-09-20 LAB
ALBUMIN SERPL-MCNC: 4.9 G/DL (ref 3.8–4.8)
ALBUMIN/CREAT UR: 5 MG/G CREAT (ref 0–29)
ALBUMIN/GLOB SERPL: 1.4 {RATIO} (ref 1.2–2.2)
ALP SERPL-CCNC: 44 IU/L (ref 39–117)
ALT SERPL-CCNC: 9 IU/L (ref 0–32)
AST SERPL-CCNC: 22 IU/L (ref 0–40)
BILIRUB SERPL-MCNC: 0.4 MG/DL (ref 0–1.2)
BUN SERPL-MCNC: 21 MG/DL (ref 8–27)
BUN/CREAT SERPL: 21 (ref 12–28)
CALCIUM SERPL-MCNC: 10 MG/DL (ref 8.7–10.3)
CHLORIDE SERPL-SCNC: 101 MMOL/L (ref 96–106)
CHOLEST SERPL-MCNC: 141 MG/DL (ref 100–199)
CO2 SERPL-SCNC: 25 MMOL/L (ref 20–29)
CREAT SERPL-MCNC: 1 MG/DL (ref 0.57–1)
CREAT UR-MCNC: 105.6 MG/DL
GLOBULIN SER CALC-MCNC: 3.4 G/DL (ref 1.5–4.5)
GLUCOSE SERPL-MCNC: 135 MG/DL (ref 65–99)
HDLC SERPL-MCNC: 44 MG/DL
LDLC SERPL CALC-MCNC: 84 MG/DL (ref 0–99)
MICROALBUMIN UR-MCNC: 5.6 UG/ML
POTASSIUM SERPL-SCNC: 4.6 MMOL/L (ref 3.5–5.2)
PROT SERPL-MCNC: 8.3 G/DL (ref 6–8.5)
SODIUM SERPL-SCNC: 141 MMOL/L (ref 134–144)
TRIGL SERPL-MCNC: 64 MG/DL (ref 0–149)
VLDLC SERPL CALC-MCNC: 13 MG/DL (ref 5–40)

## 2020-09-22 NOTE — PROGRESS NOTES
Please mail letter:    Cholesterol level, kidney, liver function is normal. Continue current med. Urine is negative for protein. Great!

## 2020-10-21 DIAGNOSIS — I10 BENIGN HYPERTENSION: ICD-10-CM

## 2020-10-22 RX ORDER — AMLODIPINE BESYLATE 5 MG/1
TABLET ORAL
Qty: 90 TAB | Refills: 3 | Status: SHIPPED | OUTPATIENT
Start: 2020-10-22 | End: 2021-04-13 | Stop reason: SDUPTHER

## 2020-12-22 DIAGNOSIS — E11.65 UNCONTROLLED TYPE 2 DIABETES MELLITUS WITH HYPERGLYCEMIA (HCC): ICD-10-CM

## 2020-12-22 RX ORDER — GLIPIZIDE 5 MG/1
5 TABLET ORAL 2 TIMES DAILY
Qty: 180 TAB | Refills: 1 | Status: SHIPPED | OUTPATIENT
Start: 2020-12-22 | End: 2021-02-09 | Stop reason: SDUPTHER

## 2020-12-22 RX ORDER — FLASH GLUCOSE SENSOR
KIT MISCELLANEOUS
Qty: 2 KIT | Refills: 11 | Status: SHIPPED | OUTPATIENT
Start: 2020-12-22 | End: 2021-05-11 | Stop reason: SDUPTHER

## 2020-12-29 DIAGNOSIS — E11.65 UNCONTROLLED TYPE 2 DIABETES MELLITUS WITH HYPERGLYCEMIA (HCC): ICD-10-CM

## 2020-12-30 RX ORDER — FLASH GLUCOSE SENSOR
KIT MISCELLANEOUS
Qty: 2 KIT | Refills: 11 | OUTPATIENT
Start: 2020-12-30

## 2020-12-30 RX ORDER — GLIPIZIDE 5 MG/1
5 TABLET ORAL 2 TIMES DAILY
Qty: 180 TAB | Refills: 1 | OUTPATIENT
Start: 2020-12-30

## 2021-01-28 DIAGNOSIS — E11.65 UNCONTROLLED TYPE 2 DIABETES MELLITUS WITH HYPERGLYCEMIA (HCC): ICD-10-CM

## 2021-01-28 RX ORDER — GLIPIZIDE 5 MG/1
5 TABLET ORAL 2 TIMES DAILY
Qty: 180 TAB | Refills: 1 | Status: CANCELLED | OUTPATIENT
Start: 2021-01-28

## 2021-01-29 RX ORDER — DULAGLUTIDE 1.5 MG/.5ML
1.5 INJECTION, SOLUTION SUBCUTANEOUS
Qty: 12 EACH | Refills: 1 | Status: SHIPPED | OUTPATIENT
Start: 2021-01-29 | End: 2021-05-11 | Stop reason: DRUGHIGH

## 2021-02-09 ENCOUNTER — OFFICE VISIT (OUTPATIENT)
Dept: PRIMARY CARE CLINIC | Age: 66
End: 2021-02-09
Payer: MEDICARE

## 2021-02-09 VITALS
HEIGHT: 64 IN | DIASTOLIC BLOOD PRESSURE: 61 MMHG | BODY MASS INDEX: 28.54 KG/M2 | RESPIRATION RATE: 16 BRPM | TEMPERATURE: 98 F | SYSTOLIC BLOOD PRESSURE: 106 MMHG | OXYGEN SATURATION: 98 % | WEIGHT: 167.2 LBS | HEART RATE: 98 BPM

## 2021-02-09 DIAGNOSIS — E78.5 HYPERLIPIDEMIA LDL GOAL <70: ICD-10-CM

## 2021-02-09 DIAGNOSIS — E11.65 UNCONTROLLED TYPE 2 DIABETES MELLITUS WITH HYPERGLYCEMIA (HCC): Primary | ICD-10-CM

## 2021-02-09 DIAGNOSIS — I10 BENIGN HYPERTENSION: ICD-10-CM

## 2021-02-09 LAB — HBA1C MFR BLD HPLC: 7.6 %

## 2021-02-09 PROCEDURE — G8536 NO DOC ELDER MAL SCRN: HCPCS | Performed by: FAMILY MEDICINE

## 2021-02-09 PROCEDURE — 3017F COLORECTAL CA SCREEN DOC REV: CPT | Performed by: FAMILY MEDICINE

## 2021-02-09 PROCEDURE — 1101F PT FALLS ASSESS-DOCD LE1/YR: CPT | Performed by: FAMILY MEDICINE

## 2021-02-09 PROCEDURE — 99214 OFFICE O/P EST MOD 30 MIN: CPT | Performed by: FAMILY MEDICINE

## 2021-02-09 PROCEDURE — G8419 CALC BMI OUT NRM PARAM NOF/U: HCPCS | Performed by: FAMILY MEDICINE

## 2021-02-09 PROCEDURE — G8400 PT W/DXA NO RESULTS DOC: HCPCS | Performed by: FAMILY MEDICINE

## 2021-02-09 PROCEDURE — G9899 SCRN MAM PERF RSLTS DOC: HCPCS | Performed by: FAMILY MEDICINE

## 2021-02-09 PROCEDURE — 3051F HG A1C>EQUAL 7.0%<8.0%: CPT | Performed by: FAMILY MEDICINE

## 2021-02-09 PROCEDURE — 2022F DILAT RTA XM EVC RTNOPTHY: CPT | Performed by: FAMILY MEDICINE

## 2021-02-09 PROCEDURE — 83036 HEMOGLOBIN GLYCOSYLATED A1C: CPT | Performed by: FAMILY MEDICINE

## 2021-02-09 PROCEDURE — G8510 SCR DEP NEG, NO PLAN REQD: HCPCS | Performed by: FAMILY MEDICINE

## 2021-02-09 PROCEDURE — G8427 DOCREV CUR MEDS BY ELIG CLIN: HCPCS | Performed by: FAMILY MEDICINE

## 2021-02-09 PROCEDURE — G8754 DIAS BP LESS 90: HCPCS | Performed by: FAMILY MEDICINE

## 2021-02-09 PROCEDURE — G8752 SYS BP LESS 140: HCPCS | Performed by: FAMILY MEDICINE

## 2021-02-09 PROCEDURE — 1090F PRES/ABSN URINE INCON ASSESS: CPT | Performed by: FAMILY MEDICINE

## 2021-02-09 RX ORDER — GLIPIZIDE 5 MG/1
5 TABLET ORAL 2 TIMES DAILY
Qty: 180 TAB | Refills: 1 | Status: SHIPPED | OUTPATIENT
Start: 2021-02-09 | End: 2021-04-13 | Stop reason: SDUPTHER

## 2021-02-09 NOTE — PROGRESS NOTES
Chief Complaint   Patient presents with    Diabetes    Hypertension     3 most recent PHQ Screens 2/9/2021   Little interest or pleasure in doing things Not at all   Feeling down, depressed, irritable, or hopeless Not at all   Total Score PHQ 2 0     Abuse Screening Questionnaire 2/9/2021   Do you ever feel afraid of your partner? N   Are you in a relationship with someone who physically or mentally threatens you? N   Is it safe for you to go home? Y     Visit Vitals  /61 (BP 1 Location: Right upper arm, BP Patient Position: Sitting, BP Cuff Size: Small adult)   Pulse 98   Temp 98 °F (36.7 °C) (Oral)   Resp 16   Ht 5' 4\" (1.626 m)   Wt 167 lb 3.2 oz (75.8 kg)   SpO2 98%   BMI 28.70 kg/m²     1. Have you been to the ER, urgent care clinic since your last visit? Hospitalized since your last visit?no    2. Have you seen or consulted any other health care providers outside of the [address] [place] [address] since your last visit? Include any pap smears or colon screening.  no

## 2021-02-09 NOTE — PROGRESS NOTES
Subjective:     Chief Complaint   Patient presents with    Diabetes    Hypertension        She  is a 73y.o. year old female with medical hx is significant for HTN, DM-2, HLD is here for follow up.         DM-2: She is  on Trulicity, metformin and Glipizide. Last A1c was 7.1. Reports that she has been taking Trulicity and Metformin regularly however she did not have Glipizide for a month.     HTN: BP has been well controled with prinzide and amlodipine 5 mg.      HLD:  She has been taking Crestor 20 mg . Last LDL was 84    Lab Results   Component Value Date/Time    LDL,Direct 149 (H) 06/04/2010 04:14 PM    LDL, calculated 84 09/18/2020 12:00 AM    LDL, calculated 144 (H) 06/15/2020 09:05 AM       No side effects.       Pertinent items are noted in HPI.   Objective:     Vitals:    02/09/21 1512   BP: 106/61   Pulse: 98   Resp: 16   Temp: 98 °F (36.7 °C)   TempSrc: Oral   SpO2: 98%   Weight: 167 lb 3.2 oz (75.8 kg)   Height: 5' 4\" (1.626 m)       Physical Examination: General appearance - alert, well appearing, and in no distress, oriented to person, place, and time and overweight  Mental status - alert, oriented to person, place, and time, normal mood, behavior, speech, dress, motor activity, and thought processes  Chest - clear to auscultation, no wheezes, rales or rhonchi, symmetric air entry  Heart - normal rate, regular rhythm, normal S1, S2, no murmurs, rubs, clicks or gallops    No Known Allergies   Social History     Socioeconomic History    Marital status: SINGLE     Spouse name: Not on file    Number of children: Not on file    Years of education: Not on file    Highest education level: Not on file   Occupational History    Occupation:  8-4 pm     Comment: [employer]    Tobacco Use    Smoking status: Never Smoker    Smokeless tobacco: Never Used   Substance and Sexual Activity    Alcohol use: Not Currently     Alcohol/week: 0.0 standard drinks     Frequency: Monthly or less    Drug use: Never    Sexual activity: Yes     Partners: Male   Social History Narrative    , two sons and two grandchildren 24 yo grandson and 10 yo grandson whom she cares for full time. Family History   Problem Relation Age of Onset    Heart Disease Mother         CHF    Cancer Father         lung      Past Surgical History:   Procedure Laterality Date    HX BREAST BIOPSY Right       Past Medical History:   Diagnosis Date    Diabetes ([facility] [address]     Hypercholesterolemia     Hypertension       Current Outpatient Medications   Medication Sig Dispense Refill    dulaglutide (Trulicity) 1.5 RE/7.3 mL sub-q pen 0.5 mL by SubCUTAneous route every seven (7) days. 12 Each 1    lisinopril-hydroCHLOROthiazide (PRINZIDE, ZESTORETIC) 20-25 mg per tablet TAKE 1 TABLET DAILY 90 Tab 1    amLODIPine (NORVASC) 5 mg tablet TAKE 1 TABLET DAILY FOR HIGH BLOOD PRESSURE 90 Tab 3    rosuvastatin (CRESTOR) 20 mg tablet TAKE 1 TABLET NIGHTLY 90 Tab 0    omeprazole (PRILOSEC) 20 mg capsule TAKE 1 CAPSULE DAILY 90 Cap 0    metFORMIN (GLUCOPHAGE) 1,000 mg tablet TAKE 1 TABLET TWICE A DAY WITH MEALS 180 Tab 1    flash glucose scanning reader (FREESTYLE PAYTON 14 DAY READER) misc Use to check sugars throughout the day 1 Each 0    Insulin Needles, Disposable, (BD INSULIN PEN NEEDLE UF MINI) 31 gauge x 3/16\" ndle Use to inject insulin daily. 100 Pen Needle 3    Blood-Glucose Meter monitoring kit One Touch Ultra Mini Glucose Meter. Use to check BS daily. 1 Kit 0    glucose blood VI test strips (ASCENSIA AUTODISC VI, ONE TOUCH ULTRA TEST VI) strip One Touch Ultra Mini Test Strips. Use to check BS daily. 100 Strip 3    Lancets misc Use to check BS daily. 100 Each 3    flash glucose sensor (FreeStyle Payton 14 Day Sensor) kit Apply 1 sensor every 14 days 2 Kit 11    glipiZIDE (GLUCOTROL) 5 mg tablet Take 1 Tab by mouth two (2) times a day. 180 Tab 1    nystatin (MYCOSTATIN) powder Apply  to affected area four (4) times daily. 30 g 3        Assessment/ Plan:   Diagnoses and all orders for this visit:    1. Uncontrolled type 2 diabetes mellitus with hyperglycemia (HCC)  -     AMB POC HEMOGLOBIN A1C          Last Point of Care HGB A1C  Hemoglobin A1c (POC)   Date Value Ref Range Status   02/09/2021 7.6 % Final      Continue current med and restart Glipizide.  -     glipiZIDE (GLUCOTROL) 5 mg tablet; Take 1 Tab by mouth two (2) times a day. -  Yearly eye check up. 2. Benign hypertension      Well controlled with current med. 3. Hyperlipidemia LDL goal <70     Well controlled. Continue work on diet and exercise. Medication risks/benefits/costs/interactions/alternatives discussed with patient. Advised patient to call back or return to office if symptoms worsen/change/persist. If patient cannot reach us or should anything more severe/urgent arise he/she should proceed directly to the nearest emergency department. Discussed expected course/resolution/complications of diagnosis in detail with patient. Patient given a written after visit summary which includes her diagnoses, current medications and vitals. Patient expressed understanding with the diagnosis and plan. Follow-up and Dispositions    · Return in about 1 month (around 3/9/2021), or if symptoms worsen or fail to improve, for wellcome to medicare visit. Duc Chawla

## 2021-03-09 ENCOUNTER — OFFICE VISIT (OUTPATIENT)
Dept: PRIMARY CARE CLINIC | Age: 66
End: 2021-03-09
Payer: MEDICARE

## 2021-03-09 VITALS
OXYGEN SATURATION: 100 % | BODY MASS INDEX: 28.44 KG/M2 | HEIGHT: 64 IN | WEIGHT: 166.6 LBS | SYSTOLIC BLOOD PRESSURE: 134 MMHG | DIASTOLIC BLOOD PRESSURE: 83 MMHG | RESPIRATION RATE: 16 BRPM | HEART RATE: 85 BPM | TEMPERATURE: 97.5 F

## 2021-03-09 DIAGNOSIS — I10 BENIGN HYPERTENSION: ICD-10-CM

## 2021-03-09 DIAGNOSIS — Z71.89 ADVANCE DIRECTIVE DISCUSSED WITH PATIENT: ICD-10-CM

## 2021-03-09 DIAGNOSIS — Z00.00 WELCOME TO MEDICARE PREVENTIVE VISIT: Primary | ICD-10-CM

## 2021-03-09 DIAGNOSIS — Z23: ICD-10-CM

## 2021-03-09 DIAGNOSIS — E78.5 HYPERLIPIDEMIA LDL GOAL <70: ICD-10-CM

## 2021-03-09 DIAGNOSIS — E11.65 UNCONTROLLED TYPE 2 DIABETES MELLITUS WITH HYPERGLYCEMIA (HCC): ICD-10-CM

## 2021-03-09 DIAGNOSIS — R94.31 ABNORMAL EKG: ICD-10-CM

## 2021-03-09 DIAGNOSIS — Z13.31 SCREENING FOR DEPRESSION: ICD-10-CM

## 2021-03-09 DIAGNOSIS — Z78.0 POSTMENOPAUSAL STATE: ICD-10-CM

## 2021-03-09 DIAGNOSIS — Z86.79 HX OF RHEUMATIC FEVER: ICD-10-CM

## 2021-03-09 PROCEDURE — 1090F PRES/ABSN URINE INCON ASSESS: CPT | Performed by: FAMILY MEDICINE

## 2021-03-09 PROCEDURE — G8419 CALC BMI OUT NRM PARAM NOF/U: HCPCS | Performed by: FAMILY MEDICINE

## 2021-03-09 PROCEDURE — G0402 INITIAL PREVENTIVE EXAM: HCPCS | Performed by: FAMILY MEDICINE

## 2021-03-09 PROCEDURE — G8536 NO DOC ELDER MAL SCRN: HCPCS | Performed by: FAMILY MEDICINE

## 2021-03-09 PROCEDURE — G8427 DOCREV CUR MEDS BY ELIG CLIN: HCPCS | Performed by: FAMILY MEDICINE

## 2021-03-09 PROCEDURE — G0403 EKG FOR INITIAL PREVENT EXAM: HCPCS | Performed by: FAMILY MEDICINE

## 2021-03-09 PROCEDURE — G8510 SCR DEP NEG, NO PLAN REQD: HCPCS | Performed by: FAMILY MEDICINE

## 2021-03-09 NOTE — PATIENT INSTRUCTIONS
Medicare Wellness Visit, Female     The best way to live healthy is to have a lifestyle where you eat a well-balanced diet, exercise regularly, limit alcohol use, and quit all forms of tobacco/nicotine, if applicable.     Regular preventive services are another way to keep healthy. Preventive services (vaccines, screening tests, monitoring & exams) can help personalize your care plan, which helps you manage your own care. Screening tests can find health problems at the earliest stages, when they are easiest to treat.   [facility] follows the current, evidence-based guidelines published by the [place] Preventive Services Task Force (USPSTF) when recommending preventive services for our patients. Because we follow these guidelines, sometimes recommendations change over time as research supports it. (For example, mammograms used to be recommended annually. Even though Medicare will still pay for an annual mammogram, the newer guidelines recommend a mammogram every two years for women of average risk).  Of course, you and your doctor may decide to screen more often for some diseases, based on your risk and your co-morbidities (chronic disease you are already diagnosed with).     Preventive services for you include:  - Medicare offers their members a free annual wellness visit, which is time for you and your primary care provider to discuss and plan for your preventive service needs. Take advantage of this benefit every year!  -All adults over the age of 65 should receive the recommended pneumonia vaccines. Current USPSTF guidelines recommend a series of two vaccines for the best pneumonia protection.   -All adults should have a flu vaccine yearly and a tetanus vaccine every 10 years.   -All adults age 50 and older should receive the shingles vaccines (series of two vaccines).      -All adults age 40-70 who are overweight should have a diabetes screening test once every three years.   -All  adults born between [birth year] and 1965 should be screened once for Hepatitis C.  -Other screening tests and preventive services for persons with diabetes include: an eye exam to screen for diabetic retinopathy, a kidney function test, a foot exam, and stricter control over your cholesterol.   -Cardiovascular screening for adults with routine risk involves an electrocardiogram (ECG) at intervals determined by your doctor.   -Colorectal cancer screenings should be done for adults age 54-65 with no increased risk factors for colorectal cancer. There are a number of acceptable methods of screening for this type of cancer. Each test has its own benefits and drawbacks. Discuss with your doctor what is most appropriate for you during your annual wellness visit. The different tests include: colonoscopy (considered the best screening method), a fecal occult blood test, a fecal DNA test, and sigmoidoscopy.    -A bone mass density test is recommended when a woman turns 65 to screen for osteoporosis. This test is only recommended one time, as a screening. Some providers will use this same test as a disease monitoring tool if you already have osteoporosis. -Breast cancer screenings are recommended every other year for women of normal risk, age 54-69.  -Cervical cancer screenings for women over age 72 are only recommended with certain risk factors.      Here is a list of your current Health Maintenance items (your personalized list of preventive services) with a due date:  Health Maintenance Due   Topic Date Due    Eye Exam  06/14/2019    Bone Mineral Density   Never done    Pneumococcal Vaccine (1 of 1 - PPSV23) Never done    Diabetic Foot Care  02/17/2021

## 2021-03-09 NOTE — PROGRESS NOTES
This is a \"Welcome to Medicare\"  Initial Preventive Physical Examination (IPPE) providing Personalized Prevention Plan Services (Performed in the first 12 months of enrollment)    I have reviewed the patient's medical history in detail and updated the computerized patient record.     She  is a 66 y.o. year old female with medical hx is significant for HTN, DM-2, HLD is here for welcome to medicare.    Patient reports that she was diagnosed with rheumatic fever when she was young. She was told that she has whole in her heart.   Denies any chest pain, soa, cough.        DM-2: She is  on Trulicity, metformin and Glipizide. Last A1c was 7.6.    HTN: BP has been well controled with prinzide and amlodipine 5 mg.      HLD:  She has been taking Crestor 20 mg . Last LDL was 84    Her son Gallo ARMAS.    Depression Risk Screen     3 most recent PHQ Screens 3/9/2021   Little interest or pleasure in doing things Not at all   Feeling down, depressed, irritable, or hopeless Several days   Total Score PHQ 2 1       Alcohol Risk Screen    Do you average more than 1 drink per night or more than 7 drinks a week:  No    On any one occasion in the past three months have you have had more than 3 drinks containing alcohol:  No          Functional Ability and Level of Safety    Diet: No special diet      Hearing: Hearing is good.      Vision Screening:  Vision is good. wears glasses. has been following up with eye specialist regularly.  No exam data present      Activities of Daily Living:  The home contains: grab bars and rugs  Patient does total self care      Ambulation: with no difficulty      Exercise level: moderately active     Fall Risk Screen:  Fall Risk Assessment, last 12 mths 3/9/2021   Able to walk? Yes   Fall in past 12 months? 0   Do you feel unsteady? 0   Are you worried about falling 0      Abuse Screen:  Patient is not abused       Screening EKG   EKG order placed: Yes    End of Life Planning   Advanced care  planning directives were discussed with the patient and /or family/caregiver. Assessment/Plan   Education and counseling provided:  Are appropriate based on today's review and evaluation  End-of-Life planning (with patient's consent)  Pneumococcal Vaccine  Screening Mammography  Colorectal cancer screening tests  Bone mass measurement (DEXA)  Screening for glaucoma    Diagnoses and all orders for this visit:    1. Welcome to Medicare preventive visit  -     AMB POC EKG ROUTINE W/ 12 LEADS, SCREEN ()- No change from last EKGs . Patient is asymptomatic. Due to her co morbidities I recommend her to see a cardiologist.     2. Uncontrolled type 2 diabetes mellitus with hyperglycemia ([address]  -   Continue current meds. Follow up in 2 months. 3. Benign hypertension  -     REFERRAL TO CARDIOLOGY    4. Hyperlipidemia LDL goal <70  -     REFERRAL TO CARDIOLOGY    5. Screening for depression  -     DEPRESSION SCREEN ANNUAL    6. Postmenopausal state  -     DEXA BONE DENSITY STUDY AXIAL; Future    7. Advance directive discussed with patient  -     FULL CODE              Her son Neel Nye. She reports that she is working with  for living will. 8. Hx of rheumatic fever  -     REFERRAL TO CARDIOLOGY    9. Abnormal EKG  -     REFERRAL TO CARDIOLOGY    10.  Not vaccinated against Streptococcus pneumoniae        Health Maintenance Due     Health Maintenance Due   Topic Date Due    Eye Exam Retinal or Dilated  06/14/2019    Bone Densitometry (Dexa) Screening  Never done    Foot Exam Q1  02/17/2021       Patient Care Team   Patient Care Team:  Suki Santiago MD as PCP - General (Family Medicine)  [facility] Santiago [facility]  Coreen Paz MD (Obstetrics & Gynecology)    History     Past Medical History:   Diagnosis Date    Diabetes ([address]     Hypercholesterolemia     Hypertension       Past Surgical History:   Procedure Laterality Date    HX BREAST BIOPSY Right      Current Outpatient Medications   Medication Sig Dispense Refill    glipiZIDE (GLUCOTROL) 5 mg tablet Take 1 Tab by mouth two (2) times a day. 180 Tab 1    dulaglutide (Trulicity) 1.5 GX/9.5 mL sub-q pen 0.5 mL by SubCUTAneous route every seven (7) days. 12 Each 1    lisinopril-hydroCHLOROthiazide (PRINZIDE, ZESTORETIC) 20-25 mg per tablet TAKE 1 TABLET DAILY 90 Tab 1    amLODIPine (NORVASC) 5 mg tablet TAKE 1 TABLET DAILY FOR HIGH BLOOD PRESSURE 90 Tab 3    rosuvastatin (CRESTOR) 20 mg tablet TAKE 1 TABLET NIGHTLY 90 Tab 0    omeprazole (PRILOSEC) 20 mg capsule TAKE 1 CAPSULE DAILY 90 Cap 0    metFORMIN (GLUCOPHAGE) 1,000 mg tablet TAKE 1 TABLET TWICE A DAY WITH MEALS 180 Tab 1    Insulin Needles, Disposable, (BD INSULIN PEN NEEDLE UF MINI) 31 gauge x 3/16\" ndle Use to inject insulin daily. 100 Pen Needle 3    flash glucose sensor (FreeStyle Stephanie 14 Day Sensor) kit Apply 1 sensor every 14 days 2 Kit 11    nystatin (MYCOSTATIN) powder Apply  to affected area four (4) times daily. 30 g 3    flash glucose scanning reader (FREESTYLE STEPHANIE 14 DAY READER) misc Use to check sugars throughout the day 1 Each 0    Blood-Glucose Meter monitoring kit One Touch Ultra Mini Glucose Meter. Use to check BS daily. 1 Kit 0    glucose blood VI test strips (ASCENSIA AUTODISC VI, ONE TOUCH ULTRA TEST VI) strip One Touch Ultra Mini Test Strips. Use to check BS daily. 100 Strip 3    Lancets misc Use to check BS daily.  100 Each 3     No Known Allergies    Family History   Problem Relation Age of Onset    Heart Disease Mother         CHF    Cancer Father         lung     Social History     Tobacco Use    Smoking status: Never Smoker    Smokeless tobacco: Never Used   Substance Use Topics    Alcohol use: Not Currently     Alcohol/week: 0.0 standard drinks     Frequency: Monthly or less

## 2021-03-09 NOTE — PROGRESS NOTES
Tamara Ireland is a 73 y.o. female     Chief Complaint   Patient presents with    Annual Wellness Visit    Ringing in Ear     Patient c/o humming sound in right ear     1. Have you been to the ER, urgent care clinic since your last visit? Hospitalized since your last visit? No    2. Have you seen or consulted any other health care providers outside of the [address] [place] [address] since your last visit? Include any pap smears or colon screening.  No     Visit Vitals  Temp 97.5 °F (36.4 °C) (Temporal)   Ht 5' 4\" (1.626 m)   Wt 166 lb 9.6 oz (75.6 kg)   BMI 28.60 kg/m²

## 2021-03-22 ENCOUNTER — OFFICE VISIT (OUTPATIENT)
Dept: CARDIOLOGY CLINIC | Age: 66
End: 2021-03-22
Payer: MEDICARE

## 2021-03-22 VITALS
HEIGHT: 64 IN | HEART RATE: 100 BPM | OXYGEN SATURATION: 96 % | DIASTOLIC BLOOD PRESSURE: 80 MMHG | RESPIRATION RATE: 13 BRPM | BODY MASS INDEX: 28.51 KG/M2 | WEIGHT: 167 LBS | SYSTOLIC BLOOD PRESSURE: 120 MMHG

## 2021-03-22 DIAGNOSIS — I10 BENIGN HYPERTENSION: Primary | ICD-10-CM

## 2021-03-22 DIAGNOSIS — I00 RHEUMATIC FEVER: ICD-10-CM

## 2021-03-22 DIAGNOSIS — E78.5 HYPERLIPIDEMIA LDL GOAL <70: ICD-10-CM

## 2021-03-22 PROCEDURE — G8754 DIAS BP LESS 90: HCPCS | Performed by: SPECIALIST

## 2021-03-22 PROCEDURE — 1090F PRES/ABSN URINE INCON ASSESS: CPT | Performed by: SPECIALIST

## 2021-03-22 PROCEDURE — G8752 SYS BP LESS 140: HCPCS | Performed by: SPECIALIST

## 2021-03-22 PROCEDURE — 99204 OFFICE O/P NEW MOD 45 MIN: CPT | Performed by: SPECIALIST

## 2021-03-22 NOTE — PROGRESS NOTES
HISTORY OF PRESENT ILLNESS  Janel England is a 73 y.o. female     SUMMARY:   Problem List  Date Reviewed: 3/22/2021          Codes Class Noted    Gastroesophageal reflux disease ICD-10-CM: K21.9  ICD-9-CM: 530.81  6/15/2020        H/O colonoscopy with polypectomy ICD-10-CM: Z98.890, Z86.010  ICD-9-CM: V45.89, V12.72  12/30/2013    Overview Addendum 3/30/2016 10:37 AM by Zandra Low MD     2/16/2016 - Huber Douglas - f/u 3-5 yrs pending path (pt reports f/u in 5 years)             Pap smear for cervical cancer screening ICD-10-CM: Z12.4  ICD-9-CM: V76.2  9/30/2013    Overview Signed 9/30/2013  4:36 PM by Zandra Low MD     Feb 2013 - normal             Diabetes mellitus type 2, uncontrolled ([facility] ICD-10-CM: E11.65  ICD-9-CM: 250.02  12/28/2011        Hyperlipidemia LDL goal <70 ICD-10-CM: E78.5  ICD-9-CM: 272.4  12/28/2011        Benign hypertension ICD-10-CM: I10  ICD-9-CM: 401.1  11/7/2011              Current Outpatient Medications on File Prior to Visit   Medication Sig    glipiZIDE (GLUCOTROL) 5 mg tablet Take 1 Tab by mouth two (2) times a day.  dulaglutide (Trulicity) 1.5 IP/9.5 mL sub-q pen 0.5 mL by SubCUTAneous route every seven (7) days.  flash glucose sensor (Shop2Style Stephanie 14 Day Sensor) kit Apply 1 sensor every 14 days    lisinopril-hydroCHLOROthiazide (PRINZIDE, ZESTORETIC) 20-25 mg per tablet TAKE 1 TABLET DAILY    amLODIPine (NORVASC) 5 mg tablet TAKE 1 TABLET DAILY FOR HIGH BLOOD PRESSURE    rosuvastatin (CRESTOR) 20 mg tablet TAKE 1 TABLET NIGHTLY    omeprazole (PRILOSEC) 20 mg capsule TAKE 1 CAPSULE DAILY    metFORMIN (GLUCOPHAGE) 1,000 mg tablet TAKE 1 TABLET TWICE A DAY WITH MEALS    flash glucose scanning reader (SD MotiongraphiksSTYLE STEPHANIE 14 DAY READER) misc Use to check sugars throughout the day    Insulin Needles, Disposable, (BD INSULIN PEN NEEDLE UF MINI) 31 gauge x 3/16\" ndle Use to inject insulin daily.     Blood-Glucose Meter monitoring kit One Touch Ultra Mini Glucose Meter. Use to check BS daily.  glucose blood VI test strips (ASCENSIA AUTODISC VI, ONE TOUCH ULTRA TEST VI) strip One Touch Ultra Mini Test Strips. Use to check BS daily.  Lancets misc Use to check BS daily. No current facility-administered medications on file prior to visit. CARDIOLOGY STUDIES TO DATE:  No specialty comments available. Chief Complaint   Patient presents with    New Patient     HPI :  She is referred by her primary care for cardiac evaluation. Apparently in her early teen years she had rheumatic fever and was sick for more than a year on antibiotics and everything else. Several years ago she was having some funny chest pains and sounds like she had a cardiac evaluation and as far she knows everything was okay at that point. She just retired from [employer] and she is started [facility] [employer] which she is doing without any symptoms suggestive of angina or heart failure. She had a recent EKG which showed poor anterior R wave progression and left axis deviation no significant change from a tracing done 2 or 3 years ago. She is never smoked. She has hypertension, diabetes, hyperlipidemia, and a family history of premature coronary disease in her brother who is also a patient of mine. CARDIAC ROS:   negative for chest pain, dyspnea, palpitations, syncope, orthopnea, paroxysmal nocturnal dyspnea, exertional chest pressure/discomfort, claudication, lower extremity edema    Family History   Problem Relation Age of Onset    Heart Disease Mother         CHF    Cancer Father         lung    Heart Disease Brother         premature cad       Past Medical History:   Diagnosis Date    Diabetes ([facility] [address]     Hypercholesterolemia     Hypertension        GENERAL ROS:  A comprehensive review of systems was negative except for that written in the HPI.     Visit Vitals  /80 (BP 1 Location: Left arm, BP Patient Position: Sitting, BP Cuff Size: Adult)   Pulse 100   Resp 13   Ht 5' 4\" (1.626 m)   Wt 167 lb (75.8 kg)   SpO2 96%   BMI 28.67 kg/m²       Wt Readings from Last 3 Encounters:   03/22/21 167 lb (75.8 kg)   03/09/21 166 lb 9.6 oz (75.6 kg)   02/09/21 167 lb 3.2 oz (75.8 kg)            BP Readings from Last 3 Encounters:   03/22/21 120/80   03/09/21 134/83   02/09/21 106/61       PHYSICAL EXAM  General appearance: alert, cooperative, no distress, appears stated age  Neurologic: Alert and oriented X 3  Neck: supple, symmetrical, trachea midline, no adenopathy, no carotid bruit and no JVD  Lungs: clear to auscultation bilaterally  Heart: regular rate and rhythm, S1, S2 normal, no murmur, click, rub or gallop  Abdomen: soft, non-tender.  Bowel sounds normal. No masses,  no organomegaly  Extremities: extremities normal, atraumatic, no cyanosis or edema  Pulses: 2+ and symmetric    Lab Results   Component Value Date/Time    Cholesterol, total 141 09/18/2020 12:00 AM    Cholesterol, total 215 (H) 06/15/2020 09:05 AM    Cholesterol, total 204 (H) 02/17/2020 02:08 PM    Cholesterol, total 213 (H) 07/05/2019 09:12 AM    Cholesterol, total 242 (H) 07/26/2018 04:08 PM    HDL Cholesterol 44 09/18/2020 12:00 AM    HDL Cholesterol 44 06/15/2020 09:05 AM    HDL Cholesterol 41 02/17/2020 02:08 PM    HDL Cholesterol 41 07/05/2019 09:12 AM    HDL Cholesterol 42 07/26/2018 04:08 PM    LDL,Direct 149 (H) 06/04/2010 04:14 PM    LDL,Direct 181 (H) 01/29/2010 03:45 PM    LDL, calculated 84 09/18/2020 12:00 AM    LDL, calculated 144 (H) 06/15/2020 09:05 AM    LDL, calculated 126 (H) 02/17/2020 02:08 PM    LDL, calculated 133 (H) 07/05/2019 09:12 AM    LDL, calculated 155 (H) 07/26/2018 04:08 PM    LDL, calculated 137 (H) 02/27/2017 08:39 AM    Triglyceride 64 09/18/2020 12:00 AM    Triglyceride 136 06/15/2020 09:05 AM    Triglyceride 187 (H) 02/17/2020 02:08 PM    Triglyceride 193 (H) 07/05/2019 09:12 AM    Triglyceride 225 (H) 07/26/2018 04:08 PM     ASSESSMENT :      She does not have any worrisome symptoms however given her history I do think she needs an echocardiogram to see if there is anything valvular that would require ongoing surveillance and we talked about all that. We also talked about symptoms that would prompt an urgent return visit here or called 911  current treatment plan is effective, no change in therapy  lab results and schedule of future lab studies reviewed with patient  reviewed diet, exercise and weight control    Encounter Diagnoses   Name Primary?  Benign hypertension Yes    Hyperlipidemia LDL goal <70      No orders of the defined types were placed in this encounter. Follow-up and Dispositions    · Return in about 6 months (around 9/22/2021). Cas Galeas MD  3/22/2021  Please note that this dictation was completed with [employer], the [employer] voice recognition software. Quite often unanticipated grammatical, syntax, homophones, and other interpretive errors are inadvertently transcribed by the computer software. Please disregard these errors. Please excuse any errors that have escaped final proofreading. Thank you.

## 2021-04-07 ENCOUNTER — TELEPHONE (OUTPATIENT)
Dept: CARDIOLOGY CLINIC | Age: 66
End: 2021-04-07

## 2021-04-07 NOTE — TELEPHONE ENCOUNTER
[id number] Marcella Donald for colonoscopy without echo, however we should get echo at some point because she has history of rheumatic fever and need to know if any valve issues that would require period surveilance

## 2021-04-07 NOTE — TELEPHONE ENCOUNTER
Dr. Ngozi Castano with [facility] is requesting cardiac clearance for patient to undergo colonoscopy. Looks like patient did not show for echo scheduled for yesterday. Will fax clearance with last office note to fax # [phone] or call patient to r/s echo if needed prior to clearance.

## 2021-04-12 DIAGNOSIS — K21.9 GASTROESOPHAGEAL REFLUX DISEASE: ICD-10-CM

## 2021-04-12 DIAGNOSIS — E11.65 UNCONTROLLED TYPE 2 DIABETES MELLITUS WITH HYPERGLYCEMIA (HCC): ICD-10-CM

## 2021-04-12 DIAGNOSIS — E11.69 TYPE 2 DIABETES MELLITUS WITH OTHER SPECIFIED COMPLICATION, WITH LONG-TERM CURRENT USE OF INSULIN (HCC): ICD-10-CM

## 2021-04-12 DIAGNOSIS — I10 BENIGN HYPERTENSION: ICD-10-CM

## 2021-04-12 DIAGNOSIS — E78.5 HYPERLIPIDEMIA LDL GOAL <70: ICD-10-CM

## 2021-04-12 DIAGNOSIS — K21.9 GASTROESOPHAGEAL REFLUX DISEASE, UNSPECIFIED WHETHER ESOPHAGITIS PRESENT: Primary | ICD-10-CM

## 2021-04-12 DIAGNOSIS — Z79.4 TYPE 2 DIABETES MELLITUS WITH OTHER SPECIFIED COMPLICATION, WITH LONG-TERM CURRENT USE OF INSULIN (HCC): ICD-10-CM

## 2021-04-12 NOTE — TELEPHONE ENCOUNTER
Patient called stating she needs all of her medications sent to her new mail order pharmacy [facility]--phone number for mail service is [phone].

## 2021-04-14 RX ORDER — LISINOPRIL AND HYDROCHLOROTHIAZIDE 20; 25 MG/1; MG/1
TABLET ORAL
Qty: 90 TAB | Refills: 0 | Status: SHIPPED | OUTPATIENT
Start: 2021-04-14 | End: 2021-06-15

## 2021-04-14 RX ORDER — GLIPIZIDE 5 MG/1
5 TABLET ORAL 2 TIMES DAILY
Qty: 180 TAB | Refills: 0 | Status: SHIPPED | OUTPATIENT
Start: 2021-04-14 | End: 2021-10-07

## 2021-04-14 RX ORDER — OMEPRAZOLE 20 MG/1
CAPSULE, DELAYED RELEASE ORAL
Qty: 90 CAP | Refills: 0 | Status: SHIPPED | OUTPATIENT
Start: 2021-04-14 | End: 2021-05-18

## 2021-04-14 RX ORDER — ROSUVASTATIN CALCIUM 20 MG/1
TABLET, COATED ORAL
Qty: 90 TAB | Refills: 0 | Status: SHIPPED | OUTPATIENT
Start: 2021-04-14 | End: 2021-06-15

## 2021-04-14 RX ORDER — METFORMIN HYDROCHLORIDE 1000 MG/1
TABLET ORAL
Qty: 180 TAB | Refills: 1 | Status: SHIPPED | OUTPATIENT
Start: 2021-04-14 | End: 2021-08-11

## 2021-04-14 RX ORDER — AMLODIPINE BESYLATE 5 MG/1
TABLET ORAL
Qty: 90 TAB | Refills: 0 | Status: SHIPPED | OUTPATIENT
Start: 2021-04-14 | End: 2022-05-16 | Stop reason: SDUPTHER

## 2021-05-08 ENCOUNTER — TRANSCRIBE ORDER (OUTPATIENT)
Dept: REGISTRATION | Age: 66
End: 2021-05-08

## 2021-05-08 ENCOUNTER — HOSPITAL ENCOUNTER (OUTPATIENT)
Dept: PREADMISSION TESTING | Age: 66
Discharge: HOME | End: 2021-05-08
Payer: MEDICARE

## 2021-05-08 DIAGNOSIS — Z01.812 PRE-PROCEDURE LAB EXAM: ICD-10-CM

## 2021-05-08 DIAGNOSIS — Z01.812 PRE-PROCEDURE LAB EXAM: Primary | ICD-10-CM

## 2021-05-08 PROCEDURE — U0003 INFECTIOUS AGENT DETECTION BY NUCLEIC ACID (DNA OR RNA); SEVERE ACUTE RESPIRATORY SYNDROME CORONAVIRUS 2 (SARS-COV-2) (CORONAVIRUS DISEASE [COVID-19]), AMPLIFIED PROBE TECHNIQUE, MAKING USE OF HIGH THROUGHPUT TECHNOLOGIES AS DESCRIBED BY CMS-2020-01-R: HCPCS

## 2021-05-09 LAB — SARS-COV-2, COV2NT: NOT DETECTED

## 2021-05-11 ENCOUNTER — OFFICE VISIT (OUTPATIENT)
Dept: PRIMARY CARE CLINIC | Age: 66
End: 2021-05-11
Payer: MEDICARE

## 2021-05-11 VITALS
WEIGHT: 170.2 LBS | RESPIRATION RATE: 16 BRPM | DIASTOLIC BLOOD PRESSURE: 69 MMHG | SYSTOLIC BLOOD PRESSURE: 125 MMHG | HEART RATE: 81 BPM | HEIGHT: 64 IN | TEMPERATURE: 98.4 F | OXYGEN SATURATION: 100 % | BODY MASS INDEX: 29.06 KG/M2

## 2021-05-11 DIAGNOSIS — I10 BENIGN HYPERTENSION: ICD-10-CM

## 2021-05-11 DIAGNOSIS — E78.5 HYPERLIPIDEMIA LDL GOAL <70: ICD-10-CM

## 2021-05-11 DIAGNOSIS — Z23 ENCOUNTER FOR IMMUNIZATION: ICD-10-CM

## 2021-05-11 DIAGNOSIS — E11.65 UNCONTROLLED TYPE 2 DIABETES MELLITUS WITH HYPERGLYCEMIA (HCC): Primary | ICD-10-CM

## 2021-05-11 LAB — HBA1C MFR BLD HPLC: 7.9 %

## 2021-05-11 PROCEDURE — 2022F DILAT RTA XM EVC RTNOPTHY: CPT | Performed by: FAMILY MEDICINE

## 2021-05-11 PROCEDURE — 1101F PT FALLS ASSESS-DOCD LE1/YR: CPT | Performed by: FAMILY MEDICINE

## 2021-05-11 PROCEDURE — G8400 PT W/DXA NO RESULTS DOC: HCPCS | Performed by: FAMILY MEDICINE

## 2021-05-11 PROCEDURE — 3051F HG A1C>EQUAL 7.0%<8.0%: CPT | Performed by: FAMILY MEDICINE

## 2021-05-11 PROCEDURE — G8752 SYS BP LESS 140: HCPCS | Performed by: FAMILY MEDICINE

## 2021-05-11 PROCEDURE — 90732 PPSV23 VACC 2 YRS+ SUBQ/IM: CPT | Performed by: FAMILY MEDICINE

## 2021-05-11 PROCEDURE — G8427 DOCREV CUR MEDS BY ELIG CLIN: HCPCS | Performed by: FAMILY MEDICINE

## 2021-05-11 PROCEDURE — 99214 OFFICE O/P EST MOD 30 MIN: CPT | Performed by: FAMILY MEDICINE

## 2021-05-11 PROCEDURE — 3017F COLORECTAL CA SCREEN DOC REV: CPT | Performed by: FAMILY MEDICINE

## 2021-05-11 PROCEDURE — G8536 NO DOC ELDER MAL SCRN: HCPCS | Performed by: FAMILY MEDICINE

## 2021-05-11 PROCEDURE — G9899 SCRN MAM PERF RSLTS DOC: HCPCS | Performed by: FAMILY MEDICINE

## 2021-05-11 PROCEDURE — 1090F PRES/ABSN URINE INCON ASSESS: CPT | Performed by: FAMILY MEDICINE

## 2021-05-11 PROCEDURE — G8510 SCR DEP NEG, NO PLAN REQD: HCPCS | Performed by: FAMILY MEDICINE

## 2021-05-11 PROCEDURE — 83036 HEMOGLOBIN GLYCOSYLATED A1C: CPT | Performed by: FAMILY MEDICINE

## 2021-05-11 PROCEDURE — G8419 CALC BMI OUT NRM PARAM NOF/U: HCPCS | Performed by: FAMILY MEDICINE

## 2021-05-11 PROCEDURE — G0009 ADMIN PNEUMOCOCCAL VACCINE: HCPCS | Performed by: FAMILY MEDICINE

## 2021-05-11 PROCEDURE — G8754 DIAS BP LESS 90: HCPCS | Performed by: FAMILY MEDICINE

## 2021-05-11 RX ORDER — DULAGLUTIDE 3 MG/.5ML
3 INJECTION, SOLUTION SUBCUTANEOUS
Qty: 3 BOX | Refills: 1 | Status: SHIPPED | OUTPATIENT
Start: 2021-05-11 | End: 2022-05-16 | Stop reason: SDUPTHER

## 2021-05-11 RX ORDER — FLASH GLUCOSE SENSOR
KIT MISCELLANEOUS
Qty: 2 KIT | Refills: 11 | Status: ON HOLD | OUTPATIENT
Start: 2021-05-11 | End: 2021-05-12 | Stop reason: CLARIF

## 2021-05-11 RX ORDER — FLASH GLUCOSE SCANNING READER
EACH MISCELLANEOUS
Qty: 1 EACH | Refills: 1 | Status: ON HOLD | OUTPATIENT
Start: 2021-05-11 | End: 2021-05-12 | Stop reason: CLARIF

## 2021-05-11 NOTE — PROGRESS NOTES
Subjective:     Chief Complaint   Patient presents with    Hypertension    Diabetes Care Management         She  is a 73y.o. year old female medical hx is significant for HTN, DM-2, HLD is here for follow up.         DM-2: She is  on Trulicity, metformin and Glipizide. Last A1c was 7. 6.      HTN: BP has been well controled with prinzide and amlodipine 5 mg.      HLD:  She has been taking Crestor 20 mg . Last LDL was 84    Denies any CP, soa, cough. Lab Results   Component Value Date/Time    Cholesterol, total 141 09/18/2020 12:00 AM    HDL Cholesterol 44 09/18/2020 12:00 AM    LDL,Direct 149 (H) 06/04/2010 04:14 PM    LDL, calculated 84 09/18/2020 12:00 AM    LDL, calculated 144 (H) 06/15/2020 09:05 AM    VLDL, calculated 13 09/18/2020 12:00 AM    VLDL, calculated 27 06/15/2020 09:05 AM    Triglyceride 64 09/18/2020 12:00 AM         Pertinent items are noted in HPI.   Objective:     Vitals:    05/11/21 0931   BP: 125/69   Pulse: 81   Resp: 16   Temp: 98.4 °F (36.9 °C)   TempSrc: Temporal   SpO2: 100%   Weight: 170 lb 3.2 oz (77.2 kg)   Height: 5' 4\" (1.626 m)       Physical Examination: General appearance - alert, well appearing, and in no distress, oriented to person, place, and time and overweight  Mental status - alert, oriented to person, place, and time, normal mood, behavior, speech, dress, motor activity, and thought processes  Chest - clear to auscultation, no wheezes, rales or rhonchi, symmetric air entry  Heart - normal rate, regular rhythm, normal S1, S2, no murmurs, rubs, clicks or gallops  Extremities - peripheral pulses normal, no pedal edema, no clubbing or cyanosis, feet normal, good pulses, normal color, temperature and sensation, monofilament sensory exam is normal in both feet    No Known Allergies   Social History     Socioeconomic History    Marital status: SINGLE     Spouse name: Not on file    Number of children: Not on file    Years of education: Not on file    Highest education level: Not on file   Occupational History    Occupation:  8-4 pm     Comment: [employer]    Tobacco Use    Smoking status: Never Smoker    Smokeless tobacco: Never Used   Substance and Sexual Activity    Alcohol use: Yes     Alcohol/week: 0.0 standard drinks     Frequency: Monthly or less     Comment: 1-2 times a yr    Drug use: Never    Sexual activity: Yes     Partners: Male   Social History Narrative    , two sons and two grandchildren 26 yo grandson and 10 yo grandson whom she cares for full time. Family History   Problem Relation Age of Onset    Heart Disease Mother         CHF    Cancer Father         lung    Heart Disease Brother         premature cad    Cancer Brother         throat    Heart Disease Sister     Colon Cancer Brother     Breast Cancer Sister     Breast Cancer Sister       Past Surgical History:   Procedure Laterality Date    HX BREAST BIOPSY Right     benign      Past Medical History:   Diagnosis Date    Diabetes ([facility] [address]     GERD (gastroesophageal reflux disease)     Hypercholesterolemia     Hypertension     Rheumatic fever       Current Outpatient Medications   Medication Sig Dispense Refill    amLODIPine (NORVASC) 5 mg tablet TAKE 1 TABLET DAILY FOR HIGH BLOOD PRESSURE 90 Tab 0    glipiZIDE (GLUCOTROL) 5 mg tablet Take 1 Tab by mouth two (2) times a day. 180 Tab 0    lisinopril-hydroCHLOROthiazide (PRINZIDE, ZESTORETIC) 20-25 mg per tablet TAKE 1 TABLET DAILY 90 Tab 0    metFORMIN (GLUCOPHAGE) 1,000 mg tablet TAKE 1 TABLET TWICE A DAY WITH MEALS 180 Tab 1    omeprazole (PRILOSEC) 20 mg capsule TAKE 1 CAPSULE DAILY 90 Cap 0    rosuvastatin (CRESTOR) 20 mg tablet TAKE 1 TABLET NIGHTLY 90 Tab 0    dulaglutide (Trulicity) 1.5 XL/0.0 mL sub-q pen 0.5 mL by SubCUTAneous route every seven (7) days.  12 Each 1    flash glucose sensor (FreeStyle Stephanie 14 Day Sensor) kit Apply 1 sensor every 14 days 2 Kit 11    flash glucose scanning reader (FREESTYLE PAYTON 14 DAY READER) misc Use to check sugars throughout the day 1 Each 0    Insulin Needles, Disposable, (BD INSULIN PEN NEEDLE UF MINI) 31 gauge x 3/16\" ndle Use to inject insulin daily. 100 Pen Needle 3    Blood-Glucose Meter monitoring kit One Touch Ultra Mini Glucose Meter. Use to check BS daily. 1 Kit 0    Lancets misc Use to check BS daily. 100 Each 3    glucose blood VI test strips (ASCENSIA AUTODISC VI, ONE TOUCH ULTRA TEST VI) strip One Touch Ultra Mini Test Strips. Use to check BS daily. 100 Strip 3        Assessment/ Plan:   Diagnoses and all orders for this visit:    1. Uncontrolled type 2 diabetes mellitus with hyperglycemia (HCC)  -     AMB POC HEMOGLOBIN A1C- went up. Last Point of Care HGB A1C  Hemoglobin A1c (POC)   Date Value Ref Range Status   05/11/2021 7.9 % Final        -     Increase dulaglutide (Trulicity) 3 WN/3.7 mL pnij; 3 mg by SubCUTAneous route every seven (7) days. -      DIABETES FOOT EXAM  -     flash glucose sensor (FreeStyle Payton 14 Day Sensor) kit; Apply 1 sensor every 14 days  -     flash glucose scanning reader (FreeStyle Payton 14 Day [place]ak) misc; Use to check sugars throughout the day         Continue all other med. 2. Benign hypertension      Well controlled. 3. Hyperlipidemia LDL goal <70      Well controlled. 4. Encounter for immunization  -     PNEUMOCOCCAL POLYSACCHARIDE VACCINE, 23-VALENT, ADULT OR IMMUNOSUPPRESSED PT DOSE,           Medication risks/benefits/costs/interactions/alternatives discussed with patient. Advised patient to call back or return to office if symptoms worsen/change/persist. If patient cannot reach us or should anything more severe/urgent arise he/she should proceed directly to the nearest emergency department. Discussed expected course/resolution/complications of diagnosis in detail with patient. Patient given a written after visit summary which includes her diagnoses, current medications and vitals.   Patient expressed understanding with the diagnosis and plan. Follow-up and Dispositions    · Return in about 3 months (around 8/11/2021), or if symptoms worsen or fail to improve, for Cholesterol, blood pressure, diabetes, Bring diabetic log book. Sigifredo Gandara

## 2021-05-11 NOTE — PROGRESS NOTES
Identified pt with two pt identifiers(name and ). Chief Complaint   Patient presents with    Hypertension    Diabetes Care Management         3 most recent PHQ Screens 2021   Little interest or pleasure in doing things Not at all   Feeling down, depressed, irritable, or hopeless Not at all   Total Score PHQ 2 0        Vitals:    21 0931   BP: 125/69   Pulse: 81   Resp: 16   Temp: 98.4 °F (36.9 °C)   TempSrc: Temporal   SpO2: 100%   Weight: 170 lb 3.2 oz (77.2 kg)   Height: 5' 4\" (1.626 m)   PainSc:   0 - No pain       Health Maintenance Due   Topic    Eye Exam Retinal or Dilated     Bone Densitometry (Dexa) Screening     Pneumococcal 65+ years (1 of 1 - PPSV23)    Foot Exam Q1        1. Have you been to the ER, urgent care clinic since your last visit? Hospitalized since your last visit? No    2. Have you seen or consulted any other health care providers outside of the [address] [place] [address] since your last visit? Include any pap smears or colon screening.  Colonoscopy - tomorrow

## 2021-05-12 ENCOUNTER — ANESTHESIA EVENT (OUTPATIENT)
Dept: ENDOSCOPY | Age: 66
End: 2021-05-12
Payer: MEDICARE

## 2021-05-12 ENCOUNTER — HOSPITAL ENCOUNTER (OUTPATIENT)
Age: 66
Setting detail: OUTPATIENT SURGERY
Discharge: HOME | End: 2021-05-12
Attending: INTERNAL MEDICINE | Admitting: INTERNAL MEDICINE
Payer: MEDICARE

## 2021-05-12 ENCOUNTER — ANESTHESIA (OUTPATIENT)
Dept: ENDOSCOPY | Age: 66
End: 2021-05-12
Payer: MEDICARE

## 2021-05-12 VITALS
DIASTOLIC BLOOD PRESSURE: 80 MMHG | BODY MASS INDEX: 28.78 KG/M2 | WEIGHT: 168.56 LBS | OXYGEN SATURATION: 93 % | SYSTOLIC BLOOD PRESSURE: 126 MMHG | HEIGHT: 64 IN | TEMPERATURE: 98 F | HEART RATE: 88 BPM | RESPIRATION RATE: 18 BRPM

## 2021-05-12 PROCEDURE — 76040000019: Performed by: INTERNAL MEDICINE

## 2021-05-12 PROCEDURE — 74011000250 HC RX REV CODE- 250: Performed by: ANESTHESIOLOGY

## 2021-05-12 PROCEDURE — 74011250636 HC RX REV CODE- 250/636: Performed by: ANESTHESIOLOGY

## 2021-05-12 PROCEDURE — 74011000258 HC RX REV CODE- 258: Performed by: ANESTHESIOLOGY

## 2021-05-12 PROCEDURE — 76060000031 HC ANESTHESIA FIRST 0.5 HR: Performed by: INTERNAL MEDICINE

## 2021-05-12 RX ORDER — MIDAZOLAM HYDROCHLORIDE 1 MG/ML
.25-5 INJECTION, SOLUTION INTRAMUSCULAR; INTRAVENOUS
Status: DISCONTINUED | OUTPATIENT
Start: 2021-05-12 | End: 2021-05-12 | Stop reason: HOSPADM

## 2021-05-12 RX ORDER — SODIUM CHLORIDE 9 MG/ML
50 INJECTION, SOLUTION INTRAVENOUS CONTINUOUS
Status: DISCONTINUED | OUTPATIENT
Start: 2021-05-12 | End: 2021-05-12 | Stop reason: HOSPADM

## 2021-05-12 RX ORDER — LIDOCAINE HYDROCHLORIDE 20 MG/ML
INJECTION, SOLUTION EPIDURAL; INFILTRATION; INTRACAUDAL; PERINEURAL AS NEEDED
Status: DISCONTINUED | OUTPATIENT
Start: 2021-05-12 | End: 2021-05-12 | Stop reason: HOSPADM

## 2021-05-12 RX ORDER — PHENYLEPHRINE HCL IN 0.9% NACL 0.4MG/10ML
SYRINGE (ML) INTRAVENOUS AS NEEDED
Status: DISCONTINUED | OUTPATIENT
Start: 2021-05-12 | End: 2021-05-12 | Stop reason: HOSPADM

## 2021-05-12 RX ORDER — SODIUM CHLORIDE 0.9 % (FLUSH) 0.9 %
5-40 SYRINGE (ML) INJECTION EVERY 8 HOURS
Status: DISCONTINUED | OUTPATIENT
Start: 2021-05-12 | End: 2021-05-12 | Stop reason: HOSPADM

## 2021-05-12 RX ORDER — SODIUM CHLORIDE 9 MG/ML
INJECTION, SOLUTION INTRAVENOUS
Status: DISCONTINUED | OUTPATIENT
Start: 2021-05-12 | End: 2021-05-12 | Stop reason: HOSPADM

## 2021-05-12 RX ORDER — FLUMAZENIL 0.1 MG/ML
0.2 INJECTION INTRAVENOUS
Status: DISCONTINUED | OUTPATIENT
Start: 2021-05-12 | End: 2021-05-12 | Stop reason: HOSPADM

## 2021-05-12 RX ORDER — FENTANYL CITRATE 50 UG/ML
25-200 INJECTION, SOLUTION INTRAMUSCULAR; INTRAVENOUS
Status: DISCONTINUED | OUTPATIENT
Start: 2021-05-12 | End: 2021-05-12 | Stop reason: HOSPADM

## 2021-05-12 RX ORDER — EPINEPHRINE 0.1 MG/ML
1 INJECTION INTRACARDIAC; INTRAVENOUS
Status: DISCONTINUED | OUTPATIENT
Start: 2021-05-12 | End: 2021-05-12 | Stop reason: HOSPADM

## 2021-05-12 RX ORDER — DEXTROMETHORPHAN/PSEUDOEPHED 2.5-7.5/.8
1.2 DROPS ORAL
Status: DISCONTINUED | OUTPATIENT
Start: 2021-05-12 | End: 2021-05-12 | Stop reason: HOSPADM

## 2021-05-12 RX ORDER — PROPOFOL 10 MG/ML
INJECTION, EMULSION INTRAVENOUS AS NEEDED
Status: DISCONTINUED | OUTPATIENT
Start: 2021-05-12 | End: 2021-05-12 | Stop reason: HOSPADM

## 2021-05-12 RX ORDER — ATROPINE SULFATE 0.1 MG/ML
0.5 INJECTION INTRAVENOUS
Status: DISCONTINUED | OUTPATIENT
Start: 2021-05-12 | End: 2021-05-12 | Stop reason: HOSPADM

## 2021-05-12 RX ORDER — NALOXONE HYDROCHLORIDE 0.4 MG/ML
0.4 INJECTION, SOLUTION INTRAMUSCULAR; INTRAVENOUS; SUBCUTANEOUS
Status: DISCONTINUED | OUTPATIENT
Start: 2021-05-12 | End: 2021-05-12 | Stop reason: HOSPADM

## 2021-05-12 RX ORDER — SODIUM CHLORIDE 0.9 % (FLUSH) 0.9 %
5-40 SYRINGE (ML) INJECTION AS NEEDED
Status: DISCONTINUED | OUTPATIENT
Start: 2021-05-12 | End: 2021-05-12 | Stop reason: HOSPADM

## 2021-05-12 RX ADMIN — SODIUM CHLORIDE: 9 INJECTION, SOLUTION INTRAVENOUS at 14:28

## 2021-05-12 RX ADMIN — LIDOCAINE HYDROCHLORIDE 60 MG: 20 INJECTION, SOLUTION EPIDURAL; INFILTRATION; INTRACAUDAL; PERINEURAL at 14:37

## 2021-05-12 RX ADMIN — PROPOFOL 50 MG: 10 INJECTION, EMULSION INTRAVENOUS at 14:40

## 2021-05-12 RX ADMIN — PROPOFOL 70 MG: 10 INJECTION, EMULSION INTRAVENOUS at 14:38

## 2021-05-12 RX ADMIN — Medication 120 MCG: at 14:41

## 2021-05-12 RX ADMIN — PROPOFOL 40 MG: 10 INJECTION, EMULSION INTRAVENOUS at 14:43

## 2021-05-12 NOTE — H&P
[address]  [address]      History and Physical       NAME:  Ricardo Akins   :   1955   MRN:   [id number]             History of Present Illness:  Patient is a 73 y.o. who is seen for fhx of colon cancer. PMH:  Past Medical History:   Diagnosis Date    Diabetes ([address]     GERD (gastroesophageal reflux disease)     Hypercholesterolemia     Hypertension     Rheumatic fever        PSH:  Past Surgical History:   Procedure Laterality Date    HX BREAST BIOPSY Right     benign       Allergies:  No Known Allergies    Home Medications:  Prior to Admission Medications   Prescriptions Last Dose Informant Patient Reported? Taking? amLODIPine (NORVASC) 5 mg tablet 2021 at Unknown time  No Yes   Sig: TAKE 1 TABLET DAILY FOR HIGH BLOOD PRESSURE   dulaglutide (Trulicity) 3 XZ/3.7 mL pnij 2021  No No   Sig: 3 mg by SubCUTAneous route every seven (7) days. glipiZIDE (GLUCOTROL) 5 mg tablet 2021 at Unknown time  No Yes   Sig: Take 1 Tab by mouth two (2) times a day.    lisinopril-hydroCHLOROthiazide (PRINZIDE, ZESTORETIC) 20-25 mg per tablet 2021 at Unknown time  No Yes   Sig: TAKE 1 TABLET DAILY   metFORMIN (GLUCOPHAGE) 1,000 mg tablet 2021 at Unknown time  No Yes   Sig: TAKE 1 TABLET TWICE A DAY WITH MEALS   omeprazole (PRILOSEC) 20 mg capsule 2021 at Unknown time  No Yes   Sig: TAKE 1 CAPSULE DAILY   rosuvastatin (CRESTOR) 20 mg tablet 2021 at Unknown time  No Yes   Sig: TAKE 1 TABLET NIGHTLY      Facility-Administered Medications: None       Hospital Medications:  Current Facility-Administered Medications   Medication Dose Route Frequency    0.9% sodium chloride infusion  50 mL/hr IntraVENous CONTINUOUS    sodium chloride (NS) flush 5-40 mL  5-40 mL IntraVENous Q8H    sodium chloride (NS) flush 5-40 mL  5-40 mL IntraVENous PRN    midazolam (VERSED) injection 0.25-5 mg  0.25-5 mg IntraVENous Multiple    fentaNYL citrate (PF) injection  mcg   mcg IntraVENous Multiple    naloxone (NARCAN) injection 0.4 mg  0.4 mg IntraVENous Multiple    flumazeniL (ROMAZICON) 0.1 mg/mL injection 0.2 mg  0.2 mg IntraVENous Multiple    simethicone (MYLICON) 40VD/3.0SZ oral drops 80 mg  1.2 mL Oral Multiple    atropine injection 0.5 mg  0.5 mg IntraVENous ONCE PRN    EPINEPHrine (ADRENALIN) 0.1 mg/mL syringe 1 mg  1 mg Endoscopically ONCE PRN     Facility-Administered Medications Ordered in Other Encounters   Medication Dose Route Frequency    0.9% sodium chloride infusion   IntraVENous CONTINUOUS       Social History:  Social History     Tobacco Use    Smoking status: Never Smoker    Smokeless tobacco: Never Used   Substance Use Topics    Alcohol use: Yes     Alcohol/week: 0.0 standard drinks     Frequency: Monthly or less     Comment: 1-2 times a yr       Family History:  Family History   Problem Relation Age of Onset    Heart Disease Mother         CHF    Cancer Father         lung    Heart Disease Brother         premature cad    Cancer Brother         throat    Heart Disease Sister     Colon Cancer Brother     Breast Cancer Sister     Breast Cancer Sister          The patient was counseled at length about the risks of paul Covid-19 in the curly-operative and post-operative states including the recovery window of their procedure. The patient was made aware that paul Covid-19 after a surgical procedure may worsen their prognosis for recovering from the virus and lend to a higher morbidity and or mortality risk. The patient was given the options of postponing their procedure. All of the risks, benefits, and alternatives were discussed. The patient does  wish to proceed with the procedure.     Review of Systems:      Constitutional: negative fever, negative chills, negative weight loss  Eyes:   negative visual changes  ENT:   negative sore throat, tongue or lip swelling  Respiratory:  negative cough, negative dyspnea  Cards:  negative for chest pain, palpitations, lower extremity edema  GI:   See HPI  :  negative for frequency, dysuria  Integument:  negative for rash and pruritus  Heme:  negative for easy bruising and gum/nose bleeding  Musculoskel: negative for myalgias,  back pain and muscle weakness  Neuro: negative for headaches, dizziness, vertigo  Psych:  negative for feelings of anxiety, depression       Objective:     Patient Vitals for the past 8 hrs:   BP Temp Pulse Resp SpO2 Height Weight   05/12/21 1348 (!) 146/82 97.3 °F (36.3 °C) 92 17 97 % 5' 4\" (1.626 m) 76.5 kg (168 lb 9 oz)     No intake/output data recorded. No intake/output data recorded. EXAM:     NEURO-a&o   HEENT-wnl   LUNGS-clear    COR-regular rate and rhythym     ABD-soft , no tenderness, no rebound, good bs     EXT-no edema     Data Review     No results for input(s): WBC, HGB, HCT, PLT, HGBEXT, HCTEXT, PLTEXT in the last 72 hours. No results for input(s): NA, K, CL, CO2, BUN, CREA, GLU, PHOS, CA in the last 72 hours. No results for input(s): AP, TBIL, TP, ALB, GLOB, GGT, AML, LPSE in the last 72 hours. No lab exists for component: SGOT, GPT, AMYP, HLPSE  No results for input(s): INR, PTP, APTT, INREXT in the last 72 hours.        Assessment:     · FHX of colon cancer     Patient Active Problem List   Diagnosis Code    Benign hypertension I10    Diabetes mellitus type 2, uncontrolled ([facility] [address] E11.65    Hyperlipidemia LDL goal <70 E78.5    Pap smear for cervical cancer screening Z12.4    H/O colonoscopy with polypectomy Z98.890, Z86.010    Gastroesophageal reflux disease K21.9           Plan:   ·   · Endoscopic procedure with sedation     Signed By: Ruel Mann MD     5/12/2021  2:34 PM

## 2021-05-12 NOTE — DISCHARGE INSTRUCTIONS
[facility]    COLON DISCHARGE INSTRUCTIONS    Alla Mccarthy  [id number]  1955    Discomfort:  Redness at IV site- apply warm compress to area; if redness or soreness persist- contact your physician  There may be a slight amount of blood passed from the rectum  Gaseous discomfort- walking, belching will help relieve any discomfort  You may not operate a vehicle for 12 hours  You may not engage in an occupation involving machinery or appliances for rest of today  You may not drink alcoholic beverages for at least 12 hours  Avoid making any critical decisions for at least 24 hour  DIET:  You may resume your regular diet - however -  remember your colon is empty and a heavy meal will produce gas. Avoid these foods:  vegetables, fried / greasy foods, carbonated drinks     ACTIVITY:  You may  resume your normal daily activities it is recommended that you spend the remainder of the day resting -  avoid any strenuous activity. CALL M.D. ANY SIGN OF:   Increasing pain, nausea, vomiting  Abdominal distension (swelling)  New increased bleeding (oral or rectal)  Fever (chills)  Pain in chest area  Bloody discharge from nose or mouth  Shortness of breath      Follow-up Instructions:   Call Dr. Kathrine Severance for any questions or problems at [phone]   Repeat colonoscopy in 5 years          ENDOSCOPY FINDINGS:   Your colonoscopy was normal.  Telephone # [phone]      Signed By: Kathrine Severance, MD     5/12/2021  2:59 PM       DISCHARGE SUMMARY from Nurse    The following personal items collected during your admission are returned to you:   Dental Appliance: Dental Appliances: None  Vision: Visual Aid: Glasses  Hearing Aid:    Jewelry:    Clothing:    Other Valuables:    Valuables sent to safe:        Learning About Coronavirus (COVID-19)  Coronavirus (COVID-19): Overview  What is coronavirus (DRGRV-16)?   The coronavirus disease (COVID-19) is caused by a virus. It is an illness that was first found in [place], [place], in December 2019. It has since spread worldwide. The virus can cause fever, cough, and trouble breathing. In severe cases, it can cause pneumonia and make it hard to breathe without help. It can cause death. Coronaviruses are a large group of viruses. They cause the common cold. They also cause more serious illnesses like Middle East respiratory syndrome (MERS) and severe acute respiratory syndrome (SARS). COVID-19 is caused by a novel coronavirus. That means it's a new type that has not been seen in people before. This virus spreads person-to-person through droplets from coughing and sneezing. It can also spread when you are close to someone who is infected. And it can spread when you touch something that has the virus on it, such as a doorknob or a tabletop. What can you do to protect yourself from coronavirus (COVID-19)? The best way to protect yourself from getting sick is to:  · Avoid areas where there is an outbreak. · Avoid contact with people who may be infected. · Wash your hands often with soap or alcohol-based hand sanitizers. · Avoid crowds and try to stay at least 6 feet away from other people. · Wash your hands often, especially after you cough or sneeze. Use soap and water, and scrub for at least 20 seconds. If soap and water aren't available, use an alcohol-based hand . · Avoid touching your mouth, nose, and eyes. What can you do to avoid spreading the virus to others? To help avoid spreading the virus to others:  · Cover your mouth with a tissue when you cough or sneeze. Then throw the tissue in the trash. · Use a disinfectant to clean things that you touch often. · Stay home if you are sick or have been exposed to the virus. Don't go to school, work, or public areas. And don't use public transportation. · If you are sick:  ? Leave your home only if you need to get medical care.  But call the doctor's office first so they know you're coming. And wear a face mask, if you have one.  ? If you have a face mask, wear it whenever you're around other people. It can help stop the spread of the virus when you cough or sneeze. ? Clean and disinfect your home every day. Use household  and disinfectant wipes or sprays. Take special care to clean things that you grab with your hands. These include doorknobs, remote controls, phones, and handles on your refrigerator and microwave. And don't forget countertops, tabletops, bathrooms, and computer keyboards. When to call for help  Call 911 anytime you think you may need emergency care. For example, call if:  · You have severe trouble breathing. (You can't talk at all.)  · You have constant chest pain or pressure. · You are severely dizzy or lightheaded. · You are confused or can't think clearly. · Your face and lips have a blue color. · You pass out (lose consciousness) or are very hard to wake up. Call your doctor now if you develop symptoms such as:  · Shortness of breath. · Fever. · Cough. If you need to get care, call ahead to the doctor's office for instructions before you go. Make sure you wear a face mask, if you have one, to prevent exposing other people to the virus. Where can you get the latest information? The following health organizations are tracking and studying this virus. Their websites contain the most up-to-date information. Goran Mazariegos also learn what to do if you think you may have been exposed to the virus. · U.S. Centers for Disease Control and Prevention (CDC): The CDC provides updated news about the disease and travel advice. The website also tells you how to prevent the spread of infection. www.cdc.gov  · World Health Organization [facility]): WHO offers information about the virus outbreaks. WHO also has travel advice. www.who.int  Current as of: April 1, 2020               Content Version: 12.4  © 5881-8894 Healthwise, Incorporated.    Care instructions adapted under license by your healthcare professional. If you have questions about a medical condition or this instruction, always ask your healthcare professional. [place] [address] any warranty or liability for your use of this information.

## 2021-05-12 NOTE — ANESTHESIA PREPROCEDURE EVALUATION
Relevant Problems   CARDIOVASCULAR   (+) Benign hypertension      GASTROINTESTINAL   (+) Gastroesophageal reflux disease       Anesthetic History   No history of anesthetic complications            Review of Systems / Medical History  Patient summary reviewed, nursing notes reviewed and pertinent labs reviewed    Pulmonary  Within defined limits                 Neuro/Psych   Within defined limits           Cardiovascular    Hypertension                   GI/Hepatic/Renal     GERD           Endo/Other    Diabetes: type 2         Other Findings              Physical Exam    Airway  Mallampati: II  TM Distance: 4 - 6 cm  Neck ROM: normal range of motion   Mouth opening: Normal     Cardiovascular  Regular rate and rhythm,  S1 and S2 normal,  no murmur, click, rub, or gallop             Dental  No notable dental hx       Pulmonary  Breath sounds clear to auscultation               Abdominal  GI exam deferred       Other Findings            Anesthetic Plan    ASA: 2  Anesthesia type: MAC          Induction: Intravenous  Anesthetic plan and risks discussed with: Patient

## 2021-05-12 NOTE — PROCEDURES
[address], [address] [place], [place] [address]      Colonoscopy Operative Report    Mayi Sandoval  [id number]  1955      Procedure Type:   Colonoscopy --diagnostic     Indications:    Family history of coloretal cancer (screening only)       Pre-operative Diagnosis: see indication above    Post-operative Diagnosis:  See findings below    :  Lesli Turcios MD    Surgical Assistant: Endoscopy Technician-1: Isac Tuttle  Endoscopy RN-1: Mickey Welsh RN    Implants:  None    Referring Provider: Rasheed Rivera MD      Sedation:  MAC anesthesia Propofol      Procedure Details:  After informed consent was obtained with all risks and benefits of procedure explained and preoperative exam completed, the patient was taken to the endoscopy suite and placed in the left lateral decubitus position. Upon sequential sedation as per above, a digital rectal exam was performed demonstrating internal hemorrhoids. The Olympus videocolonoscope  was inserted in the rectum and carefully advanced to the cecum, which was identified by the ileocecal valve and appendiceal orifice. The cecum was identified by the ileocecal valve and appendiceal orifice. The quality of preparation was good. The colonoscope was slowly withdrawn with careful evaluation between folds. Retroflexion in the rectum was completed . Findings:   Rectum: normal  Sigmoid: normal  Descending Colon: normal  Transverse Colon: normal  Ascending Colon: normal  Cecum: normal    Specimen Removed:  none    Complications: None. EBL:  None. Impression:    normal exam     Recommendations: --Repeat colonoscopy in 5 years.         Signed By: Lesli Turcios MD     5/12/2021  2:57 PM

## 2021-05-12 NOTE — ANESTHESIA POSTPROCEDURE EVALUATION
Post-Anesthesia Evaluation and Assessment    Patient: Max Irene MRN: [id number]  SSN: [id number]    YOB: 1955  Age: 73 y.o. Sex: female      I have evaluated the patient and they are stable and ready for discharge from the PACU. Cardiovascular Function/Vital Signs  Visit Vitals  /78   Pulse 95   Temp 36.3 °C (97.3 °F)   Resp 12   Ht 5' 4\" (1.626 m)   Wt 76.5 kg (168 lb 9 oz)   SpO2 95%   Breastfeeding No   BMI 28.93 kg/m²       Patient is status post MAC anesthesia for Procedure(s):  COLONOSCOPY   :-.    Nausea/Vomiting: None    Postoperative hydration reviewed and adequate. Pain:  Pain Scale 1: Numeric (0 - 10) (05/12/21 1345)  Pain Intensity 1: 0 (05/12/21 1345)   Managed    Neurological Status: At baseline    Mental Status, Level of Consciousness: Alert and  oriented to person, place, and time    Pulmonary Status:   O2 Device: None (05/12/21 1455)   Adequate oxygenation and airway patent    Complications related to anesthesia: None    Post-anesthesia assessment completed. No concerns    Signed By: Caren Barker MD     May 12, 2021              Procedure(s):  COLONOSCOPY   :-.    MAC    <BSHSIANPOST>    INITIAL Post-op Vital signs:   Vitals Value Taken Time   BP 93/66 05/12/21 1457   Temp     Pulse 92 05/12/21 1500   Resp 17 05/12/21 1500   SpO2 94 % 05/12/21 1500   Vitals shown include unvalidated device data.

## 2021-05-17 DIAGNOSIS — K21.9 GASTROESOPHAGEAL REFLUX DISEASE, UNSPECIFIED WHETHER ESOPHAGITIS PRESENT: ICD-10-CM

## 2021-05-18 RX ORDER — OMEPRAZOLE 20 MG/1
CAPSULE, DELAYED RELEASE ORAL
Qty: 90 CAP | Refills: 3 | Status: SHIPPED | OUTPATIENT
Start: 2021-05-18 | End: 2021-10-07 | Stop reason: SDUPTHER

## 2021-06-07 ENCOUNTER — TELEPHONE (OUTPATIENT)
Dept: PRIMARY CARE CLINIC | Age: 66
End: 2021-06-07

## 2021-06-07 NOTE — TELEPHONE ENCOUNTER
[address] [facility] [address] called to inquire about an order for diabetic supplies and other items. Best call back number:  [phone]    When calling please include the patient number to be directed to the correct person.  [phone]

## 2021-07-06 ENCOUNTER — TELEPHONE (OUTPATIENT)
Dept: PRIMARY CARE CLINIC | Age: 66
End: 2021-07-06

## 2021-07-06 DIAGNOSIS — E11.65 UNCONTROLLED TYPE 2 DIABETES MELLITUS WITH HYPERGLYCEMIA (HCC): Primary | ICD-10-CM

## 2021-07-06 RX ORDER — FLASH GLUCOSE SCANNING READER
EACH MISCELLANEOUS
Qty: 2 EACH | Refills: 3 | Status: SHIPPED | OUTPATIENT
Start: 2021-07-06 | End: 2021-10-07 | Stop reason: SDUPTHER

## 2021-07-06 RX ORDER — FLASH GLUCOSE SENSOR
KIT MISCELLANEOUS
Qty: 1 KIT | Refills: 1 | Status: SHIPPED | OUTPATIENT
Start: 2021-07-06 | End: 2021-10-07 | Stop reason: SDUPTHER

## 2021-07-06 NOTE — TELEPHONE ENCOUNTER
Patient called and stated that the pharmacy has not heard anything about the [facility] and the whole kit so the patient would like to change the order over to the [facility] [employer].

## 2021-07-06 NOTE — TELEPHONE ENCOUNTER
PCP: Misael Ashford MD    Last appt: 5/11/2021  Future Appointments   Date Time Provider Ruy Stevenson   3/25/2022  9:00 AM [place] MD VALENTINE Lockwood AMB       Requested Prescriptions      No prescriptions requested or ordered in this encounter         Other Comments:

## 2021-10-07 ENCOUNTER — OFFICE VISIT (OUTPATIENT)
Dept: PRIMARY CARE CLINIC | Age: 66
End: 2021-10-07
Payer: MEDICARE

## 2021-10-07 VITALS
WEIGHT: 172.4 LBS | HEIGHT: 64 IN | OXYGEN SATURATION: 99 % | BODY MASS INDEX: 29.43 KG/M2 | DIASTOLIC BLOOD PRESSURE: 75 MMHG | HEART RATE: 96 BPM | TEMPERATURE: 98.4 F | RESPIRATION RATE: 16 BRPM | SYSTOLIC BLOOD PRESSURE: 128 MMHG

## 2021-10-07 DIAGNOSIS — Z23 NEEDS FLU SHOT: ICD-10-CM

## 2021-10-07 DIAGNOSIS — E11.65 UNCONTROLLED TYPE 2 DIABETES MELLITUS WITH HYPERGLYCEMIA (HCC): Primary | ICD-10-CM

## 2021-10-07 DIAGNOSIS — Z78.0 POSTMENOPAUSAL: ICD-10-CM

## 2021-10-07 DIAGNOSIS — K21.9 GASTROESOPHAGEAL REFLUX DISEASE, UNSPECIFIED WHETHER ESOPHAGITIS PRESENT: ICD-10-CM

## 2021-10-07 DIAGNOSIS — I10 BENIGN HYPERTENSION: ICD-10-CM

## 2021-10-07 DIAGNOSIS — E78.5 HYPERLIPIDEMIA LDL GOAL <70: ICD-10-CM

## 2021-10-07 LAB — HBA1C MFR BLD HPLC: 9.7 %

## 2021-10-07 PROCEDURE — G8427 DOCREV CUR MEDS BY ELIG CLIN: HCPCS | Performed by: FAMILY MEDICINE

## 2021-10-07 PROCEDURE — 3046F HEMOGLOBIN A1C LEVEL >9.0%: CPT | Performed by: FAMILY MEDICINE

## 2021-10-07 PROCEDURE — 2022F DILAT RTA XM EVC RTNOPTHY: CPT | Performed by: FAMILY MEDICINE

## 2021-10-07 PROCEDURE — G8754 DIAS BP LESS 90: HCPCS | Performed by: FAMILY MEDICINE

## 2021-10-07 PROCEDURE — G8419 CALC BMI OUT NRM PARAM NOF/U: HCPCS | Performed by: FAMILY MEDICINE

## 2021-10-07 PROCEDURE — 1101F PT FALLS ASSESS-DOCD LE1/YR: CPT | Performed by: FAMILY MEDICINE

## 2021-10-07 PROCEDURE — G8400 PT W/DXA NO RESULTS DOC: HCPCS | Performed by: FAMILY MEDICINE

## 2021-10-07 PROCEDURE — 90694 VACC AIIV4 NO PRSRV 0.5ML IM: CPT | Performed by: FAMILY MEDICINE

## 2021-10-07 PROCEDURE — G9899 SCRN MAM PERF RSLTS DOC: HCPCS | Performed by: FAMILY MEDICINE

## 2021-10-07 PROCEDURE — 99214 OFFICE O/P EST MOD 30 MIN: CPT | Performed by: FAMILY MEDICINE

## 2021-10-07 PROCEDURE — G8510 SCR DEP NEG, NO PLAN REQD: HCPCS | Performed by: FAMILY MEDICINE

## 2021-10-07 PROCEDURE — G8536 NO DOC ELDER MAL SCRN: HCPCS | Performed by: FAMILY MEDICINE

## 2021-10-07 PROCEDURE — G0008 ADMIN INFLUENZA VIRUS VAC: HCPCS | Performed by: FAMILY MEDICINE

## 2021-10-07 PROCEDURE — 83036 HEMOGLOBIN GLYCOSYLATED A1C: CPT | Performed by: FAMILY MEDICINE

## 2021-10-07 PROCEDURE — 1090F PRES/ABSN URINE INCON ASSESS: CPT | Performed by: FAMILY MEDICINE

## 2021-10-07 PROCEDURE — G8752 SYS BP LESS 140: HCPCS | Performed by: FAMILY MEDICINE

## 2021-10-07 PROCEDURE — 3017F COLORECTAL CA SCREEN DOC REV: CPT | Performed by: FAMILY MEDICINE

## 2021-10-07 RX ORDER — FLASH GLUCOSE SCANNING READER
EACH MISCELLANEOUS
Qty: 2 EACH | Refills: 3 | Status: SHIPPED | OUTPATIENT
Start: 2021-10-07 | End: 2021-12-01 | Stop reason: SDUPTHER

## 2021-10-07 RX ORDER — PEN NEEDLE, DIABETIC 30 GX3/16"
NEEDLE, DISPOSABLE MISCELLANEOUS
Qty: 1 EACH | Refills: 11 | Status: SHIPPED | OUTPATIENT
Start: 2021-10-07

## 2021-10-07 RX ORDER — FLASH GLUCOSE SENSOR
KIT MISCELLANEOUS
Qty: 1 KIT | Refills: 1 | Status: SHIPPED | OUTPATIENT
Start: 2021-10-07 | End: 2021-11-12 | Stop reason: SDUPTHER

## 2021-10-07 RX ORDER — INSULIN GLARGINE 100 [IU]/ML
10 INJECTION, SOLUTION SUBCUTANEOUS
Qty: 3 ADJUSTABLE DOSE PRE-FILLED PEN SYRINGE | Refills: 2 | Status: SHIPPED | OUTPATIENT
Start: 2021-10-07 | End: 2022-03-01

## 2021-10-07 RX ORDER — OMEPRAZOLE 20 MG/1
CAPSULE, DELAYED RELEASE ORAL
Qty: 90 CAPSULE | Refills: 1 | Status: SHIPPED | OUTPATIENT
Start: 2021-10-07 | End: 2022-05-16 | Stop reason: SDUPTHER

## 2021-10-07 NOTE — PROGRESS NOTES
Subjective:     Chief Complaint   Patient presents with    Follow-up     diabetes- not taking blood sugar in over 8 months    Abdominal Pain     nausea and vomiting- started last month-         She  is a 73y.o. year old female medical hx is significant for HTN, DM-2, HLD is here for follow up.      She reports that she felt nauseous last month but she is fine now.      DM-2: She is  on Trulicity, metformin and Glipizide. Trulicity was increased to 3 mg in last visit. Last A1c was 7.9     HTN: BP has been well controled with prinzide and amlodipine 5 mg.      HLD:  She has been taking Crestor 20 mg . Last LDL was 84     Denies any CP, soa, cough. Lab Results   Component Value Date/Time    Hemoglobin A1c 8.2 (H) 07/05/2019 09:12 AM    Hemoglobin A1c (POC) 7.9 05/11/2021 09:39 AM       Lab Results   Component Value Date/Time    Cholesterol, total 141 09/18/2020 12:00 AM    HDL Cholesterol 44 09/18/2020 12:00 AM    LDL,Direct 149 (H) 06/04/2010 04:14 PM    LDL, calculated 84 09/18/2020 12:00 AM    LDL, calculated 144 (H) 06/15/2020 09:05 AM    VLDL, calculated 13 09/18/2020 12:00 AM    VLDL, calculated 27 06/15/2020 09:05 AM    Triglyceride 64 09/18/2020 12:00 AM     Lab Results   Component Value Date/Time    Sodium 141 09/18/2020 12:00 AM    Potassium 4.6 09/18/2020 12:00 AM    Chloride 101 09/18/2020 12:00 AM    CO2 25 09/18/2020 12:00 AM    Anion gap 10 06/04/2010 04:14 PM    Glucose 135 (H) 09/18/2020 12:00 AM    BUN 21 09/18/2020 12:00 AM    Creatinine 1.00 09/18/2020 12:00 AM    BUN/Creatinine ratio 21 09/18/2020 12:00 AM    GFR est AA 69 09/18/2020 12:00 AM    GFR est non-AA 60 09/18/2020 12:00 AM    Calcium 10.0 09/18/2020 12:00 AM    Bilirubin, total 0.4 09/18/2020 12:00 AM    Alk.  phosphatase 44 09/18/2020 12:00 AM    Protein, total 8.3 09/18/2020 12:00 AM    Albumin 4.9 (H) 09/18/2020 12:00 AM    Globulin 4.4 (H) 01/29/2010 03:45 PM    A-G Ratio 1.4 09/18/2020 12:00 AM    ALT (SGPT) 9 09/18/2020 12:00 AM    AST (SGOT) 22 09/18/2020 12:00 AM       Pertinent items are noted in HPI. Objective:     Vitals:    10/07/21 1535   BP: 128/75   Pulse: 96   Resp: 16   Temp: 98.4 °F (36.9 °C)   TempSrc: Temporal   SpO2: 99%   Weight: 172 lb 6.4 oz (78.2 kg)   Height: 5' 4\" (1.626 m)       Physical Examination: General appearance - alert, well appearing, and in no distress, oriented to person, place, and time and overweight  Mental status - alert, oriented to person, place, and time, normal mood, behavior, speech, dress, motor activity, and thought processes  Chest - clear to auscultation, no wheezes, rales or rhonchi, symmetric air entry  Heart - normal rate, regular rhythm, normal S1, S2, no murmurs, rubs, clicks or gallops    No Known Allergies   Social History     Socioeconomic History    Marital status: SINGLE     Spouse name: Not on file    Number of children: Not on file    Years of education: Not on file    Highest education level: Not on file   Occupational History    Occupation:  8-4 pm     Comment: [employer]    Tobacco Use    Smoking status: Never Smoker    Smokeless tobacco: Never Used   Vaping Use    Vaping Use: Never used   Substance and Sexual Activity    Alcohol use: Yes     Alcohol/week: 0.0 standard drinks     Comment: 1-2 times a yr    Drug use: Never    Sexual activity: Yes     Partners: Male   Social History Narrative    , two sons and two grandchildren 26 yo grandson and 10 yo grandson whom she cares for full time. Social Determinants of Health     Financial Resource Strain:     Difficulty of Paying Living Expenses:    Food Insecurity:     Worried About Running Out of Food in the Last Year:     [address] in the Last Year:    Transportation Needs:     Lack of Transportation (Medical):      Lack of Transportation (Non-Medical):    Physical Activity:     Days of Exercise per Week:     Minutes of Exercise per Session:    Stress:     Feeling of Stress :    Social Connections:     Frequency of Communication with Friends and Family:     Frequency of Social Gatherings with Friends and Family:     Attends Christian Services:     Active Member of Clubs or Organizations:     Attends Club or Organization Meetings:     Marital Status:       Family History   Problem Relation Age of Onset    Heart Disease Mother         CHF    Cancer Father         lung    Heart Disease Brother         premature cad    Cancer Brother         throat    Heart Disease Sister     Colon Cancer Brother     Breast Cancer Sister     Breast Cancer Sister       Past Surgical History:   Procedure Laterality Date    COLONOSCOPY N/A 5/12/2021    COLONOSCOPY   :- performed by Juana Cullen MD at [facility] ENDOSCOPY    HX BREAST BIOPSY Right     benign      Past Medical History:   Diagnosis Date    Diabetes ([facility] [address]     GERD (gastroesophageal reflux disease)     Hypercholesterolemia     Hypertension     Rheumatic fever       Current Outpatient Medications   Medication Sig Dispense Refill    flash glucose scanning reader (ZamzeeStyle Stephanie 14 Day [place]) misc 3-4  times/day. 2 Each 3    flash glucose sensor (FreeStyle Stephanie 14 Day Sensor) kit 3-4  times/day. 1 Kit 1    dulaglutide (Trulicity) 3 VR/0.0 mL pnij 3 mg by SubCUTAneous route every seven (7) days. 3 Box 1    amLODIPine (NORVASC) 5 mg tablet TAKE 1 TABLET DAILY FOR HIGH BLOOD PRESSURE 90 Tab 0    metFORMIN (GLUCOPHAGE) 1,000 mg tablet TAKE 1 TABLET BY MOUTH  TWICE DAILY WITH MEALS 180 Tablet 0    lisinopril-hydroCHLOROthiazide (PRINZIDE, ZESTORETIC) 20-25 mg per tablet TAKE 1 TABLET BY MOUTH  DAILY 90 Tablet 1    rosuvastatin (CRESTOR) 20 mg tablet TAKE 1 TABLET BY MOUTH AT  NIGHT 90 Tablet 1    omeprazole (PRILOSEC) 20 mg capsule TAKE 1 CAPSULE BY MOUTH  DAILY 90 Cap 3    glipiZIDE (GLUCOTROL) 5 mg tablet Take 1 Tab by mouth two (2) times a day.  180 Tab 0        Assessment/ Plan:   Diagnoses and all orders for this visit: 1. Uncontrolled type 2 diabetes mellitus with hyperglycemia (HCC)  -     AMB POC HEMOGLOBIN A1C          Last Point of Care HGB A1C  Hemoglobin A1c (POC)   Date Value Ref Range Status   10/07/2021 9.7 % Final      a1c significantly went up. -     LIPID PANEL; Future  -     METABOLIC PANEL, COMPREHENSIVE; Future  -     MICROALBUMIN, UR, RAND W/ MICROALB/CREAT RATIO; Future  -   Add   insulin glargine (LANTUS,BASAGLAR) 100 unit/mL (3 mL) inpn; 10 Units by SubCUTAneous route nightly. -     Insulin Needles, Disposable, 31 gauge x 5/16\" ndle; Once/day. -     flash glucose sensor (FreeStyle Stephanie 14 Day Sensor) kit; 3-4  times/day. -     flash glucose scanning reader (FreeStyle Stephanie 14 Day [place]) misc; 3-4  times/day. Continue Trulicity. Stop Glipizide. Work on Socialare and exercise. 2. Benign hypertension  -    BP well controlled. METABOLIC PANEL, COMPREHENSIVE; Future    3. Hyperlipidemia LDL goal <70  -     LIPID PANEL; Future  -     METABOLIC PANEL, COMPREHENSIVE; Future    4. Gastroesophageal reflux disease, unspecified whether esophagitis present  -     omeprazole (PRILOSEC) 20 mg capsule; TAKE 1 CAPSULE BY MOUTH  DAILY    5. Postmenopausal  -     DEXA BONE DENSITY STUDY AXIAL; Future    6. Needs flu shot  -     FLU (FLUAD QUAD INFLUENZA VACCINE,QUAD,ADJUVANTED)           Medication risks/benefits/costs/interactions/alternatives discussed with patient. Advised patient to call back or return to office if symptoms worsen/change/persist. If patient cannot reach us or should anything more severe/urgent arise he/she should proceed directly to the nearest emergency department. Discussed expected course/resolution/complications of diagnosis in detail with patient. Patient given a written after visit summary which includes her diagnoses, current medications and vitals. Patient expressed understanding with the diagnosis and plan.      Follow-up and Dispositions    · Return in about 6 weeks (around 11/18/2021) for Bring diabetic log book. Concha Ramos

## 2021-10-07 NOTE — PROGRESS NOTES
Chief Complaint   Patient presents with    Follow-up     diabetes- not taking blood sugar in over 8 months    Abdominal Pain     nausea and vomiting- started last month-        Health Maintenance Due   Topic    Cervical cancer screen     Eye Exam Retinal or Dilated     Bone Densitometry (Dexa) Screening     Flu Vaccine (1)    MICROALBUMIN Q1     Lipid Screen     Breast Cancer Screen Mammogram         1. Have you been to the ER, urgent care clinic since your last visit? Hospitalized since your last visit? No    2. Have you seen or consulted any other health care providers outside of the [address] [place] [address] since your last visit? Include any pap smears or colon screening.  No    Visit Vitals  /75 (BP 1 Location: Right arm, BP Patient Position: Sitting, BP Cuff Size: Adult)   Pulse 96   Temp 98.4 °F (36.9 °C) (Temporal)   Resp 16   Ht 5' 4\" (1.626 m)   Wt 172 lb 6.4 oz (78.2 kg)   SpO2 99%   BMI 29.59 kg/m²

## 2021-10-12 ENCOUNTER — TELEPHONE (OUTPATIENT)
Dept: PRIMARY CARE CLINIC | Age: 66
End: 2021-10-12

## 2021-10-12 NOTE — TELEPHONE ENCOUNTER
Patient said that the pharmacist needs to know how many times does she need to check her blood sugar.     [address]  [phone]

## 2021-10-12 NOTE — TELEPHONE ENCOUNTER
Spoke to pharmacy was given bin # [id number], [place], [id number]. That did not match up with patient current insurance information so I call patient to verify if she had switched insurance. She stated she had [facility]. I called pharmacy back and they did in fact have updated information and they stated she needed to have it ordered through [address].  Will send paperwork to [place] to request.     Raisa Monreal to patient to update informing that I will be working to see if I get get meter approved

## 2021-11-03 ENCOUNTER — TELEPHONE (OUTPATIENT)
Dept: PRIMARY CARE CLINIC | Age: 66
End: 2021-11-03

## 2021-11-03 NOTE — TELEPHONE ENCOUNTER
Called and spoke with patient she stated the insurance said she will not be able to get the free style. Patient wants to know if you can send in another meter to her pharmacy so she can check her sugars.

## 2021-11-03 NOTE — TELEPHONE ENCOUNTER
Per call from pt, her insurance will only cover free style Bobbi Rowland if she test 3 to 4 times a day. She states they will not cover anything else. Pt notes that she doesn't have anything to test with an is asking if her pens can be sent to her local pharmacy on file to be able to check her blood sugars.

## 2021-11-12 DIAGNOSIS — E11.65 UNCONTROLLED TYPE 2 DIABETES MELLITUS WITH HYPERGLYCEMIA (HCC): ICD-10-CM

## 2021-11-12 NOTE — TELEPHONE ENCOUNTER
PCP: Gianluca Rolle MD    Last appt: 10/7/2021  Future Appointments   Date Time Provider Ruy Stevenson   11/18/2021  9:00 AM Gianluca Rolle MD [facility] AMB   3/25/2022  9:00 AM Silvia Driver MD [facility] AMB       Requested Prescriptions     Pending Prescriptions Disp Refills    flash glucose sensor (FreeStyle Stephanie 14 Day Sensor) kit 1 Kit 1     Sig: 3-4  times/day.          Other Comments:

## 2021-11-13 RX ORDER — FLASH GLUCOSE SENSOR
KIT MISCELLANEOUS
Qty: 1 KIT | Refills: 1 | Status: SHIPPED | OUTPATIENT
Start: 2021-11-13 | End: 2021-12-01 | Stop reason: SDUPTHER

## 2021-12-01 ENCOUNTER — OFFICE VISIT (OUTPATIENT)
Dept: PRIMARY CARE CLINIC | Age: 66
End: 2021-12-01
Payer: MEDICARE

## 2021-12-01 VITALS
TEMPERATURE: 98.6 F | RESPIRATION RATE: 16 BRPM | WEIGHT: 172.4 LBS | SYSTOLIC BLOOD PRESSURE: 130 MMHG | HEART RATE: 92 BPM | BODY MASS INDEX: 29.43 KG/M2 | OXYGEN SATURATION: 100 % | HEIGHT: 64 IN | DIASTOLIC BLOOD PRESSURE: 76 MMHG

## 2021-12-01 DIAGNOSIS — E78.5 HYPERLIPIDEMIA LDL GOAL <70: ICD-10-CM

## 2021-12-01 DIAGNOSIS — I10 BENIGN HYPERTENSION: ICD-10-CM

## 2021-12-01 DIAGNOSIS — E11.65 UNCONTROLLED TYPE 2 DIABETES MELLITUS WITH HYPERGLYCEMIA (HCC): Primary | ICD-10-CM

## 2021-12-01 LAB — GLUCOSE POC: 283 MG/DL

## 2021-12-01 PROCEDURE — 1101F PT FALLS ASSESS-DOCD LE1/YR: CPT | Performed by: FAMILY MEDICINE

## 2021-12-01 PROCEDURE — 1090F PRES/ABSN URINE INCON ASSESS: CPT | Performed by: FAMILY MEDICINE

## 2021-12-01 PROCEDURE — 3046F HEMOGLOBIN A1C LEVEL >9.0%: CPT | Performed by: FAMILY MEDICINE

## 2021-12-01 PROCEDURE — 82962 GLUCOSE BLOOD TEST: CPT | Performed by: FAMILY MEDICINE

## 2021-12-01 PROCEDURE — 2022F DILAT RTA XM EVC RTNOPTHY: CPT | Performed by: FAMILY MEDICINE

## 2021-12-01 PROCEDURE — G8400 PT W/DXA NO RESULTS DOC: HCPCS | Performed by: FAMILY MEDICINE

## 2021-12-01 PROCEDURE — 3017F COLORECTAL CA SCREEN DOC REV: CPT | Performed by: FAMILY MEDICINE

## 2021-12-01 PROCEDURE — G9899 SCRN MAM PERF RSLTS DOC: HCPCS | Performed by: FAMILY MEDICINE

## 2021-12-01 PROCEDURE — G8754 DIAS BP LESS 90: HCPCS | Performed by: FAMILY MEDICINE

## 2021-12-01 PROCEDURE — G8427 DOCREV CUR MEDS BY ELIG CLIN: HCPCS | Performed by: FAMILY MEDICINE

## 2021-12-01 PROCEDURE — G8432 DEP SCR NOT DOC, RNG: HCPCS | Performed by: FAMILY MEDICINE

## 2021-12-01 PROCEDURE — G8536 NO DOC ELDER MAL SCRN: HCPCS | Performed by: FAMILY MEDICINE

## 2021-12-01 PROCEDURE — G8419 CALC BMI OUT NRM PARAM NOF/U: HCPCS | Performed by: FAMILY MEDICINE

## 2021-12-01 PROCEDURE — G8752 SYS BP LESS 140: HCPCS | Performed by: FAMILY MEDICINE

## 2021-12-01 PROCEDURE — 99213 OFFICE O/P EST LOW 20 MIN: CPT | Performed by: FAMILY MEDICINE

## 2021-12-01 RX ORDER — LANCETS
EACH MISCELLANEOUS
Qty: 100 EACH | Refills: 11 | Status: SHIPPED | OUTPATIENT
Start: 2021-12-01

## 2021-12-01 RX ORDER — INSULIN PUMP SYRINGE, 3 ML
EACH MISCELLANEOUS
Qty: 1 KIT | Refills: 0 | Status: SHIPPED | OUTPATIENT
Start: 2021-12-01

## 2021-12-01 RX ORDER — FLASH GLUCOSE SENSOR
KIT MISCELLANEOUS
Qty: 1 KIT | Refills: 4 | Status: SHIPPED | OUTPATIENT
Start: 2021-12-01

## 2021-12-01 RX ORDER — FLASH GLUCOSE SCANNING READER
EACH MISCELLANEOUS
Qty: 2 EACH | Refills: 3 | Status: SHIPPED | OUTPATIENT
Start: 2021-12-01

## 2021-12-01 NOTE — PROGRESS NOTES
Subjective:     Chief Complaint   Patient presents with    Diabetes Care Management     Hypertension        She  is a 73y.o. year old female medical hx is significant for HTN, DM-2, HLD is here for follow up.          DM-2: She is  on Trulicity, metformin and Glipizide and Insulin was added about 6 weeks ago with a  A1c was 9.7 but she did not start taking the Insulin since her insurance did not approve Kris Wheeler. Pharmacy asked her not to start taking insulin until she starts checking BS. Denies any urinary frequency, excessive thirst, blurry vision.      HTN: BP has been well controled with prinzide and amlodipine 5 mg.      HLD:  She has been taking Crestor 20 mg . Last LDL was 84     Denies any CP, soa, cough. Pertinent items are noted in HPI. Objective:     Vitals:    12/01/21 0934   BP: 130/76   Pulse: 92   Resp: 16   Temp: 98.6 °F (37 °C)   TempSrc: Temporal   SpO2: 100%   Weight: 172 lb 6.4 oz (78.2 kg)   Height: 5' 4\" (1.626 m)       Physical Examination: General appearance - alert, well appearing, and in no distress, oriented to person, place, and time and overweight  Mental status - alert, oriented to person, place, and time, normal mood, behavior, speech, dress, motor activity, and thought processes  Chest - clear to auscultation, no wheezes, rales or rhonchi, symmetric air entry  Heart - normal rate, regular rhythm, normal S1, S2, no murmurs, rubs, clicks or gallops    No Known Allergies   Social History     Socioeconomic History    Marital status: SINGLE   Occupational History    Occupation:  8-4 pm     Comment: [employer]    Tobacco Use    Smoking status: Never Smoker    Smokeless tobacco: Never Used   Vaping Use    Vaping Use: Never used   Substance and Sexual Activity    Alcohol use:  Yes     Alcohol/week: 0.0 standard drinks     Comment: 1-2 times a yr    Drug use: Never    Sexual activity: Yes     Partners: Male   Social History Narrative    , two sons and two grandchildren 26 yo grandson and 10 yo grandson whom she cares for full time. Family History   Problem Relation Age of Onset    Heart Disease Mother         CHF    Cancer Father         lung    Heart Disease Brother         premature cad   [facility] Cancer Brother         throat    Heart Disease Sister     Colon Cancer Brother     Breast Cancer Sister     Breast Cancer Sister       Past Surgical History:   Procedure Laterality Date    COLONOSCOPY N/A 5/12/2021    COLONOSCOPY   :- performed by Dwana Snellen, MD at [facility] ENDOSCOPY    HX BREAST BIOPSY Right     benign      Past Medical History:   Diagnosis Date    Diabetes ([facility] [address]     GERD (gastroesophageal reflux disease)     Hypercholesterolemia     Hypertension     Rheumatic fever       Current Outpatient Medications   Medication Sig Dispense Refill    flash glucose sensor (FreeStyle Stephanie 14 Day Sensor) kit 3-4  times/day. 1 Kit 1    metFORMIN (GLUCOPHAGE) 1,000 mg tablet TAKE 1 TABLET BY MOUTH  TWICE DAILY WITH MEALS 180 Tablet 1    omeprazole (PRILOSEC) 20 mg capsule TAKE 1 CAPSULE BY MOUTH  DAILY 90 Capsule 1    insulin glargine (LANTUS,BASAGLAR) 100 unit/mL (3 mL) inpn 10 Units by SubCUTAneous route nightly. 3 Adjustable Dose Pre-filled Pen Syringe 2    Insulin Needles, Disposable, 31 gauge x 5/16\" ndle Once/day. 1 Each 11    flash glucose scanning reader (FreeStyle Stephanie 14 Day [place]) misc 3-4  times/day. 2 Each 3    lisinopril-hydroCHLOROthiazide (PRINZIDE, ZESTORETIC) 20-25 mg per tablet TAKE 1 TABLET BY MOUTH  DAILY 90 Tablet 1    rosuvastatin (CRESTOR) 20 mg tablet TAKE 1 TABLET BY MOUTH AT  NIGHT 90 Tablet 1    dulaglutide (Trulicity) 3 AL/1.0 mL pnij 3 mg by SubCUTAneous route every seven (7) days. 3 Box 1    amLODIPine (NORVASC) 5 mg tablet TAKE 1 TABLET DAILY FOR HIGH BLOOD PRESSURE 90 Tab 0        Assessment/ Plan:   Diagnoses and all orders for this visit:    1.  Uncontrolled type 2 diabetes mellitus with hyperglycemia ([facility] [address]  -     flash glucose sensor (FreeStyle Stephanie 14 Day Sensor) kit; 3-4  times/day. -     flash glucose scanning reader (FreeStyle Stephanie 14 Day [place]) misc; 3-4  times/day. -     Blood-Glucose Meter monitoring kit; Check BS 3-4 times/day. -     glucose blood VI test strips (ASCENSIA AUTODISC VI, ONE TOUCH ULTRA TEST VI) strip; Check BS 3-4 times/day. -     lancets misc; Check BS 3-4 times/day. -     AMB POC GLUCOSE BLOOD, BY GLUCOSE MONITORING DEVICE- 283. Advised patient start Insulin and continue all med. She will call us if there is any issue with getting her supplies. 2. Benign hypertension      Well controlled. 3. Hyperlipidemia LDL goal <70      Well controlled. Medication risks/benefits/costs/interactions/alternatives discussed with patient. Advised patient to call back or return to office if symptoms worsen/change/persist. If patient cannot reach us or should anything more severe/urgent arise he/she should proceed directly to the nearest emergency department. Discussed expected course/resolution/complications of diagnosis in detail with patient. Patient given a written after visit summary which includes her diagnoses, current medications and vitals. Patient expressed understanding with the diagnosis and plan. Follow-up and Dispositions    · Return in about 3 months (around 3/1/2022) for Cholesterol, blood pressure, diabetes.

## 2021-12-01 NOTE — PROGRESS NOTES
Identified pt with two pt identifiers(name and ). Chief Complaint   Patient presents with    Diabetes Care Management     Hypertension        3 most recent PHQ Screens 10/7/2021   Little interest or pleasure in doing things Not at all   Feeling down, depressed, irritable, or hopeless Not at all   Total Score PHQ 2 0        Vitals:    21 0934   BP: 130/76   Pulse: 92   Resp: 16   Temp: 98.6 °F (37 °C)   TempSrc: Temporal   SpO2: 100%   Weight: 172 lb 6.4 oz (78.2 kg)   Height: 5' 4\" (1.626 m)   PainSc:   0 - No pain       Health Maintenance Due   Topic    Cervical cancer screen     Eye Exam Retinal or Dilated     Bone Densitometry (Dexa) Screening     COVID-19 Vaccine (3 - Booster for Pfizer series)    Breast Cancer Screen Mammogram        1. Have you been to the ER, urgent care clinic since your last visit? Hospitalized since your last visit? No    2. Have you seen or consulted any other health care providers outside of the [address] [place] [address] since your last visit? Include any pap smears or colon screening.  No

## 2021-12-15 ENCOUNTER — HOSPITAL ENCOUNTER (OUTPATIENT)
Dept: MAMMOGRAPHY | Age: 66
Discharge: HOME | End: 2021-12-15
Attending: FAMILY MEDICINE
Payer: MEDICARE

## 2021-12-15 DIAGNOSIS — Z78.0 POSTMENOPAUSAL: ICD-10-CM

## 2021-12-15 PROCEDURE — 77080 DXA BONE DENSITY AXIAL: CPT

## 2021-12-16 NOTE — PROGRESS NOTES
Please mail letter:    Recent bone density scan revealed osteopenia( also know as low bone mass ). Osteopenia is a medical term for bone density that is lower than normal but not as low as gets with osteoporosis. A person with osteopenia does not yet have osteoporosis but have increase chance of osteoporosis and  has greater risk of break a bone. Start Vit d 1,000 iu supplement and start taking calcium 1,200 mg daily. F/U DEXA scan in two years.

## 2022-03-19 PROBLEM — K21.9 GASTROESOPHAGEAL REFLUX DISEASE: Status: ACTIVE | Noted: 2020-06-15

## 2022-05-16 ENCOUNTER — OFFICE VISIT (OUTPATIENT)
Dept: FAMILY MEDICINE CLINIC | Age: 67
End: 2022-05-16
Payer: MEDICARE

## 2022-05-16 VITALS
WEIGHT: 168 LBS | OXYGEN SATURATION: 99 % | TEMPERATURE: 99.4 F | HEIGHT: 64 IN | BODY MASS INDEX: 28.68 KG/M2 | SYSTOLIC BLOOD PRESSURE: 141 MMHG | RESPIRATION RATE: 16 BRPM | DIASTOLIC BLOOD PRESSURE: 77 MMHG | HEART RATE: 92 BPM

## 2022-05-16 DIAGNOSIS — I10 BENIGN HYPERTENSION: ICD-10-CM

## 2022-05-16 DIAGNOSIS — N18.30 STAGE 3 CHRONIC KIDNEY DISEASE, UNSPECIFIED WHETHER STAGE 3A OR 3B CKD (HCC): ICD-10-CM

## 2022-05-16 DIAGNOSIS — E78.5 HYPERLIPIDEMIA LDL GOAL <70: ICD-10-CM

## 2022-05-16 DIAGNOSIS — E08.40 DIABETES MELLITUS DUE TO UNDERLYING CONDITION WITH DIABETIC NEUROPATHY, WITH LONG-TERM CURRENT USE OF INSULIN (HCC): Primary | ICD-10-CM

## 2022-05-16 DIAGNOSIS — K21.9 GASTROESOPHAGEAL REFLUX DISEASE, UNSPECIFIED WHETHER ESOPHAGITIS PRESENT: ICD-10-CM

## 2022-05-16 DIAGNOSIS — B37.2 INTERTRIGINOUS CANDIDIASIS: ICD-10-CM

## 2022-05-16 DIAGNOSIS — Z79.4 DIABETES MELLITUS DUE TO UNDERLYING CONDITION WITH DIABETIC NEUROPATHY, WITH LONG-TERM CURRENT USE OF INSULIN (HCC): Primary | ICD-10-CM

## 2022-05-16 LAB — HBA1C MFR BLD HPLC: 10.1 %

## 2022-05-16 PROCEDURE — G8753 SYS BP > OR = 140: HCPCS | Performed by: STUDENT IN AN ORGANIZED HEALTH CARE EDUCATION/TRAINING PROGRAM

## 2022-05-16 PROCEDURE — G9899 SCRN MAM PERF RSLTS DOC: HCPCS | Performed by: STUDENT IN AN ORGANIZED HEALTH CARE EDUCATION/TRAINING PROGRAM

## 2022-05-16 PROCEDURE — 1090F PRES/ABSN URINE INCON ASSESS: CPT | Performed by: STUDENT IN AN ORGANIZED HEALTH CARE EDUCATION/TRAINING PROGRAM

## 2022-05-16 PROCEDURE — G8399 PT W/DXA RESULTS DOCUMENT: HCPCS | Performed by: STUDENT IN AN ORGANIZED HEALTH CARE EDUCATION/TRAINING PROGRAM

## 2022-05-16 PROCEDURE — G8419 CALC BMI OUT NRM PARAM NOF/U: HCPCS | Performed by: STUDENT IN AN ORGANIZED HEALTH CARE EDUCATION/TRAINING PROGRAM

## 2022-05-16 PROCEDURE — 1101F PT FALLS ASSESS-DOCD LE1/YR: CPT | Performed by: STUDENT IN AN ORGANIZED HEALTH CARE EDUCATION/TRAINING PROGRAM

## 2022-05-16 PROCEDURE — G8427 DOCREV CUR MEDS BY ELIG CLIN: HCPCS | Performed by: STUDENT IN AN ORGANIZED HEALTH CARE EDUCATION/TRAINING PROGRAM

## 2022-05-16 PROCEDURE — 99204 OFFICE O/P NEW MOD 45 MIN: CPT | Performed by: STUDENT IN AN ORGANIZED HEALTH CARE EDUCATION/TRAINING PROGRAM

## 2022-05-16 PROCEDURE — 2022F DILAT RTA XM EVC RTNOPTHY: CPT | Performed by: STUDENT IN AN ORGANIZED HEALTH CARE EDUCATION/TRAINING PROGRAM

## 2022-05-16 PROCEDURE — 3046F HEMOGLOBIN A1C LEVEL >9.0%: CPT | Performed by: STUDENT IN AN ORGANIZED HEALTH CARE EDUCATION/TRAINING PROGRAM

## 2022-05-16 PROCEDURE — G8432 DEP SCR NOT DOC, RNG: HCPCS | Performed by: STUDENT IN AN ORGANIZED HEALTH CARE EDUCATION/TRAINING PROGRAM

## 2022-05-16 PROCEDURE — 3017F COLORECTAL CA SCREEN DOC REV: CPT | Performed by: STUDENT IN AN ORGANIZED HEALTH CARE EDUCATION/TRAINING PROGRAM

## 2022-05-16 PROCEDURE — G8754 DIAS BP LESS 90: HCPCS | Performed by: STUDENT IN AN ORGANIZED HEALTH CARE EDUCATION/TRAINING PROGRAM

## 2022-05-16 PROCEDURE — 83036 HEMOGLOBIN GLYCOSYLATED A1C: CPT | Performed by: STUDENT IN AN ORGANIZED HEALTH CARE EDUCATION/TRAINING PROGRAM

## 2022-05-16 PROCEDURE — G8536 NO DOC ELDER MAL SCRN: HCPCS | Performed by: STUDENT IN AN ORGANIZED HEALTH CARE EDUCATION/TRAINING PROGRAM

## 2022-05-16 RX ORDER — AMLODIPINE BESYLATE 5 MG/1
TABLET ORAL
Qty: 90 TABLET | Refills: 3 | Status: SHIPPED | OUTPATIENT
Start: 2022-05-16

## 2022-05-16 RX ORDER — ROSUVASTATIN CALCIUM 20 MG/1
TABLET, COATED ORAL
Qty: 90 TABLET | Refills: 3 | Status: SHIPPED | OUTPATIENT
Start: 2022-05-16

## 2022-05-16 RX ORDER — OMEPRAZOLE 20 MG/1
CAPSULE, DELAYED RELEASE ORAL
Qty: 90 CAPSULE | Refills: 1 | Status: SHIPPED | OUTPATIENT
Start: 2022-05-16 | End: 2022-09-22 | Stop reason: SDUPTHER

## 2022-05-16 RX ORDER — NYSTATIN 100000 [USP'U]/G
POWDER TOPICAL 4 TIMES DAILY
COMMUNITY
End: 2022-05-16 | Stop reason: SDUPTHER

## 2022-05-16 RX ORDER — DULAGLUTIDE 3 MG/.5ML
3 INJECTION, SOLUTION SUBCUTANEOUS
Qty: 3 EACH | Refills: 2 | Status: SHIPPED | OUTPATIENT
Start: 2022-05-16

## 2022-05-16 RX ORDER — LISINOPRIL AND HYDROCHLOROTHIAZIDE 20; 25 MG/1; MG/1
1 TABLET ORAL DAILY
Qty: 90 TABLET | Refills: 3 | Status: SHIPPED | OUTPATIENT
Start: 2022-05-16

## 2022-05-16 RX ORDER — NYSTATIN 100000 [USP'U]/G
POWDER TOPICAL 4 TIMES DAILY
Qty: 1 EACH | Refills: 2 | Status: SHIPPED | OUTPATIENT
Start: 2022-05-16

## 2022-05-16 RX ORDER — INSULIN GLARGINE 100 [IU]/ML
INJECTION, SOLUTION SUBCUTANEOUS
Qty: 3 ML | Refills: 5 | Status: SHIPPED | OUTPATIENT
Start: 2022-05-16 | End: 2022-08-02

## 2022-05-16 NOTE — PROGRESS NOTES
Name and  Verified. Previous PCP:   [facility] ([address])     Pharmacy verified    Chief Complaint   Patient presents with    New Patient    Establish Care       1. Have you been to the ER, urgent care clinic since your last visit? Hospitalized since your last visit? No    2. Have you seen or consulted any other health care providers outside of the [address] [place] [address] since your last visit? Include any pap smears or colon screening.  No    Health Maintenance Due   Topic Date Due    Eye Exam Retinal or Dilated  2019    COVID-19 Vaccine (3 - Booster for Pfizer series) 2021    Breast Cancer Screen Mammogram  10/12/2021    A1C test (Diabetic or Prediabetic)  2022    Medicare Yearly Exam  03/10/2022    Foot Exam Q1  2022    Pneumococcal 65+ years (2 - PCV) 2022

## 2022-05-16 NOTE — PROGRESS NOTES
[address] [address] [address]. [address]  [phone]    C/C: Diabetes, HTN, HLD    HPI:    Yonny Santos is a 78 y.o. BLACK/  female who presents to clinic today for evaluation of the issues listed above. Pt is new to the practice . Previous pcp: Dr. Lisbet Bynum. PCP left practice. Subjective; Patient presenting for initial office visit with me today. Diabetes  On Lantus 10 units QHS, Metformin 1g BID, and Trulicity 3mg weekly. Patient ran out of the medications for over a month. HTN:  On Prinzide and Amlodipine    HLD:  Crestor 20 mg QHS. Pt denies any  fever, chill, chest pain, SOB, MARTINEZ, PND, abdominal pain, n/v/d, HA or dizziness. Other Health Habits and social history:  Smoking history: No  Alcohol history: no  Occupation: retired. Previously working in a [address] Marital status: Patient has 2 grown children with grandkids. Other Specialists/providers:  none    Current Medications: Reviewed and updated in the chart.     Allergies- reviewed:   No Known Allergies    Past Medical History- reviewed:  Past Medical History:   Diagnosis Date    Diabetes ([facility] [address]     GERD (gastroesophageal reflux disease)     Hypercholesterolemia     Hypertension     Rheumatic fever        Family History - reviewed:  Family History   Problem Relation Age of Onset    Heart Disease Mother         CHF    Cancer Father         lung    Heart Disease Brother         premature cad    Cancer Brother         throat    Heart Disease Sister     Colon Cancer Brother     Breast Cancer Sister     Breast Cancer Sister        Social History - reviewed:  Social History     Socioeconomic History    Marital status: SINGLE     Spouse name: Not on file    Number of children: Not on file    Years of education: Not on file    Highest education level: Not on file   Occupational History    Occupation:  8-4 pm     Comment: [employer]    Tobacco Use    Smoking status: Never Smoker    Smokeless tobacco: Never Used   Vaping Use    Vaping Use: Never used   Substance and Sexual Activity    Alcohol use: Yes     Alcohol/week: 0.0 standard drinks     Comment: 1-2 times a yr    Drug use: Never    Sexual activity: Yes     Partners: Male   Other Topics Concern    Not on file   Social History Narrative    , two sons and two grandchildren 26 yo grandson and 10 yo grandson whom she cares for full time. Social Determinants of Health     Financial Resource Strain:     Difficulty of Paying Living Expenses: Not on file   Food Insecurity:     Worried About Running Out of Food in the Last Year: Not on file    Raymond of Food in the Last Year: Not on file   Transportation Needs:     Lack of Transportation (Medical): Not on file    Lack of Transportation (Non-Medical):  Not on file   Physical Activity:     Days of Exercise per Week: Not on file    Minutes of Exercise per Session: Not on file   Stress:     Feeling of Stress : Not on file   Social Connections:     Frequency of Communication with Friends and Family: Not on file    Frequency of Social Gatherings with Friends and Family: Not on file    Attends Sikh Services: Not on file    Active Member of [address] [place] [address] Plated or Organizations: Not on file    Attends Club or Organization Meetings: Not on file    Marital Status: Not on file   Intimate Partner Violence:     Fear of Current or Ex-Partner: Not on file    Emotionally Abused: Not on file    Physically Abused: Not on file    Sexually Abused: Not on file   Housing Stability:     Unable to Pay for Housing in the Last Year: Not on file    Number of [place] in the Last Year: Not on file    Unstable Housing in the Last Year: Not on file       Depression screening:  3 most recent [facility] 10/7/2021   Little interest or pleasure in doing things Not at all   Feeling down, depressed, irritable, or hopeless Not at all   Total Score PHQ 2 0         Review of systems:     A comprehensive review of systems was negative except for that written in the History of Present Illness. Visit Vitals  BP (!) 141/77 (BP 1 Location: Right arm, BP Patient Position: Sitting, BP Cuff Size: Adult)   Pulse 92   Temp 99.4 °F (37.4 °C) (Oral)   Resp 16   Ht 5' 4\" (1.626 m)   Wt 168 lb (76.2 kg)   SpO2 99%   BMI 28.84 kg/m²       General: Alert and oriented, in no acute distress. Well nourished. EYE: PERRL. Sclera and conjuctival clear. Extraocular movements intact. EARS: External normal, canals clear, tympanic membranes normal.   NOSE: Mucosa healthy without drainage or ulceration. OROPHARYNX: No suspicious lesions, normal dentition, pharynx, tongue and tonsils normal.  NECK: Supple; no masses; thyroid normal.  LUNGS: Respirations unlabored; clear to auscultation bilaterally. CARDIOVASCULAR: Regular, rate, and rhythm without murmurs, gallops or rubs. ABDOMEN: Soft; nontender; nondistended; normoactive bowel sounds; no masses or organomegaly. MUSCULOSKELETAL: FROM in all extremities     EXT: No edema. Neurovascularlly intact. Normal gait. SKIN: No rash. No suspicious lesions or moles. Neuro: Mental Status: Pt is alert and oriented to person, place, and time. Assessment/Plan       ICD-10-CM ICD-9-CM    1. Diabetes mellitus due to underlying condition with diabetic neuropathy, with long-term current use of insulin ([facility])  E08.40 249.60 AMB POC HEMOGLOBIN A1C    Z79.4 357.2 insulin glargine (Lantus Solostar U-100 Insulin) 100 unit/mL (3 mL) inpn     V58.67 dulaglutide (Trulicity) 3 EE/1.5 mL pnij      HM DIABETES FOOT EXAM   2. Benign hypertension  I10 401.1 lisinopril-hydroCHLOROthiazide (PRINZIDE, ZESTORETIC) 20-25 mg per tablet      amLODIPine (NORVASC) 5 mg tablet   3. Gastroesophageal reflux disease, unspecified whether esophagitis present  K21.9 530.81 omeprazole (PRILOSEC) 20 mg capsule   4.  Hyperlipidemia LDL goal <70  E78.5 272.4 rosuvastatin (CRESTOR) 20 mg tablet   5. Stage 3 chronic kidney disease, unspecified whether stage 3a or 3b CKD ([facility])  N18.30 585.3    6. Intertriginous candidiasis  B37.2 112.3 nystatin (MYCOSTATIN) powder     1. Diabetes mellitus due to underlying condition with diabetic neuropathy, with long-term current use of insulin ([address]  Lab Results   Component Value Date/Time    Hemoglobin A1c 8.2 (H) 07/05/2019 09:12 AM    Hemoglobin A1c (POC) 10.1 05/16/2022 11:34 AM       - Continue home monitoring. Glucose goals; Fasting (), preprandial (<140), 2-hr post-prandial (<180)    - insulin glargine (Lantus Solostar U-100 Insulin) 100 unit/mL (3 mL) inpn; ADMINISTER 10 UNITS UNDER THE SKIN EVERY NIGHT    - dulaglutide (Trulicity) 3 RW/9.9 mL pnij; 3 mg by SubCUTAneous route every seven (7) days. -  DIABETES FOOT EXAM    2. Benign hypertension  - lisinopril-hydroCHLOROthiazide (PRINZIDE, ZESTORETIC) 20-25 mg per tablet; Take 1 Tablet by mouth daily. - amLODIPine (NORVASC) 5 mg tablet; TAKE 1 TABLET DAILY FOR HIGH BLOOD PRESSURE     3. Gastroesophageal reflux disease, unspecified whether esophagitis present  - omeprazole (PRILOSEC) 20 mg capsule; TAKE 1 CAPSULE BY MOUTH  DAILY      4. Hyperlipidemia LDL goal <70  - rosuvastatin (CRESTOR) 20 mg tablet; TAKE 1 TABLET BY MOUTH AT  NIGHT      5. Stage 3 chronic kidney disease, unspecified whether stage 3a or 3b CKD ([facility])  GFR 53 (10/2021)    6. Intertriginous candidiasis    - nystatin (MYCOSTATIN) powder; Apply  to affected area four (4) times daily. Follow up: 3 months    I have discussed the diagnosis with the patient and the intended plan as seen in the above orders. The patient has received an after-visit summary and questions were answered concerning future plans. I have discussed medication side effects and warnings with the patient as well. Informed patient to return to the office if new symptoms arise.     Signed By: Sri Dillard MD     May 18, 2022

## 2022-06-02 ENCOUNTER — VIRTUAL VISIT (OUTPATIENT)
Dept: FAMILY MEDICINE CLINIC | Age: 67
End: 2022-06-02
Payer: MEDICARE

## 2022-06-02 ENCOUNTER — TELEPHONE (OUTPATIENT)
Dept: FAMILY MEDICINE CLINIC | Age: 67
End: 2022-06-02

## 2022-06-02 DIAGNOSIS — J06.0 SORE THROAT AND LARYNGITIS: Primary | ICD-10-CM

## 2022-06-02 PROCEDURE — G8756 NO BP MEASURE DOC: HCPCS | Performed by: STUDENT IN AN ORGANIZED HEALTH CARE EDUCATION/TRAINING PROGRAM

## 2022-06-02 PROCEDURE — G8427 DOCREV CUR MEDS BY ELIG CLIN: HCPCS | Performed by: STUDENT IN AN ORGANIZED HEALTH CARE EDUCATION/TRAINING PROGRAM

## 2022-06-02 PROCEDURE — 1090F PRES/ABSN URINE INCON ASSESS: CPT | Performed by: STUDENT IN AN ORGANIZED HEALTH CARE EDUCATION/TRAINING PROGRAM

## 2022-06-02 PROCEDURE — 3017F COLORECTAL CA SCREEN DOC REV: CPT | Performed by: STUDENT IN AN ORGANIZED HEALTH CARE EDUCATION/TRAINING PROGRAM

## 2022-06-02 PROCEDURE — G8399 PT W/DXA RESULTS DOCUMENT: HCPCS | Performed by: STUDENT IN AN ORGANIZED HEALTH CARE EDUCATION/TRAINING PROGRAM

## 2022-06-02 PROCEDURE — 1123F ACP DISCUSS/DSCN MKR DOCD: CPT | Performed by: STUDENT IN AN ORGANIZED HEALTH CARE EDUCATION/TRAINING PROGRAM

## 2022-06-02 PROCEDURE — G8432 DEP SCR NOT DOC, RNG: HCPCS | Performed by: STUDENT IN AN ORGANIZED HEALTH CARE EDUCATION/TRAINING PROGRAM

## 2022-06-02 PROCEDURE — 99213 OFFICE O/P EST LOW 20 MIN: CPT | Performed by: STUDENT IN AN ORGANIZED HEALTH CARE EDUCATION/TRAINING PROGRAM

## 2022-06-02 PROCEDURE — 1101F PT FALLS ASSESS-DOCD LE1/YR: CPT | Performed by: STUDENT IN AN ORGANIZED HEALTH CARE EDUCATION/TRAINING PROGRAM

## 2022-06-02 PROCEDURE — G9899 SCRN MAM PERF RSLTS DOC: HCPCS | Performed by: STUDENT IN AN ORGANIZED HEALTH CARE EDUCATION/TRAINING PROGRAM

## 2022-06-02 RX ORDER — AZITHROMYCIN 250 MG/1
TABLET, FILM COATED ORAL
Qty: 6 TABLET | Refills: 0 | Status: SHIPPED | OUTPATIENT
Start: 2022-06-02 | End: 2022-06-07

## 2022-06-02 NOTE — TELEPHONE ENCOUNTER
----- Message from Dena Franklin sent at 6/2/2022  9:59 AM EDT -----  Subject: Appointment Request    Reason for Call: Urgent (Patient Request) No Script    QUESTIONS  Type of Appointment? Established Patient  Reason for appointment request? Available appointments did not meet   patient need  Additional Information for Provider? Dr. Neel Zarate, pt has been traveling   and started getting a horribly sore throat a couple of days ago. The sore   throat eased up but now she has lost her voice badly and she is extremely   hoarse. Every day it is getting worse and she is so hoarse she can barely   talk. No appts in office until 6/13/22 (or July with her PCP) and she is   very concerned and wants to be seen sooner. Please call pt and let her   know options.  ---------------------------------------------------------------------------  --------------  CALL BACK INFO  What is the best way for the office to contact you? OK to leave message on   [facility]  Preferred Call Back Phone Number? [phone]  ---------------------------------------------------------------------------  --------------  SCRIPT ANSWERS  Relationship to Patient? Self  (Is the patient requesting to see the provider for a procedure?)? No  (Is the patient requesting to see the provider urgently  today or   tomorrow. )? Yes  Have you been diagnosed with, awaiting test results for, or told that you   are suspected of having COVID-19 (Coronavirus)? (If patient has tested   negative or was tested as a requirement for work, school, or travel and   not based on symptoms, answer no)? No  Within the past 10 days have you developed any of the following symptoms   (answer no if symptoms have been present longer than 10 days or began   more than 10 days ago)?  Fever or Chills, Cough, Shortness of breath or   difficulty breathing, Loss of taste or smell, Sore throat, Nasal   congestion, Sneezing or runny nose, Fatigue or generalized body aches   (answer no if pain is specific to a body part e.g. back pain), Diarrhea,   Headache?  Yes

## 2022-06-02 NOTE — PROGRESS NOTES
Name and  Verified. Pharmacy verified    Chief Complaint   Patient presents with    Sore Throat     x 1 week    Hoarse     Went on a road trip [place] country and her sister starting having sore throat. Then shortly after she became ill. Coughing up clear phlegm. Denies SOB    Using cough drops Halls. 1. Have you been to the ER, urgent care clinic since your last visit? Hospitalized since your last visit? No    2. Have you seen or consulted any other health care providers outside of the [address] [place] [address] since your last visit? Include any pap smears or colon screening.  No      Health Maintenance Due   Topic Date Due    Eye Exam Retinal or Dilated  2018    COVID-19 Vaccine (3 - Booster for Pfizer series) 2021    Breast Cancer Screen Mammogram  10/12/2021    Medicare Yearly Exam  03/10/2022    Pneumococcal 65+ years (2 - PCV) 2022

## 2022-06-02 NOTE — PROGRESS NOTES
[address]  [address]  [phone]    Stefanie Grossman (: 1955) is a 78 y.o. female, established patient, here for evaluation of the following chief complaint(s): sore throat    SUBJECTIVE:    Pt would like to be evaluated for the problem(s) listed above:  Sore throat: This patient has had recent onset of sore throat, associated with body aches and swollen glands x 7 days. Denies any significant cough. No fever. Denies any chest pain, sob, wheeze, sinus pain or earache. Endorse sick contact. Went on a road trip cross [facility] and her sister starting having sore throat. Then shortly after she became ill. Using cough drops to numb her throat. Has not done home covid test yet. Review of systems:      PHYSICAL EXAM:    General: alert, cooperative, no distress. Hoarse    Mental  status: mental status: alert, oriented to person, place, and time, normal mood, behavior, speech, dress, motor activity, and thought processes   Resp: resp: normal effort and no respiratory distress. No sob   Neuro: neuro: no gross deficits   Skin: skin: no discoloration or lesions of concern on visible areas   Due to this being a TeleHealth evaluation, many elements of the physical examination are unable to be assessed. ASSESSMENT/PLAN:      ICD-10-CM ICD-9-CM    1. Sore throat and laryngitis  J06.0 465.0 azithromycin (ZITHROMAX) 250 mg tablet     1. Sore throat and laryngitis    - azithromycin (ZITHROMAX) 250 mg tablet; Take 2 tablets today, then take 1 tablet daily      - Continue conservative measures; Tylenol/NSAIDs for pain, Cepacol throat lozenges for sore throat, Mucinex DM  twice daily for cough. -Patient was warned about pneumonia alarm symptoms and advised to call the office or go to the ER should they develop. -RTC prn for review if worsening or non-resolving symptoms. Return if symptoms worsen or fail to improve.     We discussed the expected course, resolution and complications of the diagnosis(es) in detail. Patient was in [place] at the time of consultation. Medication risks, benefits, costs, interactions, and alternatives were discussed as indicated. I advised her to contact the office if her condition worsens, changes or fails to improve as anticipated. She expressed understanding with the diagnosis(es) and plan. Patient understands that this encounter was a temporary measure, and the importance of further follow up and examination was emphasized. Patient verbalized understanding. Shanel Ho is being evaluated by a Virtual Visit (video visit) encounter to address concerns as mentioned above. A caregiver was present when appropriate. Due to this being a TeleHealth encounter (During RTKDS-85 public health emergency), evaluation of the following organ systems was limited: Vitals/Constitutional/EENT/Resp/CV/GI//MS/Neuro/Skin/Heme-Lymph-Imm. Pursuant to the emergency declaration under the [address] [place] [address] authority and the [employer] Act, this Virtual Visit was conducted with patient's (and/or legal guardian's) consent, to reduce the patient's risk of exposure to COVID-19 and provide necessary medical care. The patient (and/or legal guardian) has also been advised to contact this office for worsening conditions or problems, and seek emergency medical treatment and/or call 911 if deemed necessary. Patient identification was verified at the start of the visit: YES    Services were provided through a video synchronous discussion virtually to substitute for in-person clinic visit. Patient was located at home and provider was located in office or at home. An electronic signature was used to authenticate this note.     -- Jonni Dakin, MD     CPT Codes 11186-21063 for Established Patients may apply to this Telehealth Visit. POS code: 18.   Modifier GT

## 2022-08-02 DIAGNOSIS — Z79.4 DIABETES MELLITUS DUE TO UNDERLYING CONDITION WITH DIABETIC NEUROPATHY, WITH LONG-TERM CURRENT USE OF INSULIN (HCC): ICD-10-CM

## 2022-08-02 DIAGNOSIS — E08.40 DIABETES MELLITUS DUE TO UNDERLYING CONDITION WITH DIABETIC NEUROPATHY, WITH LONG-TERM CURRENT USE OF INSULIN (HCC): ICD-10-CM

## 2022-08-02 RX ORDER — INSULIN GLARGINE 100 [IU]/ML
INJECTION, SOLUTION SUBCUTANEOUS
Qty: 3 ML | Refills: 5 | Status: SHIPPED | OUTPATIENT
Start: 2022-08-02 | End: 2022-08-22 | Stop reason: SDUPTHER

## 2022-08-22 ENCOUNTER — PATIENT OUTREACH (OUTPATIENT)
Dept: CASE MANAGEMENT | Age: 67
End: 2022-08-22

## 2022-08-22 ENCOUNTER — OFFICE VISIT (OUTPATIENT)
Dept: FAMILY MEDICINE CLINIC | Age: 67
End: 2022-08-22
Payer: MEDICARE

## 2022-08-22 VITALS
RESPIRATION RATE: 16 BRPM | DIASTOLIC BLOOD PRESSURE: 86 MMHG | OXYGEN SATURATION: 97 % | HEART RATE: 92 BPM | HEIGHT: 64 IN | WEIGHT: 161 LBS | BODY MASS INDEX: 27.49 KG/M2 | TEMPERATURE: 97.4 F | SYSTOLIC BLOOD PRESSURE: 130 MMHG

## 2022-08-22 DIAGNOSIS — Z79.4 DIABETES MELLITUS DUE TO UNDERLYING CONDITION WITH DIABETIC NEUROPATHY, WITH LONG-TERM CURRENT USE OF INSULIN (HCC): ICD-10-CM

## 2022-08-22 DIAGNOSIS — Z00.00 MEDICARE ANNUAL WELLNESS VISIT, INITIAL: Primary | ICD-10-CM

## 2022-08-22 DIAGNOSIS — E08.40 DIABETES MELLITUS DUE TO UNDERLYING CONDITION WITH DIABETIC NEUROPATHY, WITH LONG-TERM CURRENT USE OF INSULIN (HCC): ICD-10-CM

## 2022-08-22 LAB — HBA1C MFR BLD HPLC: 8.7 %

## 2022-08-22 PROCEDURE — G8399 PT W/DXA RESULTS DOCUMENT: HCPCS | Performed by: STUDENT IN AN ORGANIZED HEALTH CARE EDUCATION/TRAINING PROGRAM

## 2022-08-22 PROCEDURE — G8427 DOCREV CUR MEDS BY ELIG CLIN: HCPCS | Performed by: STUDENT IN AN ORGANIZED HEALTH CARE EDUCATION/TRAINING PROGRAM

## 2022-08-22 PROCEDURE — G8752 SYS BP LESS 140: HCPCS | Performed by: STUDENT IN AN ORGANIZED HEALTH CARE EDUCATION/TRAINING PROGRAM

## 2022-08-22 PROCEDURE — 3017F COLORECTAL CA SCREEN DOC REV: CPT | Performed by: STUDENT IN AN ORGANIZED HEALTH CARE EDUCATION/TRAINING PROGRAM

## 2022-08-22 PROCEDURE — G9899 SCRN MAM PERF RSLTS DOC: HCPCS | Performed by: STUDENT IN AN ORGANIZED HEALTH CARE EDUCATION/TRAINING PROGRAM

## 2022-08-22 PROCEDURE — G8754 DIAS BP LESS 90: HCPCS | Performed by: STUDENT IN AN ORGANIZED HEALTH CARE EDUCATION/TRAINING PROGRAM

## 2022-08-22 PROCEDURE — 99213 OFFICE O/P EST LOW 20 MIN: CPT | Performed by: STUDENT IN AN ORGANIZED HEALTH CARE EDUCATION/TRAINING PROGRAM

## 2022-08-22 PROCEDURE — 1123F ACP DISCUSS/DSCN MKR DOCD: CPT | Performed by: STUDENT IN AN ORGANIZED HEALTH CARE EDUCATION/TRAINING PROGRAM

## 2022-08-22 PROCEDURE — 3052F HG A1C>EQUAL 8.0%<EQUAL 9.0%: CPT | Performed by: STUDENT IN AN ORGANIZED HEALTH CARE EDUCATION/TRAINING PROGRAM

## 2022-08-22 PROCEDURE — G8510 SCR DEP NEG, NO PLAN REQD: HCPCS | Performed by: STUDENT IN AN ORGANIZED HEALTH CARE EDUCATION/TRAINING PROGRAM

## 2022-08-22 PROCEDURE — G8417 CALC BMI ABV UP PARAM F/U: HCPCS | Performed by: STUDENT IN AN ORGANIZED HEALTH CARE EDUCATION/TRAINING PROGRAM

## 2022-08-22 PROCEDURE — G8536 NO DOC ELDER MAL SCRN: HCPCS | Performed by: STUDENT IN AN ORGANIZED HEALTH CARE EDUCATION/TRAINING PROGRAM

## 2022-08-22 PROCEDURE — 1101F PT FALLS ASSESS-DOCD LE1/YR: CPT | Performed by: STUDENT IN AN ORGANIZED HEALTH CARE EDUCATION/TRAINING PROGRAM

## 2022-08-22 PROCEDURE — 2022F DILAT RTA XM EVC RTNOPTHY: CPT | Performed by: STUDENT IN AN ORGANIZED HEALTH CARE EDUCATION/TRAINING PROGRAM

## 2022-08-22 PROCEDURE — 83036 HEMOGLOBIN GLYCOSYLATED A1C: CPT | Performed by: STUDENT IN AN ORGANIZED HEALTH CARE EDUCATION/TRAINING PROGRAM

## 2022-08-22 PROCEDURE — G0439 PPPS, SUBSEQ VISIT: HCPCS | Performed by: STUDENT IN AN ORGANIZED HEALTH CARE EDUCATION/TRAINING PROGRAM

## 2022-08-22 PROCEDURE — 1090F PRES/ABSN URINE INCON ASSESS: CPT | Performed by: STUDENT IN AN ORGANIZED HEALTH CARE EDUCATION/TRAINING PROGRAM

## 2022-08-22 RX ORDER — INSULIN GLARGINE 100 [IU]/ML
15 INJECTION, SOLUTION SUBCUTANEOUS DAILY
Qty: 15 ML | Refills: 3
Start: 2022-08-22

## 2022-08-22 NOTE — PROGRESS NOTES
[address] [facility]  [address] [facility], [facility]  [phone]    Date of visit: 8/22/2022    This is an Subsequent Medicare Annual Wellness Visit (AWV), (Performed more than 12 months after effective date of Medicare Part B enrollment and 12 months after last preventive visit, Once in a lifetime)    I have reviewed the patient's medical history in detail and updated the computerized patient record. Darrel Alcocer is a 78 y.o. female   History obtained from: the patient. Did Mammogram through OB/GYN . Up to date on colonoscpy    Concerns today   (Patient understands that medical problems addressed today may incur additional cost as this is a preventive visit)  -see separate notes    History     Patient Active Problem List   Diagnosis Code    Benign hypertension I10    Diabetes mellitus type 2, uncontrolled [id number]    Hyperlipidemia LDL goal <70 E78.5    Pap smear for cervical cancer screening Z12.4    H/O colonoscopy with polypectomy Z98.890, Z86.010    Gastroesophageal reflux disease K21.9    Chronic renal disease, stage III N18.30     Past Medical History:   Diagnosis Date    Diabetes ([address]     GERD (gastroesophageal reflux disease)     Hypercholesterolemia     Hypertension     Rheumatic fever       Past Surgical History:   Procedure Laterality Date    COLONOSCOPY N/A 5/12/2021    COLONOSCOPY   :- performed by Maritza Quinonez MD at [facility] ENDOSCOPY    HX BREAST BIOPSY Right     benign     No Known Allergies  Current Outpatient Medications   Medication Sig Dispense Refill    insulin glargine (Lantus Solostar U-100 Insulin) 100 unit/mL (3 mL) inpn 15 Units by SubCUTAneous route daily. 15 mL 3    lisinopril-hydroCHLOROthiazide (PRINZIDE, ZESTORETIC) 20-25 mg per tablet Take 1 Tablet by mouth daily.  90 Tablet 3    omeprazole (PRILOSEC) 20 mg capsule TAKE 1 CAPSULE BY MOUTH  DAILY 90 Capsule 1    rosuvastatin (CRESTOR) 20 mg tablet TAKE 1 TABLET BY MOUTH AT NIGHT 90 Tablet 3    dulaglutide (Trulicity) 3 TK/3.9 mL pnij 3 mg by SubCUTAneous route every seven (7) days. 3 Each 2    amLODIPine (NORVASC) 5 mg tablet TAKE 1 TABLET DAILY FOR HIGH BLOOD PRESSURE 90 Tablet 3    nystatin (MYCOSTATIN) powder Apply  to affected area four (4) times daily. 1 Each 2    flash glucose sensor (FreeStyle Tsephanie 14 Day Sensor) kit 3-4  times/day. 1 Kit 4    flash glucose scanning reader (FreeStyle Stephanie 14 Day [place]) misc 3-4  times/day. 2 Each 3    Blood-Glucose Meter monitoring kit Check BS 3-4 times/day. 1 Kit 0    glucose blood VI test strips (ASCENSIA AUTODISC VI, ONE TOUCH ULTRA TEST VI) strip Check BS 3-4 times/day. 100 Strip 5    lancets misc Check BS 3-4 times/day. 100 Each 11    metFORMIN (GLUCOPHAGE) 1,000 mg tablet TAKE 1 TABLET BY MOUTH  TWICE DAILY WITH MEALS 180 Tablet 1    Insulin Needles, Disposable, 31 gauge x 5/16\" ndle Once/day. 1 Each 6     Family History   Problem Relation Age of Onset    Heart Disease Mother         CHF    Cancer Father         lung    Heart Disease Brother         premature cad    Cancer Brother         throat    Heart Disease Sister     Colon Cancer Brother     Breast Cancer Sister     Breast Cancer Sister      Social History     Tobacco Use    Smoking status: Never    Smokeless tobacco: Never   Substance Use Topics    Alcohol use: Yes     Alcohol/week: 0.0 standard drinks     Comment: 1-2 times a yr       Specialists/Care Team   Liza Mccarthy has established care with the following healthcare providers:  Patient Care Team:  Phill Conti MD as PCP - General (Family Medicine)  [facility], Maryana Cardenas MD as PCP - [facility] Empaneled Provider  Darwin Campbell MD (Obstetrics & Gynecology)  Alcides Cline MD (Cardiovascular Disease Physician)    Depression risk factor screening        3 most recent PHQ Screens 8/22/2022   Little interest or pleasure in doing things Not at all   Feeling down, depressed, irritable, or hopeless Several days Total Score PHQ 2 1         Alcohol Risk Screen    Do you average more than 1 drink per night or more than 7 drinks a week:  No    On any one occasion in the past three months have you have had more than 3 drinks containing alcohol:  No        Functional Ability and Level of Safety:    Hearing: Hearing is good. Activities of Daily Living: The home contains: no safety equipment. Patient does total self care      Ambulation: with no difficulty       Fall Risk:  Fall Risk Assessment, last 12 mths 8/22/2022   Able to walk? Yes   Fall in past 12 months? 0   Do you feel unsteady? 0   Are you worried about falling 0   Is TUG test greater than 12 seconds? -   Is the gait abnormal? -      Abuse Screen:  Patient is not abused       Cognitive Screening    Has your family/caregiver stated any concerns about your memory: no     Normal  AAOx4    Health Maintenance Due     Health Maintenance Due   Topic Date Due    Eye Exam Retinal or Dilated  06/14/2018    COVID-19 Vaccine (3 - Booster for Pfizer series) 08/27/2021    Breast Cancer Screen Mammogram  10/12/2021    Medicare Yearly Exam  03/10/2022    Pneumococcal 65+ years (2 - PCV) 05/11/2022       Review of Systems (if indicated for problems addressed today)   See separate notes    Physical Examination     Vitals:    08/22/22 1000   BP: 130/86   Pulse: 92   Resp: 16   Temp: 97.4 °F (36.3 °C)   TempSrc: Oral   SpO2: 97%   Weight: 161 lb (73 kg)   Height: 5' 4\" (1.626 m)     Body mass index is 27.64 kg/m². No results found. Was the patient's timed Up & Go test unsteady or longer than 30 seconds? no    Discussion of Advance Directive   Discussed with [facility]. Fabio her ability to prepare and advance directive in the case that an injury or illness causes her to be unable to make health care decisions. Addressed with patient and  will refer to ACP specialist\    Assessment/Plan   Education and counseling provided:  Are appropriate based on today's review and evaluation. See orders above      ICD-10-CM ICD-9-CM    1. Medicare annual wellness visit, initial  Z00.00 V70.0 REFERRAL TO [facility] CLINICAL SPECIALIST      2. Diabetes mellitus due to underlying condition with diabetic neuropathy, with long-term current use of insulin ([facility])  E08.40 249.60 AMB POC HEMOGLOBIN A1C    Z79.4 357.2 insulin glargine (Lantus Solostar U-100 Insulin) 100 unit/mL (3 mL) inpn     V58.67           Medicare Wellness, Subsequent:  I have discussed with pt the following topics:   - Discussed with patient the cancer risk factors and recommendations  - Reviewed diet, exercise and weight control  -Immunizations appropriate for age were discussed with patient and updated   - Recommended sodium restriction   - Reviewed medications and side effects in detail    See separate notes for acute/chronic problems addressed at his visit. Follow up: Yearly for medicare wellness. RTC to clinic sooner as indicated for chronic/acute care. We discussed the expected course, resolution and complications of the diagnosis(es) in detail. Medication risks, benefits, costs, interactions, and alternatives were discussed as indicated. I advised to contact the office if his condition worsens, changes or fails to improve as anticipated. Pt expressed understanding with the diagnosis(es) and plan. Patient understands that this encounter was a temporary measure, and the importance of further follow up and examination was emphasized. Patient verbalized understanding.       Signed By: Sandra Alexander MD     August 22, 2022

## 2022-08-22 NOTE — PROGRESS NOTES
[address] [id number] SINTIA Balderrama. Jorge, [address] [place] [address]  [phone]    C/C: Diabetes    HPI:    Katie Mccann is a 78 y.o. BLACK/  female who presents to clinic today for evaluation of the issues listed above. Subjective;    Diabetes  On Lantus 10 units QHS, Metformin 1g BID, and Trulicity 3mg weekly. Patient states that she has been missing some doses as she tries to take care of her ailing brother. Pt denies any  fever, chill, chest pain, SOB, abdominal pain, n/v/d, HA or dizziness. Other Health Habits and social history:  Smoking history: No  Alcohol history: no  Occupation: retired. Previously working in a [address] [employer] Marital status: Patient has 2 grown children with grandkids. Allergies- reviewed:   No Known Allergies    Past Medical History- reviewed:  Past Medical History:   Diagnosis Date    Diabetes ([address]     GERD (gastroesophageal reflux disease)     Hypercholesterolemia     Hypertension     Rheumatic fever        Family History - reviewed:  Family History   Problem Relation Age of Onset    Heart Disease Mother         CHF    Cancer Father         lung    Heart Disease Brother         premature cad    Cancer Brother         throat    Heart Disease Sister     Colon Cancer Brother     Breast Cancer Sister     Breast Cancer Sister        Social History - reviewed:  Social History     Socioeconomic History    Marital status: SINGLE     Spouse name: Not on file    Number of children: Not on file    Years of education: Not on file    Highest education level: Not on file   Occupational History    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 Social Determinants of Health     Financial Resource Strain: Not on file   Food Insecurity: Not on file   Transportation Needs: Not on file   Physical Activity: Not on file   Stress: Not on file   Social Connections: Not on file   Intimate Partner Violence: Not on file   Housing Stability: Not on file       Depression screening:  3 most recent PHQ Screens 10/7/2021   Little interest or pleasure in doing things Not at all   Feeling down, depressed, irritable, or hopeless Not at all   Total Score PHQ 2 0         Review of systems:     A comprehensive review of systems was negative except for that written in the History of Present Illness. Visit Vitals  /86 (BP 1 Location: Right arm, BP Patient Position: Sitting, BP Cuff Size: Adult)   Pulse 92   Temp 97.4 °F (36.3 °C) (Oral)   Resp 16   Ht 5' 4\" (1.626 m)   Wt 161 lb (73 kg)   SpO2 97%   BMI 27.64 kg/m²       General: Alert and oriented, in no acute distress. Well nourished. EYE: PERRL. Sclera and conjuctival clear. Extraocular movements intact. EARS: External normal, canals clear, tympanic membranes normal.   NOSE: Mucosa healthy without drainage or ulceration. OROPHARYNX: No suspicious lesions, normal dentition, pharynx, tongue and tonsils normal.  NECK: Supple; no masses; thyroid normal.  LUNGS: Respirations unlabored; clear to auscultation bilaterally. CARDIOVASCULAR: Regular, rate, and rhythm without murmurs, gallops or rubs. ABDOMEN: Soft; nontender; nondistended; normoactive bowel sounds; no masses or organomegaly. MUSCULOSKELETAL: FROM in all extremities     EXT: No edema. Neurovascularlly intact. Normal gait. SKIN: No rash. No suspicious lesions or moles. Neuro: Mental Status: Pt is alert and oriented to person, place, and time.       Assessment/Plan ICD-10-CM ICD-9-CM    1. Diabetes mellitus due to underlying condition with diabetic neuropathy, with long-term current use of insulin ([facility])  E08.40 249.60 AMB POC HEMOGLOBIN A1C    Z79.4 357.2      V58.67         1. Diabetes mellitus due to underlying condition with diabetic neuropathy, with long-term current use of insulin ([address]  Lab Results   Component Value Date/Time    Hemoglobin A1c 8.2 (H) 07/05/2019 09:12 AM    Hemoglobin A1c (POC) 8.7 08/22/2022 09:55 AM     Will increase Lantus to 15 units. Encouraged compliance to medications    - START insulin glargine (Lantus Solostar U-100 Insulin) 100 unit/mL (3 mL) inpn; ADMINISTER 15 UNITS UNDER THE SKIN EVERY NIGHT    - dulaglutide (Trulicity) 3 RT/0.1 mL pnij; 3 mg by SubCUTAneous route every seven (7) days. - continue Metformin 1g BID      Follow up: 3 months    I have discussed the diagnosis with the patient and the intended plan as seen in the above orders. The patient has received an after-visit summary and questions were answered concerning future plans. I have discussed medication side effects and warnings with the patient as well. Informed patient to return to the office if new symptoms arise.     Signed By: Bridger Carlton MD     August 22, 2022

## 2022-08-22 NOTE — PROGRESS NOTES
Name and  Verified. Pharmacy verified     Chief Complaint   Patient presents with    Follow-up     3 months 2022 Diabetes       1. Have you been to the ER, urgent care clinic since your last visit? Hospitalized since your last visit? No    2. Have you seen or consulted any other health care providers outside of the [address] [place] [address] since your last visit? Include any pap smears or colon screening.  No        Health Maintenance Due   Topic Date Due    Eye Exam Retinal or Dilated  2018    COVID-19 Vaccine (3 - Booster for Pfizer series) 2021    Breast Cancer Screen Mammogram  10/12/2021    Medicare Yearly Exam  03/10/2022    Pneumococcal 65+ years (2 - PCV) 2022    A1C test (Diabetic or Prediabetic)  2022

## 2022-08-24 NOTE — ACP (ADVANCE CARE PLANNING)
Advance Care Planning   Ambulatory ACP Specialist Patient Outreach    Date:  8/22/2022    ACP Specialist:  Dain Thomson LPN    Outreach call to patient in follow-up to ACP Specialist referral from:    [x] PCP  [] Provider   [] Ambulatory Care Management [] Other     For:                  [x] Advance Directive Assistance              [] Complete Portable DNR order              [] Complete POST/MOST              [x] Code Status Discussion             [] Discuss Goals of Care             [] Early ACP Decision-Making              [] Other (Specify)    Date Referral Received: 8/22/22    Today's Outreach:  [x] First   [] Second  [] Third       Third outreach made by: [] Phone  [] Email / mail    [] [employer]     Intervention:  [x] Spoke with Patient   [] Left VM requesting return call      Outcome:   LPN spoke with the pt who wishes to move forward in having an ACP conversation with an ACP specialist. Pt is alert and oriented x4. Appointment scheduled for 8/30/22 at 9 am. ACP information has been e-mailed to the pt for review prior to the appointment     Next Step:   [x] ACP scheduled conversation  [] Outreach again in one week               [x] Email / Mail [address]  [] Email / Mail Advance Directive   [] Closing referral.  Routing closure to referring provider/staff and to ACP Specialist . [] Closure letter mailed to patient with invitation to contact ACP Specialist if / when ready.   Thank you for this referral.

## 2022-08-30 ENCOUNTER — DOCUMENTATION ONLY (OUTPATIENT)
Dept: CASE MANAGEMENT | Age: 67
End: 2022-08-30

## 2022-08-30 NOTE — ACP (ADVANCE CARE PLANNING)
Advance Care Planning   Ambulatory ACP Specialist Patient Outreach    Date:  8/30/2022    ACP Specialist:  Oliva Chiu RN    Outreach call to patient in follow-up to ACP Specialist referral from:    [x] PCP  [] Provider   [] Ambulatory Care Management [] Other     For:                  [x] Advance Directive Assistance              [] Complete Portable DNR order              [] Complete POST/MOST              [x] Code Status Discussion             [] Discuss Goals of Care             [] Early ACP Decision-Making              [] Other (Specify)    Date Referral Received:8/22/22    Today's Outreach:  [] First   [x] Second  [] Third       Third outreach made by: [] Phone  [] Email / mail    [] [employer]     Intervention:  [] Spoke with Patient   [x] Left VM requesting return call      Outcome:  RN attempted two calls to pt for scheduled ACP conversation. Left message for return call. Next Step:   [] ACP scheduled conversation  [x] Outreach again in one week               [] Email / Mail ACP Info Sheets  [] Email / Mail Advance Directive   [] Closing referral.  Routing closure to referring provider/staff and to ACP Specialist . [] Closure letter mailed to patient with invitation to contact ACP Specialist if / when ready.   Thank you for this referral.

## 2022-08-30 NOTE — ACP (ADVANCE CARE PLANNING)
Advance Care Planning     Advance Care Planning Clinical Specialist  Conversation Note      Date of ACP Conversation: 08/30/22    Conversation Conducted with:  Patient with Decision Making Capacity    ACP Clinical Specialist: Riley Navas RN    Health Care Decision Maker:    Current Designated Health Care Decision Maker:     Primary Decision Maker: Orlin Hutchinson - [phone]    Secondary Decision Maker: Yani Heath - [phone]      Care Preferences    Hospitalization: \"If your health worsens and it becomes clear that your chance of recovery is unlikely, what would your preference be regarding hospitalization? \"    Choice:  []  The patient wants hospitalization  [x]  The patient prefers comfort-focused treatment without hospitalization. Ventilation: \"If you were in your present state of health and suddenly became very ill and were unable to breathe on your own, what would your preference be about the use of a ventilator (breathing machine) if it were available to you? \"      If patient would desire the use of a ventilator (breathing machine), answer \"yes\", if not \"no\":  YES, if there is a chance of recovery    \"If your health worsens and it becomes clear that your chance of recovery is unlikely, what would your preference be about the use of a ventilator (breathing machine) if it were available to you? \"     Would the patient desire the use of a ventilator (breathing machine)? NO      Resuscitation  \"CPR works best to restart the heart when there is a sudden event, like a heart attack, in someone who is otherwise healthy. Unfortunately, CPR does not typically restart the heart for people who have serious health conditions or who are very sick. \"    \"In the event your heart stopped as a result of an underlying serious health condition, would you want attempts to be made to restart your heart (answer \"yes\" for attempt to resuscitate) or would you prefer a natural death (answer \"no\" for do not attempt to resuscitate)? \" no      [x] Yes  [] No   Educated Patient / Chelo Greene regarding differences between Advance Directives and portable DNR orders. Length of ACP Conversation in minutes:  30    Conversation Outcomes:  [x] ACP discussion completed  [] Existing advance directive reviewed with patient; no changes to patient's previously recorded wishes   [x] New Advance Directive completed   [] Portable Do Not Resuscitate prepared for Provider review and signature  [] POLST/POST/MOLST/MOST prepared for Provider review and signature      Follow-up plan:    [] Schedule follow-up conversation to continue planning  [] Referred individual to Provider for additional questions/concerns   [x] Advised patient/agent/surrogate to review completed ACP document and update if needed with changes in condition, patient preferences or care setting     [x] This note routed to one or more involved healthcare providers    Pt returned call and ACP conversation was completed. Pt advised that her last name has an apostrophe between the O and the n which she would like included if possible. This was included in the AMD.  RN reviewed above questions with pt and her answers are indicated. An AMD was completed and sent to pt via email through [employer]. When the document has been signed by all required signers and returned, it will be scanned to pt's chart.  Keyonna Huber RN

## 2022-09-22 ENCOUNTER — NURSE TRIAGE (OUTPATIENT)
Dept: OTHER | Facility: CLINIC | Age: 67
End: 2022-09-22

## 2022-09-22 ENCOUNTER — OFFICE VISIT (OUTPATIENT)
Dept: FAMILY MEDICINE CLINIC | Age: 67
End: 2022-09-22
Payer: MEDICARE

## 2022-09-22 VITALS
SYSTOLIC BLOOD PRESSURE: 123 MMHG | TEMPERATURE: 98.9 F | DIASTOLIC BLOOD PRESSURE: 79 MMHG | WEIGHT: 167.4 LBS | HEART RATE: 81 BPM | BODY MASS INDEX: 28.58 KG/M2 | RESPIRATION RATE: 17 BRPM | OXYGEN SATURATION: 97 % | HEIGHT: 64 IN

## 2022-09-22 DIAGNOSIS — R79.9 ABNORMAL FINDING OF BLOOD CHEMISTRY, UNSPECIFIED: ICD-10-CM
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
## 2022-09-22 DIAGNOSIS — R68.89 OTHER GENERAL SYMPTOMS AND SIGNS: ICD-10-CM

## 2022-09-22 DIAGNOSIS — K21.9 GASTROESOPHAGEAL REFLUX DISEASE, UNSPECIFIED WHETHER ESOPHAGITIS PRESENT: ICD-10-CM

## 2022-09-22 DIAGNOSIS — R55 SYNCOPE, UNSPECIFIED SYNCOPE TYPE: ICD-10-CM

## 2022-09-22 DIAGNOSIS — I10 BENIGN HYPERTENSION: ICD-10-CM

## 2022-09-22 DIAGNOSIS — E78.5 HYPERLIPIDEMIA LDL GOAL <70: ICD-10-CM

## 2022-09-22 PROBLEM — E11.9 TYPE 2 DIABETES MELLITUS (HCC): Status: ACTIVE | Noted: 2022-09-22

## 2022-09-22 PROCEDURE — 1101F PT FALLS ASSESS-DOCD LE1/YR: CPT | Performed by: FAMILY MEDICINE

## 2022-09-22 PROCEDURE — G8417 CALC BMI ABV UP PARAM F/U: HCPCS | Performed by: FAMILY MEDICINE

## 2022-09-22 PROCEDURE — 1090F PRES/ABSN URINE INCON ASSESS: CPT | Performed by: FAMILY MEDICINE

## 2022-09-22 PROCEDURE — G8754 DIAS BP LESS 90: HCPCS | Performed by: FAMILY MEDICINE

## 2022-09-22 PROCEDURE — 1123F ACP DISCUSS/DSCN MKR DOCD: CPT | Performed by: FAMILY MEDICINE

## 2022-09-22 PROCEDURE — G8427 DOCREV CUR MEDS BY ELIG CLIN: HCPCS | Performed by: FAMILY MEDICINE

## 2022-09-22 PROCEDURE — G9899 SCRN MAM PERF RSLTS DOC: HCPCS | Performed by: FAMILY MEDICINE

## 2022-09-22 PROCEDURE — G8536 NO DOC ELDER MAL SCRN: HCPCS | Performed by: FAMILY MEDICINE

## 2022-09-22 PROCEDURE — G8510 SCR DEP NEG, NO PLAN REQD: HCPCS | Performed by: FAMILY MEDICINE

## 2022-09-22 PROCEDURE — G8752 SYS BP LESS 140: HCPCS | Performed by: FAMILY MEDICINE

## 2022-09-22 PROCEDURE — 3017F COLORECTAL CA SCREEN DOC REV: CPT | Performed by: FAMILY MEDICINE

## 2022-09-22 PROCEDURE — G8399 PT W/DXA RESULTS DOCUMENT: HCPCS | Performed by: FAMILY MEDICINE

## 2022-09-22 PROCEDURE — 99214 OFFICE O/P EST MOD 30 MIN: CPT | Performed by: FAMILY MEDICINE

## 2022-09-22 NOTE — TELEPHONE ENCOUNTER
Received call from Joanne at [facility] with [employer]. Subjective: Caller states \"having problems with balance\"     Current Symptoms: is off balance. Couple of months ago when going to a store fell face down. Nothing gave out. Last week going to another store fell again. Will get lightheaded at times for few seconds. First time hit her nose. Last week hit her knee. Today feels fine. No issues with walking  But will suddenly just fall. Has had ringing in ear for 2 years. No illness    Onset:  has occurred twice in the past month    Associated Symptoms: NA    Pain Severity: denies    Temperature: denies     What has been tried:     LMP: NA Pregnant: NA    Recommended disposition: see within 3 days. Care advice provided, patient verbalizes understanding; denies any other questions or concerns; instructed to call back for any new or worsening symptoms. Patient/Caller agrees with recommended disposition; writer provided warm transfer to [facility] at [facility] for appointment scheduling    Attention Provider: Thank you for allowing me to participate in the care of your patient. The patient was connected to triage in response to information provided to the [facility]. Please do not respond through this encounter as the response is not directed to a shared pool. Reason for Disposition   Patient wants to be seen    Protocols used:  Falls and Falling-ADULT-[place]

## 2022-09-22 NOTE — PROGRESS NOTES
Chief Complaint   Patient presents with    Follow-up     Has been falling and does not know why she is falling, she also gets lightheaded       1. \"Have you been to the ER, urgent care clinic since your last visit? Hospitalized since your last visit? \" No    2. \"Have you seen or consulted any other health care providers outside of the [address] [place] [address] since your last visit? \" No     3. For patients aged 39-70: Has the patient had a colonoscopy / FIT/ Cologuard? Yes - no Care Gap present      If the patient is female:    4. For patients aged 41-77: Has the patient had a mammogram within the past 2 years? Yes - no Care Gap present      5. For patients aged 21-65: Has the patient had a pap smear?  Yes March 2022    Health Maintenance Due   Topic Date Due    Eye Exam Retinal or Dilated  06/14/2018    COVID-19 Vaccine (3 - Booster for Pfizer series) 08/27/2021    Breast Cancer Screen Mammogram  10/12/2021    Flu Vaccine (1) 08/01/2022    MICROALBUMIN Q1  10/11/2022

## 2022-09-22 NOTE — PROGRESS NOTES
HISTORY OF PRESENT ILLNESS  Rowdy Sandoval is a 78 y.o. female, who present for f/u regarding the diabetic state, and bp check,  patient has not been compliant with diabetic meds in the past and sometimes forget to take the meds,but is trying to have a diabetic diet, no Rf needed for today,   patient currently denies tingling sensation, has no polyurea and polydipsia, last a1c was not at target of 8% and today is at 9.7%, Last urine microalbumin 2021 and was abnormal, patient currently on ACEI,  averaging 130-220-, no low readings aldaniel vazquez,     stating that it did happen at [employer], not a hot day no stressed out,   did not go to ER,   didnot hit the head to the hard object, has had +++LOC, did not feel dizzy feeling safe at [place],   No ear ache, hear a humming sounds in the rt ear seen specialist was told to be nl,   Patient Lives at home, the patient living status comfortable, patient does not drink plenty of fluids, and patient does not exercise regularly, patient's vision and hearing checked each year, which are both normal, currently patient has no numbness in your feet, stating that ++ house well lit,  have  grab bars and nonskid mats inside and outside the shower or tub,      Current Outpatient Medications   Medication Sig Dispense Refill    omeprazole (PRILOSEC) 20 mg capsule TAKE 1 CAPSULE BY MOUTH  DAILY 90 Capsule 1    insulin glargine (Lantus Solostar U-100 Insulin) 100 unit/mL (3 mL) inpn 15 Units by SubCUTAneous route daily. 15 mL 3    lisinopril-hydroCHLOROthiazide (PRINZIDE, ZESTORETIC) 20-25 mg per tablet Take 1 Tablet by mouth daily. 90 Tablet 3    rosuvastatin (CRESTOR) 20 mg tablet TAKE 1 TABLET BY MOUTH AT  NIGHT 90 Tablet 3    dulaglutide (Trulicity) 3 OH/9.5 mL pnij 3 mg by SubCUTAneous route every seven (7) days.  3 Each 2    amLODIPine (NORVASC) 5 mg tablet TAKE 1 TABLET DAILY FOR HIGH BLOOD PRESSURE 90 Tablet 3    nystatin (MYCOSTATIN) powder Apply  to affected area four (4) times daily. 1 Each 2    Blood-Glucose Meter monitoring kit Check BS 3-4 times/day. 1 Kit 0    glucose blood VI test strips (ASCENSIA AUTODISC VI, ONE TOUCH ULTRA TEST VI) strip Check BS 3-4 times/day. 100 Strip 5    lancets misc Check BS 3-4 times/day. 100 Each 11    metFORMIN (GLUCOPHAGE) 1,000 mg tablet TAKE 1 TABLET BY MOUTH  TWICE DAILY WITH MEALS 180 Tablet 1    Insulin Needles, Disposable, 31 gauge x 5/16\" ndle Once/day. 1 Each 11    flash glucose sensor (FreeStyle Stephanie 14 Day Sensor) kit 3-4  times/day. (Patient not taking: Reported on 9/22/2022) 1 Kit 4    flash glucose scanning reader (FreeStyle Stephanie 14 Day Jackson) misc 3-4  times/day. (Patient not taking: Reported on 9/22/2022) 2 Each 3     No Known Allergies  Past Medical History:   Diagnosis Date    Diabetes ([address]     GERD (gastroesophageal reflux disease)     Hypercholesterolemia     Hypertension     Rheumatic fever      Past Surgical History:   Procedure Laterality Date    COLONOSCOPY N/A 5/12/2021    COLONOSCOPY   :- performed by Rosan Boast, MD at [facility] ENDOSCOPY    HX BREAST BIOPSY Right     benign     Family History   Problem Relation Age of Onset    Heart Disease Mother         CHF    Cancer Father         lung    Heart Disease Brother         premature cad    Cancer Brother         throat    Heart Disease Sister     Colon Cancer Brother     Breast Cancer Sister     Breast Cancer Sister      Social History     Tobacco Use    Smoking status: Never    Smokeless tobacco: Never   Substance Use Topics    Alcohol use:  Yes     Alcohol/week: 0.0 standard drinks     Comment: 1-2 times a yr      Lab Results   Component Value Date/Time    Hemoglobin A1c 9.7 (H) 09/22/2022 12:45 PM    Hemoglobin A1c 8.2 (H) 07/05/2019 09:12 AM    Hemoglobin A1c 8.5 (H) 07/26/2018 04:08 PM    Glucose 201 (H) 09/22/2022 12:45 PM    Glucose  12/01/2021 10:08 AM    Microalbumin/Creat ratio (mg/g creat) 14 10/11/2021 10:09 AM    Microalbumin,urine random 5.06 10/11/2021 10:09 AM    LDL,Direct 149 (H) 06/04/2010 04:14 PM    LDL, calculated 103 (H) 09/22/2022 12:45 PM    LDL, calculated 202.2 (H) 10/11/2021 10:09 AM    Creatinine 0.94 09/22/2022 12:45 PM      Lab Results   Component Value Date/Time    Cholesterol, total 176 09/22/2022 12:45 PM    HDL Cholesterol 43 09/22/2022 12:45 PM    LDL,Direct 149 (H) 06/04/2010 04:14 PM    LDL, calculated 103 (H) 09/22/2022 12:45 PM    LDL, calculated 202.2 (H) 10/11/2021 10:09 AM    Triglyceride 171 (H) 09/22/2022 12:45 PM    CHOL/HDL Ratio 6.0 (H) 10/11/2021 10:09 AM        Review of Systems   Constitutional:  Positive for malaise/fatigue. Negative for chills and fever. HENT:  Negative for congestion and nosebleeds. Eyes:  Negative for blurred vision and pain. Respiratory:  Negative for cough, shortness of breath and wheezing. Cardiovascular:  Negative for chest pain and leg swelling. Gastrointestinal:  Negative for constipation, diarrhea, nausea and vomiting. Genitourinary:  Negative for dysuria and frequency. Musculoskeletal:  Negative for joint pain and myalgias. Skin:  Negative for itching and rash. Neurological:  Negative for dizziness, loss of consciousness and headaches. Psychiatric/Behavioral:  Negative for depression. The patient is not nervous/anxious and does not have insomnia. Physical Exam  Vitals and nursing note reviewed. Constitutional:       Appearance: She is well-developed. HENT:      Head: Normocephalic and atraumatic. Eyes:      Conjunctiva/sclera: Conjunctivae normal.      Pupils: Pupils are equal, round, and reactive to light. Neck:      Thyroid: No thyromegaly. Vascular: No JVD. Cardiovascular:      Rate and Rhythm: Normal rate and regular rhythm. Heart sounds: Normal heart sounds. No murmur heard. No friction rub. No gallop. Pulmonary:      Effort: Pulmonary effort is normal. No respiratory distress. Breath sounds: Normal breath sounds. No stridor.  No wheezing or rales. Abdominal:      General: Bowel sounds are normal. There is no distension. Palpations: Abdomen is soft. There is no mass. Tenderness: There is no abdominal tenderness. Musculoskeletal:         General: No tenderness. Normal range of motion. Lymphadenopathy:      Cervical: No cervical adenopathy. Skin:     Findings: No erythema or rash. Neurological:      Mental Status: She is alert and oriented to person, place, and time. Cranial Nerves: No cranial nerve deficit. Deep Tendon Reflexes: Reflexes are normal and symmetric. Psychiatric:         Behavior: Behavior normal.       ASSESSMENT and PLAN    ICD-10-CM ICD-9-CM    1. Fall, subsequent encounter  W19. XXXD V58.89 REFERRAL TO CARDIOLOGY     E888.9 REFERRAL TO NEUROLOGY      CBC W/O DIFF      METABOLIC PANEL, COMPREHENSIVE      TSH 3RD GENERATION      T4 (THYROXINE)      LIPID PANEL      HEMOGLOBIN A1C WITH EAG      FERRITIN      VITAMIN B12 & FOLATE      CBC W/O DIFF      METABOLIC PANEL, COMPREHENSIVE      TSH 3RD GENERATION      T4 (THYROXINE)      LIPID PANEL      HEMOGLOBIN A1C WITH EAG      FERRITIN      VITAMIN B12 & FOLATE      2. Syncope, unspecified syncope type  R55 780.2 REFERRAL TO CARDIOLOGY      REFERRAL TO NEUROLOGY      CBC W/O DIFF      METABOLIC PANEL, COMPREHENSIVE      TSH 3RD GENERATION      T4 (THYROXINE)      LIPID PANEL      HEMOGLOBIN A1C WITH EAG      FERRITIN      VITAMIN B12 & FOLATE      CBC W/O DIFF      METABOLIC PANEL, COMPREHENSIVE      TSH 3RD GENERATION      T4 (THYROXINE)      LIPID PANEL      HEMOGLOBIN A1C WITH EAG      FERRITIN      VITAMIN B12 & FOLATE      3.  Hyperlipidemia LDL goal <70  E78.5 272.4 REFERRAL TO CARDIOLOGY      REFERRAL TO NEUROLOGY      CBC W/O DIFF      METABOLIC PANEL, COMPREHENSIVE      TSH 3RD GENERATION      T4 (THYROXINE)      LIPID PANEL      HEMOGLOBIN A1C WITH EAG      FERRITIN      VITAMIN B12 & FOLATE      CBC W/O DIFF      METABOLIC PANEL, COMPREHENSIVE      TSH 3RD GENERATION      T4 (THYROXINE)      LIPID PANEL      HEMOGLOBIN A1C WITH EAG      FERRITIN      VITAMIN B12 & FOLATE      4. Benign hypertension  I10 401.1 REFERRAL TO CARDIOLOGY      REFERRAL TO NEUROLOGY      CBC W/O DIFF      METABOLIC PANEL, COMPREHENSIVE      TSH 3RD GENERATION      T4 (THYROXINE)      LIPID PANEL      HEMOGLOBIN A1C WITH EAG      FERRITIN      VITAMIN B12 & FOLATE      CBC W/O DIFF      METABOLIC PANEL, COMPREHENSIVE      TSH 3RD GENERATION      T4 (THYROXINE)      LIPID PANEL      HEMOGLOBIN A1C WITH EAG      FERRITIN      VITAMIN B12 & FOLATE      5. Abnormal finding of blood chemistry, unspecified   R79.9 790.6 HEMOGLOBIN A1C WITH EAG      FERRITIN      HEMOGLOBIN A1C WITH EAG      FERRITIN      6. Other general symptoms and signs   R68.89 780.99 VITAMIN B12 & FOLATE      VITAMIN B12 & FOLATE      7.  Gastroesophageal reflux disease, unspecified whether esophagitis present  K21.9 530.81 omeprazole (PRILOSEC) 20 mg capsule      Pt was told to take meds as prescribed do not operate machinery while taking the meds, have low salt k rich, low fat low chol diet, f/u with specialist, avoid sudden movement if dizziness not better call 911 or go to ER for further care, call for any concern 24hrs 365 days, pt acknowledged understating and agreed,   Patient was told that   to follow-up with cardiologist neurologist   lab results and schedule of future lab studies reviewed with patient  reviewed diet, exercise and weight control

## 2022-09-22 NOTE — LETTER
9/30/2022    Ms. [address] [facility], [place] [address]      Dear Milka Luque:    Please find your most recent results below. Recent Results (from the past 672 hour(s))   CBC W/O DIFF    Collection Time: 09/22/22 12:45 PM   Result Value Ref Range    WBC 6.2 3.4 - 10.8 x10E3/uL    RBC 3.87 3.77 - 5.28 x10E6/uL    HGB 11.9 11.1 - 15.9 g/dL    HCT 35.2 34.0 - 46.6 %    MCV 91 79 - 97 fL    MCH 30.7 26.6 - 33.0 pg    MCHC 33.8 31.5 - 35.7 g/dL    RDW 11.9 11.7 - 15.4 %    PLATELET 569 082 - 387 G59G6/RM   METABOLIC PANEL, COMPREHENSIVE    Collection Time: 09/22/22 12:45 PM   Result Value Ref Range    Glucose 201 (H) 65 - 99 mg/dL    BUN 19 8 - 27 mg/dL    Creatinine 0.94 0.57 - 1.00 mg/dL    eGFR 67 >59 mL/min/1.73    BUN/Creatinine ratio 20 12 - 28    Sodium 135 134 - 144 mmol/L    Potassium 5.1 3.5 - 5.2 mmol/L    Chloride 95 (L) 96 - 106 mmol/L    CO2 22 20 - 29 mmol/L    Calcium 9.8 8.7 - 10.3 mg/dL    Protein, total 7.8 6.0 - 8.5 g/dL    Albumin 4.7 3.8 - 4.8 g/dL    GLOBULIN, TOTAL 3.1 1.5 - 4.5 g/dL    A-G Ratio 1.5 1.2 - 2.2    Bilirubin, total 0.4 0.0 - 1.2 mg/dL    Alk.  phosphatase 47 44 - 121 IU/L    AST (SGOT) 23 0 - 40 IU/L    ALT (SGPT) 12 0 - 32 IU/L   TSH 3RD GENERATION    Collection Time: 09/22/22 12:45 PM   Result Value Ref Range    TSH 1.120 0.450 - 4.500 uIU/mL   T4 (THYROXINE)    Collection Time: 09/22/22 12:45 PM   Result Value Ref Range    T4, Total 7.8 4.5 - 12.0 ug/dL   LIPID PANEL    Collection Time: 09/22/22 12:45 PM   Result Value Ref Range    Cholesterol, total 176 100 - 199 mg/dL    Triglyceride 171 (H) 0 - 149 mg/dL    HDL Cholesterol 43 >39 mg/dL    VLDL, calculated 30 5 - 40 mg/dL    LDL, calculated 103 (H) 0 - 99 mg/dL   HEMOGLOBIN A1C WITH EAG    Collection Time: 09/22/22 12:45 PM   Result Value Ref Range    Hemoglobin A1c 9.7 (H) 4.8 - 5.6 %    Estimated average glucose 232 mg/dL   FERRITIN    Collection Time: 09/22/22 12:45 PM   Result Value Ref Range    Ferritin 391 (H) 15 - 150 ng/mL   VITAMIN B12 & FOLATE    Collection Time: 09/22/22 12:45 PM   Result Value Ref Range    Vitamin B12 693 232 - 1,245 pg/mL    Folate >20.0 >3.0 ng/mL            RECOMMENDATIONS:  Normal test results except for sugar level into the UNcontrolled diabetic state please be compliant with the following steps for improving diabetic care and outcome for further care to prevent further devastating complications of a uncontrolled diabetic state: increase Diabetic Education by more readings, have a great diabetic diet , low cholesterol diet, weight control and daily exercise 20- 30 min most days of the week, home glucose monitoring if possible, and daily foot care and yearly foot  Doctor  and  annual eye examinations at Ophthalmologist,  will cont with your average sugar reading check every 3months. Keep up the good work! Work on diet and exercise. Continue with current  diet and medications.          Please call me if you have any questions: [phone]    Sincerely,      Yves Doyle MD

## 2022-09-23 LAB
ALBUMIN SERPL-MCNC: 4.7 G/DL (ref 3.8–4.8)
ALBUMIN/GLOB SERPL: 1.5 {RATIO} (ref 1.2–2.2)
ALP SERPL-CCNC: 47 IU/L (ref 44–121)
ALT SERPL-CCNC: 12 IU/L (ref 0–32)
AST SERPL-CCNC: 23 IU/L (ref 0–40)
BILIRUB SERPL-MCNC: 0.4 MG/DL (ref 0–1.2)
BUN SERPL-MCNC: 19 MG/DL (ref 8–27)
BUN/CREAT SERPL: 20 (ref 12–28)
CALCIUM SERPL-MCNC: 9.8 MG/DL (ref 8.7–10.3)
CHLORIDE SERPL-SCNC: 95 MMOL/L (ref 96–106)
CHOLEST SERPL-MCNC: 176 MG/DL (ref 100–199)
CO2 SERPL-SCNC: 22 MMOL/L (ref 20–29)
CREAT SERPL-MCNC: 0.94 MG/DL (ref 0.57–1)
EGFR: 67 ML/MIN/1.73
ERYTHROCYTE [DISTWIDTH] IN BLOOD BY AUTOMATED COUNT: 11.9 % (ref 11.7–15.4)
EST. AVERAGE GLUCOSE BLD GHB EST-MCNC: 232 MG/DL
FERRITIN SERPL-MCNC: 391 NG/ML (ref 15–150)
FOLATE SERPL-MCNC: >20 NG/ML
GLOBULIN SER CALC-MCNC: 3.1 G/DL (ref 1.5–4.5)
GLUCOSE SERPL-MCNC: 201 MG/DL (ref 65–99)
HBA1C MFR BLD: 9.7 % (ref 4.8–5.6)
HCT VFR BLD AUTO: 35.2 % (ref 34–46.6)
HDLC SERPL-MCNC: 43 MG/DL
HGB BLD-MCNC: 11.9 G/DL (ref 11.1–15.9)
LDLC SERPL CALC-MCNC: 103 MG/DL (ref 0–99)
MCH RBC QN AUTO: 30.7 PG (ref 26.6–33)
MCHC RBC AUTO-ENTMCNC: 33.8 G/DL (ref 31.5–35.7)
MCV RBC AUTO: 91 FL (ref 79–97)
PLATELET # BLD AUTO: 375 X10E3/UL (ref 150–450)
POTASSIUM SERPL-SCNC: 5.1 MMOL/L (ref 3.5–5.2)
PROT SERPL-MCNC: 7.8 G/DL (ref 6–8.5)
RBC # BLD AUTO: 3.87 X10E6/UL (ref 3.77–5.28)
SODIUM SERPL-SCNC: 135 MMOL/L (ref 134–144)
T4 SERPL-MCNC: 7.8 UG/DL (ref 4.5–12)
TRIGL SERPL-MCNC: 171 MG/DL (ref 0–149)
TSH SERPL DL<=0.005 MIU/L-ACNC: 1.12 UIU/ML (ref 0.45–4.5)
VIT B12 SERPL-MCNC: 693 PG/ML (ref 232–1245)
VLDLC SERPL CALC-MCNC: 30 MG/DL (ref 5–40)
WBC # BLD AUTO: 6.2 X10E3/UL (ref 3.4–10.8)

## 2022-09-30 RX ORDER — OMEPRAZOLE 20 MG/1
CAPSULE, DELAYED RELEASE ORAL
Qty: 90 CAPSULE | Refills: 1 | Status: SHIPPED | OUTPATIENT
Start: 2022-09-30

## 2023-03-06 ENCOUNTER — OFFICE VISIT (OUTPATIENT)
Dept: FAMILY MEDICINE CLINIC | Age: 68
End: 2023-03-06
Payer: MEDICARE

## 2023-03-06 VITALS
DIASTOLIC BLOOD PRESSURE: 84 MMHG | OXYGEN SATURATION: 99 % | HEIGHT: 64 IN | SYSTOLIC BLOOD PRESSURE: 144 MMHG | HEART RATE: 82 BPM | WEIGHT: 167 LBS | BODY MASS INDEX: 28.51 KG/M2 | TEMPERATURE: 98.4 F | RESPIRATION RATE: 16 BRPM

## 2023-03-06 DIAGNOSIS — Z91.199 NONCOMPLIANCE: ICD-10-CM

## 2023-03-06 DIAGNOSIS — R42 DIZZINESS AND GIDDINESS: ICD-10-CM

## 2023-03-06 DIAGNOSIS — Z79.4 DIABETES MELLITUS DUE TO UNDERLYING CONDITION WITH DIABETIC NEUROPATHY, WITH LONG-TERM CURRENT USE OF INSULIN (HCC): Primary | ICD-10-CM

## 2023-03-06 DIAGNOSIS — E08.40 DIABETES MELLITUS DUE TO UNDERLYING CONDITION WITH DIABETIC NEUROPATHY, WITH LONG-TERM CURRENT USE OF INSULIN (HCC): Primary | ICD-10-CM

## 2023-03-06 DIAGNOSIS — N18.30 STAGE 3 CHRONIC KIDNEY DISEASE, UNSPECIFIED WHETHER STAGE 3A OR 3B CKD (HCC): ICD-10-CM

## 2023-03-06 LAB — HBA1C MFR BLD HPLC: 10.3 %

## 2023-03-06 PROCEDURE — G8432 DEP SCR NOT DOC, RNG: HCPCS | Performed by: STUDENT IN AN ORGANIZED HEALTH CARE EDUCATION/TRAINING PROGRAM

## 2023-03-06 PROCEDURE — 83036 HEMOGLOBIN GLYCOSYLATED A1C: CPT | Performed by: STUDENT IN AN ORGANIZED HEALTH CARE EDUCATION/TRAINING PROGRAM

## 2023-03-06 PROCEDURE — 1101F PT FALLS ASSESS-DOCD LE1/YR: CPT | Performed by: STUDENT IN AN ORGANIZED HEALTH CARE EDUCATION/TRAINING PROGRAM

## 2023-03-06 PROCEDURE — G8427 DOCREV CUR MEDS BY ELIG CLIN: HCPCS | Performed by: STUDENT IN AN ORGANIZED HEALTH CARE EDUCATION/TRAINING PROGRAM

## 2023-03-06 PROCEDURE — 3077F SYST BP >= 140 MM HG: CPT | Performed by: STUDENT IN AN ORGANIZED HEALTH CARE EDUCATION/TRAINING PROGRAM

## 2023-03-06 PROCEDURE — 3017F COLORECTAL CA SCREEN DOC REV: CPT | Performed by: STUDENT IN AN ORGANIZED HEALTH CARE EDUCATION/TRAINING PROGRAM

## 2023-03-06 PROCEDURE — 1090F PRES/ABSN URINE INCON ASSESS: CPT | Performed by: STUDENT IN AN ORGANIZED HEALTH CARE EDUCATION/TRAINING PROGRAM

## 2023-03-06 PROCEDURE — 1123F ACP DISCUSS/DSCN MKR DOCD: CPT | Performed by: STUDENT IN AN ORGANIZED HEALTH CARE EDUCATION/TRAINING PROGRAM

## 2023-03-06 PROCEDURE — G8536 NO DOC ELDER MAL SCRN: HCPCS | Performed by: STUDENT IN AN ORGANIZED HEALTH CARE EDUCATION/TRAINING PROGRAM

## 2023-03-06 PROCEDURE — G8417 CALC BMI ABV UP PARAM F/U: HCPCS | Performed by: STUDENT IN AN ORGANIZED HEALTH CARE EDUCATION/TRAINING PROGRAM

## 2023-03-06 PROCEDURE — 3079F DIAST BP 80-89 MM HG: CPT | Performed by: STUDENT IN AN ORGANIZED HEALTH CARE EDUCATION/TRAINING PROGRAM

## 2023-03-06 PROCEDURE — G8399 PT W/DXA RESULTS DOCUMENT: HCPCS | Performed by: STUDENT IN AN ORGANIZED HEALTH CARE EDUCATION/TRAINING PROGRAM

## 2023-03-06 PROCEDURE — 99214 OFFICE O/P EST MOD 30 MIN: CPT | Performed by: STUDENT IN AN ORGANIZED HEALTH CARE EDUCATION/TRAINING PROGRAM

## 2023-03-06 NOTE — PROGRESS NOTES
[address]  [address] [facility], [address]  [phone]    C/C: Diabetes, Falls and HTN    HPI:    Montana Moses is a 80 y.o. BLACK/  female who presents to clinic today for evaluation of the issues listed above. Subjective;    Diabetes:  Noncompliant with medications. States that she is busy taking care of her brother. Suppose to be on Lantus 15 units AM, Metformin 1g BID, and Trulicity 3mg weekly. Patient states that she has been missing some doses as she tries to take care of her ailing brother. Also admits that she has been eating a lot of candies. Falls:  States that she fell twice last year. Last fall was in 09/2022. States that she fells on and off dizziness and can see herself falling but unable to help herself. [place]o Carmichael in front of a [employer]. Never went to the hospital. Saw my colleague and was referred to neuro/cards. States that the wait time was too long (~4 months out) and she decided not to schedule the appointment. HTN:  Noncompliant with her medications. Suppose to be on Amlodipine and prinzide    Pt denies any  fever, chill, chest pain, SOB, abdominal pain, n/v/d, HA or dizziness. 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 Marital status: Patient has 2 grown children with grandkids.     Allergies- reviewed:   No Known Allergies    Past Medical History- reviewed:  Past Medical History:   Diagnosis Date    Diabetes ([address]     GERD (gastroesophageal reflux disease)     Hypercholesterolemia     Hypertension     Rheumatic fever        Family History - reviewed:  Family History   Problem Relation Age of Onset    Heart Disease Mother         CHF    Cancer Father         lung    Heart Disease Brother         premature cad    Cancer Brother throat    Heart Disease Sister     Colon Cancer Brother     Breast Cancer Sister     Breast Cancer Sister        Social History - reviewed:  Social History     Socioeconomic History    Marital status: SINGLE     Spouse name: Not on file    Number of children: Not on file    Years of education: Not on file    Highest education level: Not on file   Occupational History    Occupation:  8-4 pm     Comment: [employer]    Tobacco Use    Smoking status: Never    Smokeless tobacco: Never   Vaping Use    Vaping Use: Never used   Substance and Sexual Activity    Alcohol use: Yes     Alcohol/week: 0.0 standard drinks     Comment: 1-2 times a yr    Drug use: Never    Sexual activity: Yes     Partners: Male   Other Topics Concern    Not on file   Social History Narrative    , two sons and two grandchildren 26 yo grandson and 10 yo grandson whom she cares for full time. Social Determinants of Health     Financial Resource Strain: Not on file   Food Insecurity: Not on file   Transportation Needs: Not on file   Physical Activity: Not on file   Stress: Not on file   Social Connections: Not on file   Intimate Partner Violence: Not on file   Housing Stability: Not on file       Depression screening:  3 most recent [facility] Screens 9/22/2022   Little interest or pleasure in doing things Not at all   Feeling down, depressed, irritable, or hopeless Not at all   Total Score PHQ 2 0         Review of systems:     A comprehensive review of systems was negative except for that written in the History of Present Illness. Visit Vitals  BP (!) 144/84 (BP 1 Location: Right arm, BP Patient Position: Sitting, BP Cuff Size: Adult)   Pulse 82   Temp 98.4 °F (36.9 °C) (Oral)   Resp 16   Ht 5' 4\" (1.626 m)   Wt 167 lb (75.8 kg)   SpO2 99%   BMI 28.67 kg/m²       General: Alert and oriented, in no acute distress. Well nourished. EYE: PERRL. Sclera and conjuctival clear. Extraocular movements intact.   EARS: External normal, canals clear, tympanic membranes normal.   NOSE: Mucosa healthy without drainage or ulceration. OROPHARYNX: No suspicious lesions, normal dentition, pharynx, tongue and tonsils normal.  NECK: Supple; no masses; thyroid normal.  LUNGS: Respirations unlabored; clear to auscultation bilaterally. CARDIOVASCULAR: Regular, rate, and rhythm without murmurs, gallops or rubs. ABDOMEN: Soft; nontender; nondistended; normoactive bowel sounds; no masses or organomegaly. MUSCULOSKELETAL: FROM in all extremities     EXT: No edema. Neurovascularlly intact. Normal gait. SKIN: No rash. No suspicious lesions or moles. Neuro: Mental Status: Pt is alert and oriented to person, place, and time. Assessment/Plan       ICD-10-CM ICD-9-CM    1. Diabetes mellitus due to underlying condition with diabetic neuropathy, with long-term current use of insulin ([facility])  E08.40 249.60 AMB POC HEMOGLOBIN A1C    Z79.4 357.2 MICROALBUMIN, UR, RAND W/ MICROALB/CREAT RATIO     V58.67 MICROALBUMIN, UR, RAND W/ MICROALB/CREAT RATIO      2. Dizziness and giddiness  R42 780.4 CT HEAD WO CONT      3. Stage 3 chronic kidney disease, unspecified whether stage 3a or 3b CKD ([facility])  N18.30 585.3       4. Noncompliance  Z91.199 V15.81         1. Diabetes mellitus due to underlying condition with diabetic neuropathy, with long-term current use of insulin ([facility])  Lab Results   Component Value Date/Time    Hemoglobin A1c 9.7 (H) 09/22/2022 12:45 PM    Hemoglobin A1c (POC) 10.3 03/06/2023 11:33 AM       Encouraged compliance with medications    -  insulin glargine (Lantus Solostar U-100 Insulin) 100 unit/mL (3 mL) inpn; ADMINISTER 15 UNITS UNDER THE SKIN EVERY NIGHT    - dulaglutide (Trulicity) 3 BQ/7.2 mL pnij; 3 mg by SubCUTAneous route every seven (7) days. - continue Metformin 1g BID    - MICROALBUMIN, UR, RAND W/ MICROALB/CREAT RATIO; Future  - MICROALBUMIN, UR, RAND W/ MICROALB/CREAT RATIO    2.  Dizziness and giddiness  - CT HEAD WO CONT; Future    3. Stage 3 chronic kidney disease, unspecified whether stage 3a or 3b CKD ([facility])  Avoid nephrotoxic agents. Adequate water intake discussed with patient    4. Noncompliance  I explained to patient that it is very important to comply with our treatment plans in order to prevent further health complications. Also explained that chronic/repeated non-compliance could jeopardize of our physician-patient relationship. Pt expresses understanding. Follow up: 3 months    On this date 03/06/23 I have spent 35 minutes reviewing previous notes, test results and face to face  with the patient discussing the diagnosis and importance of compliance with the treatment plan as well as documenting on the day of the visit. I have discussed the diagnosis with the patient and the intended plan as seen in the above orders. The patient has received an after-visit summary and questions were answered concerning future plans. I have discussed medication side effects and warnings with the patient as well. Informed patient to return to the office if new symptoms arise.     Signed By: Lenore Pedraza MD     March 6, 2023

## 2023-03-06 NOTE — PROGRESS NOTES
Name and  Verified. Pharmacy verified     Chief Complaint   Patient presents with    Follow-up     2022 Diabetes      Needs order for mammogram.    1. Have you been to the ER, urgent care clinic since your last visit? Hospitalized since your last visit? No    2. Have you seen or consulted any other health care providers outside of the [address] [place] [address] since your last visit? Include any pap smears or colon screening.  No      Health Maintenance Due   Topic Date Due    Eye Exam Retinal or Dilated  2018    COVID-19 Vaccine (3 - Booster for Pfizer series) 2021    Breast Cancer Screen Mammogram  10/12/2021    Diabetic Alb to Cr ratio (uACR) test  10/11/2022    A1C test (Diabetic or Prediabetic)  2022

## 2023-03-08 LAB
ALBUMIN/CREAT UR: 27 MG/G CREAT (ref 0–29)
CREAT UR-MCNC: 87.3 MG/DL
MICROALBUMIN UR-MCNC: 23.8 UG/ML

## 2023-03-11 ENCOUNTER — HOSPITAL ENCOUNTER (OUTPATIENT)
Dept: CT IMAGING | Age: 68
Discharge: HOME | End: 2023-03-11
Attending: STUDENT IN AN ORGANIZED HEALTH CARE EDUCATION/TRAINING PROGRAM
Payer: MEDICARE

## 2023-03-11 DIAGNOSIS — R42 DIZZINESS AND GIDDINESS: ICD-10-CM

## 2023-03-11 PROCEDURE — 70450 CT HEAD/BRAIN W/O DYE: CPT

## 2023-04-05 ENCOUNTER — APPOINTMENT (OUTPATIENT)
Dept: GENERAL RADIOLOGY | Age: 68
End: 2023-04-05
Attending: EMERGENCY MEDICINE
Payer: MEDICARE

## 2023-04-05 ENCOUNTER — HOSPITAL ENCOUNTER (OUTPATIENT)
Age: 68
End: 2023-04-05
Attending: EMERGENCY MEDICINE
Payer: MEDICARE

## 2023-04-05 LAB
ALBUMIN SERPL-MCNC: 4.1 G/DL (ref 3.5–5)
ALBUMIN/GLOB SERPL: 0.9 (ref 1.1–2.2)
ALP SERPL-CCNC: 39 U/L (ref 45–117)
ALT SERPL-CCNC: 25 U/L (ref 12–78)
ANION GAP SERPL CALC-SCNC: 5 MMOL/L (ref 5–15)
AST SERPL-CCNC: 21 U/L (ref 15–37)
ATRIAL RATE: 89 BPM
BASOPHILS # BLD: 0 K/UL (ref 0–0.1)
BASOPHILS NFR BLD: 1 % (ref 0–1)
BILIRUB SERPL-MCNC: 0.5 MG/DL (ref 0.2–1)
BUN SERPL-MCNC: 11 MG/DL (ref 6–20)
BUN/CREAT SERPL: 11 (ref 12–20)
CALCIUM SERPL-MCNC: 9.7 MG/DL (ref 8.5–10.1)
CALCULATED P AXIS, ECG09: 65 DEGREES
CALCULATED R AXIS, ECG10: -40 DEGREES
CALCULATED T AXIS, ECG11: 48 DEGREES
CHLORIDE SERPL-SCNC: 102 MMOL/L (ref 97–108)
CO2 SERPL-SCNC: 28 MMOL/L (ref 21–32)
COMMENT, HOLDF: NORMAL
CREAT SERPL-MCNC: 0.99 MG/DL (ref 0.55–1.02)
DIAGNOSIS, 93000: NORMAL
DIFFERENTIAL METHOD BLD: NORMAL
EOSINOPHIL # BLD: 0.2 K/UL (ref 0–0.4)
EOSINOPHIL NFR BLD: 3 % (ref 0–7)
ERYTHROCYTE [DISTWIDTH] IN BLOOD BY AUTOMATED COUNT: 12 % (ref 11.5–14.5)
GLOBULIN SER CALC-MCNC: 4.4 G/DL (ref 2–4)
GLUCOSE SERPL-MCNC: 159 MG/DL (ref 65–100)
HCT VFR BLD AUTO: 36.6 % (ref 35–47)
HGB BLD-MCNC: 11.9 G/DL (ref 11.5–16)
IMM GRANULOCYTES # BLD AUTO: 0 K/UL (ref 0–0.04)
IMM GRANULOCYTES NFR BLD AUTO: 0 % (ref 0–0.5)
LYMPHOCYTES # BLD: 2.6 K/UL (ref 0.8–3.5)
LYMPHOCYTES NFR BLD: 43 % (ref 12–49)
MCH RBC QN AUTO: 30.8 PG (ref 26–34)
MCHC RBC AUTO-ENTMCNC: 32.5 G/DL (ref 30–36.5)
MCV RBC AUTO: 94.8 FL (ref 80–99)
MONOCYTES # BLD: 0.3 K/UL (ref 0–1)
MONOCYTES NFR BLD: 5 % (ref 5–13)
NEUTS SEG # BLD: 3 K/UL (ref 1.8–8)
NEUTS SEG NFR BLD: 48 % (ref 32–75)
NRBC # BLD: 0 K/UL (ref 0–0.01)
NRBC BLD-RTO: 0 PER 100 WBC
P-R INTERVAL, ECG05: 182 MS
PLATELET # BLD AUTO: 312 K/UL (ref 150–400)
PMV BLD AUTO: 9 FL (ref 8.9–12.9)
POTASSIUM SERPL-SCNC: 3.7 MMOL/L (ref 3.5–5.1)
PROT SERPL-MCNC: 8.5 G/DL (ref 6.4–8.2)
Q-T INTERVAL, ECG07: 368 MS
QRS DURATION, ECG06: 88 MS
QTC CALCULATION (BEZET), ECG08: 447 MS
RBC # BLD AUTO: 3.86 M/UL (ref 3.8–5.2)
SAMPLES BEING HELD,HOLD: NORMAL
SODIUM SERPL-SCNC: 135 MMOL/L (ref 136–145)
TROPONIN I SERPL HS-MCNC: 4 NG/L (ref 0–37)
VENTRICULAR RATE, ECG03: 89 BPM
WBC # BLD AUTO: 6.1 K/UL (ref 3.6–11)

## 2023-04-05 PROCEDURE — 80053 COMPREHEN METABOLIC PANEL: CPT

## 2023-04-05 PROCEDURE — 99285 EMERGENCY DEPT VISIT HI MDM: CPT

## 2023-04-05 PROCEDURE — 36415 COLL VENOUS BLD VENIPUNCTURE: CPT

## 2023-04-05 PROCEDURE — 74011000250 HC RX REV CODE- 250: Performed by: EMERGENCY MEDICINE

## 2023-04-05 PROCEDURE — 93005 ELECTROCARDIOGRAM TRACING: CPT

## 2023-04-05 PROCEDURE — 71046 X-RAY EXAM CHEST 2 VIEWS: CPT

## 2023-04-05 PROCEDURE — 74011250637 HC RX REV CODE- 250/637: Performed by: EMERGENCY MEDICINE

## 2023-04-05 PROCEDURE — 85025 COMPLETE CBC W/AUTO DIFF WBC: CPT

## 2023-04-05 PROCEDURE — 84484 ASSAY OF TROPONIN QUANT: CPT

## 2023-04-05 RX ORDER — GUAIFENESIN 100 MG/5ML
324 LIQUID (ML) ORAL
Status: COMPLETED
Start: 2023-04-05 | End: 2023-04-05

## 2023-04-05 RX ORDER — LIDOCAINE 4 G/100G
1 PATCH TOPICAL
Status: DISCONTINUED
Start: 2023-04-05 | End: 2023-04-05 | Stop reason: HOSPADM

## 2023-04-05 RX ORDER — LIDOCAINE 50 MG/G
PATCH TOPICAL
Qty: 30 EACH | Refills: 0 | Status: SHIPPED
Start: 2023-04-05

## 2023-04-05 RX ADMIN — ASPIRIN 81 MG CHEWABLE TABLET 324 MG: 81 TABLET CHEWABLE at 18:17

## 2023-04-05 NOTE — ED TRIAGE NOTES
Triage: Pt arrives ambulatory from home with CC of chest pain for 5 days. She denies SOB. Reports that pain is worsened by movement of arms, coughing, or deep inspiration. Pt has hx of rheumatic fever, otherwise, denies cardiac hx.

## 2023-04-06 NOTE — ED PROVIDER NOTES
60-year-old female with history as below presents to the emergency department noting a 5-day history of relatively continuous precordial chest pain that started after she mowed the yard and did yard work. She states that the pain is worsened with movement of her arms, coughing or deep inspiration. She denies any leg swelling or tenderness or history of prior DVT/PE. No history of heart disease. She has not taken anything for her symptoms and presents to the emergency department stating that she was expecting it to go away on its own but it has not so she presents for further evaluation. No associated shortness of breath, nausea, palpitations, syncope, lightheadedness or radiation of the pain. Chest Pain   Pertinent negatives include no abdominal pain, no back pain, no cough, no fever, no headaches, no nausea, no numbness, no shortness of breath, no vomiting and no weakness.       Past Medical History:   Diagnosis Date    Diabetes ([address]     GERD (gastroesophageal reflux disease)     Hypercholesterolemia     Hypertension     Rheumatic fever        Past Surgical History:   Procedure Laterality Date    COLONOSCOPY N/A 5/12/2021    COLONOSCOPY   :- performed by Jennyfer Henry MD at [facility]COPY    HX BREAST BIOPSY Right     benign         Family History:   Problem Relation Age of Onset    Heart Disease Mother         CHF    Cancer Father         lung    Heart Disease Brother         premature cad    Cancer Brother         throat    Heart Disease Sister     Colon Cancer Brother     Breast Cancer Sister     Breast Cancer Sister        Social History     Socioeconomic History    Marital status: SINGLE     Spouse name: Not on file    Number of children: Not on file    Years of education: Not on file    Highest education level: Not on file   Occupational History    Occupation:  8-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 Social Determinants of Health     Financial Resource Strain: Not on file   Food Insecurity: Not on file   Transportation Needs: Not on file   Physical Activity: Not on file   Stress: Not on file   Social Connections: Not on file   Intimate Partner Violence: Not on file   Housing Stability: Not on file         ALLERGIES: Patient has no known allergies. Review of Systems   Constitutional:  Negative for activity change, appetite change, chills and fever. HENT:  Negative for congestion, rhinorrhea, sinus pressure, sneezing and sore throat. Eyes:  Negative for photophobia and visual disturbance. Respiratory:  Negative for cough and shortness of breath. Cardiovascular:  Positive for chest pain. Gastrointestinal:  Negative for abdominal pain, blood in stool, constipation, diarrhea, nausea and vomiting. Genitourinary:  Negative for difficulty urinating, dysuria, flank pain, frequency, hematuria, menstrual problem, urgency, vaginal bleeding and vaginal discharge. Musculoskeletal:  Negative for arthralgias, back pain, myalgias and neck pain. Skin:  Negative for rash and wound. Neurological:  Negative for syncope, weakness, numbness and headaches. Psychiatric/Behavioral:  Negative for self-injury and suicidal ideas. All other systems reviewed and are negative. Vitals:    04/05/23 1719 04/05/23 1734   BP: (!) 174/111 (!) 163/85   Pulse: 87 87   Resp: 16 16   Temp: 98.5 °F (36.9 °C)    SpO2: 99% 95%   Weight: 76.1 kg (167 lb 12.3 oz)             Physical Exam  Vitals and nursing note reviewed.    Constitutional: General: She is not in acute distress. Appearance: Normal appearance. She is well-developed. She is not diaphoretic. Comments: Very pleasant, no acute distress, speaking in full sentences. HENT:      Head: Normocephalic and atraumatic. Nose: Nose normal.   Eyes:      Extraocular Movements: Extraocular movements intact. Conjunctiva/sclera: Conjunctivae normal.      Pupils: Pupils are equal, round, and reactive to light. Cardiovascular:      Rate and Rhythm: Normal rate and regular rhythm. Heart sounds: Normal heart sounds. Pulmonary:      Effort: Pulmonary effort is normal.      Breath sounds: Normal breath sounds. Chest:      Chest wall: Tenderness (Parasternal chest wall tenderness without bony crepitus or deformity. No evidence of trauma, no vesicular rash.) present. Abdominal:      General: There is no distension. Palpations: Abdomen is soft. Tenderness: There is no abdominal tenderness. Musculoskeletal:         General: No tenderness. Cervical back: Neck supple. Right lower leg: No edema. Left lower leg: No edema. Skin:     General: Skin is warm and dry. Neurological:      General: No focal deficit present. Mental Status: She is alert and oriented to person, place, and time. Cranial Nerves: No cranial nerve deficit. Sensory: No sensory deficit. Motor: No weakness. Coordination: Coordination normal.        Medical Decision Making  Amount and/or Complexity of Data Reviewed  Labs: ordered. Radiology: ordered. ECG/medicine tests: ordered. Risk  OTC drugs. Prescription drug management. ED Course as of 04/06/23 1427   Wed Apr 05, 2023   1708 This EKG was interpreted by me at 455. Normal sinus rhythm at 89 bpm.  Left axis deviation. Q waves present in V1 and V2.   No other significant ST segment abnormalities [JT]      ED Course User Index  [JT] Daysi Grimes MD     Well-appearing 27-year-old female presents with reproducible chest pain x5 days. She is afebrile with vital signs stable satting normally on room air. CXR viewed by myself and read by radiology showing no acute abnormalities. Labs returned very reassuringly showing no significant abnormalities. Negative troponin. Reassurance was given, very low suspicion for ACS, dissection, PE, or any other acute intrathoracic emergencies at this time. Highly suspect costochondritis/musculoskeletal chest pain as etiology given history and exam.  She was given dose of aspirin and the ED and lidocaine patches and recommended lidocaine patches and over-the-counter medications for further symptomatic relief. Recommended PCP follow-up for further evaluation and return precautions were given for worsening or concerns. This plan was discussed with the patient at the bedside and she stated with understanding and agreement. Please note that this dictation was completed with QVPN, the computer voice recognition software. Quite often unanticipated grammatical, syntax, homophones, and other interpretive errors are inadvertently transcribed by the computer software. Please disregard these errors. Please excuse any errors that have escaped final proofreading.     Procedures

## 2023-05-08 RX ORDER — AMLODIPINE BESYLATE 5 MG/1
TABLET ORAL
Qty: 90 TABLET | Refills: 3 | Status: SHIPPED | OUTPATIENT
Start: 2023-05-08

## 2023-05-08 RX ORDER — LISINOPRIL AND HYDROCHLOROTHIAZIDE 25; 20 MG/1; MG/1
1 TABLET ORAL DAILY
Qty: 90 TABLET | Refills: 3 | Status: SHIPPED | OUTPATIENT
Start: 2023-05-08

## 2023-05-08 RX ORDER — ROSUVASTATIN CALCIUM 20 MG/1
TABLET, COATED ORAL
Qty: 90 TABLET | Refills: 3 | Status: SHIPPED | OUTPATIENT
Start: 2023-05-08

## 2023-05-08 NOTE — TELEPHONE ENCOUNTER
Last appointment: 3/6/23 with MD Edgar Daniel  Next appointment: 6/6/23 with MD Stacy  Previous refill encounter(s): 5/16/22 #90 with 3 refills    Requested Prescriptions     Pending Prescriptions Disp Refills    lisinopril-hydroCHLOROthiazide (PRINZIDE;ZESTORETIC) 20-25 MG per tablet [Pharmacy Med Name: LISINOPRIL-HCTZ 20/25MG TABLETS] 90 tablet 3     Sig: TAKE 1 TABLET BY MOUTH DAILY    amLODIPine (NORVASC) 5 MG tablet [Pharmacy Med Name: AMLODIPINE BESYLATE 5MG TABLETS] 90 tablet 3     Sig: TAKE 1 TABLET BY MOUTH DAILY FOR HIGH BLOOD PRESSURE    rosuvastatin (CRESTOR) 20 MG tablet [Pharmacy Med Name: ROSUVASTATIN 20MG TABLETS] 90 tablet 3     Sig: TAKE 1 TABLET BY MOUTH AT NIGHT

## 2023-06-06 ENCOUNTER — OFFICE VISIT (OUTPATIENT)
Age: 68
End: 2023-06-06
Payer: MEDICARE

## 2023-06-06 VITALS
HEIGHT: 64 IN | DIASTOLIC BLOOD PRESSURE: 71 MMHG | RESPIRATION RATE: 16 BRPM | TEMPERATURE: 98 F | OXYGEN SATURATION: 98 % | SYSTOLIC BLOOD PRESSURE: 136 MMHG | WEIGHT: 163 LBS | BODY MASS INDEX: 27.83 KG/M2 | HEART RATE: 86 BPM

## 2023-06-06 DIAGNOSIS — G89.29 CHRONIC PAIN OF RIGHT KNEE: ICD-10-CM

## 2023-06-06 DIAGNOSIS — M25.561 CHRONIC PAIN OF RIGHT KNEE: ICD-10-CM

## 2023-06-06 DIAGNOSIS — Z79.4 DIABETES MELLITUS DUE TO UNDERLYING CONDITION WITH DIABETIC NEUROPATHY, WITH LONG-TERM CURRENT USE OF INSULIN (HCC): Primary | ICD-10-CM

## 2023-06-06 DIAGNOSIS — E08.40 DIABETES MELLITUS DUE TO UNDERLYING CONDITION WITH DIABETIC NEUROPATHY, WITH LONG-TERM CURRENT USE OF INSULIN (HCC): Primary | ICD-10-CM

## 2023-06-06 DIAGNOSIS — Z91.81 AT HIGH RISK FOR FALLS: ICD-10-CM

## 2023-06-06 LAB — HBA1C MFR BLD: 9.4 %

## 2023-06-06 PROCEDURE — 83036 HEMOGLOBIN GLYCOSYLATED A1C: CPT | Performed by: STUDENT IN AN ORGANIZED HEALTH CARE EDUCATION/TRAINING PROGRAM

## 2023-06-06 PROCEDURE — 99213 OFFICE O/P EST LOW 20 MIN: CPT | Performed by: STUDENT IN AN ORGANIZED HEALTH CARE EDUCATION/TRAINING PROGRAM

## 2023-06-06 PROCEDURE — 1123F ACP DISCUSS/DSCN MKR DOCD: CPT | Performed by: STUDENT IN AN ORGANIZED HEALTH CARE EDUCATION/TRAINING PROGRAM

## 2023-06-06 PROCEDURE — 3078F DIAST BP <80 MM HG: CPT | Performed by: STUDENT IN AN ORGANIZED HEALTH CARE EDUCATION/TRAINING PROGRAM

## 2023-06-06 PROCEDURE — PBSHW AMB POC HEMOGLOBIN A1C: Performed by: STUDENT IN AN ORGANIZED HEALTH CARE EDUCATION/TRAINING PROGRAM

## 2023-06-06 PROCEDURE — 3075F SYST BP GE 130 - 139MM HG: CPT | Performed by: STUDENT IN AN ORGANIZED HEALTH CARE EDUCATION/TRAINING PROGRAM

## 2023-06-06 RX ORDER — INSULIN GLARGINE 100 [IU]/ML
25 INJECTION, SOLUTION SUBCUTANEOUS NIGHTLY
Qty: 18 ML | Refills: 2 | Status: SHIPPED | OUTPATIENT
Start: 2023-06-06

## 2023-06-06 SDOH — ECONOMIC STABILITY: FOOD INSECURITY: WITHIN THE PAST 12 MONTHS, THE FOOD YOU BOUGHT JUST DIDN'T LAST AND YOU DIDN'T HAVE MONEY TO GET MORE.: NEVER TRUE

## 2023-06-06 SDOH — ECONOMIC STABILITY: INCOME INSECURITY: IN THE LAST 12 MONTHS, WAS THERE A TIME WHEN YOU WERE NOT ABLE TO PAY THE MORTGAGE OR RENT ON TIME?: NO

## 2023-06-06 SDOH — ECONOMIC STABILITY: HOUSING INSECURITY: IN THE LAST 12 MONTHS, HOW MANY PLACES HAVE YOU LIVED?: 0

## 2023-06-06 SDOH — ECONOMIC STABILITY: FOOD INSECURITY: WITHIN THE PAST 12 MONTHS, YOU WORRIED THAT YOUR FOOD WOULD RUN OUT BEFORE YOU GOT MONEY TO BUY MORE.: NEVER TRUE

## 2023-06-06 SDOH — ECONOMIC STABILITY: HOUSING INSECURITY
IN THE LAST 12 MONTHS, WAS THERE A TIME WHEN YOU DID NOT HAVE A STEADY PLACE TO SLEEP OR SLEPT IN A SHELTER (INCLUDING NOW)?: NO

## 2023-06-06 SDOH — ECONOMIC STABILITY: TRANSPORTATION INSECURITY
IN THE PAST 12 MONTHS, HAS THE LACK OF TRANSPORTATION KEPT YOU FROM MEDICAL APPOINTMENTS OR FROM GETTING MEDICATIONS?: NO

## 2023-06-06 SDOH — ECONOMIC STABILITY: TRANSPORTATION INSECURITY
IN THE PAST 12 MONTHS, HAS LACK OF TRANSPORTATION KEPT YOU FROM MEETINGS, WORK, OR FROM GETTING THINGS NEEDED FOR DAILY LIVING?: NO

## 2023-06-06 SDOH — HEALTH STABILITY: PHYSICAL HEALTH: ON AVERAGE, HOW MANY MINUTES DO YOU ENGAGE IN EXERCISE AT THIS LEVEL?: 60 MIN

## 2023-06-06 SDOH — ECONOMIC STABILITY: INCOME INSECURITY: HOW HARD IS IT FOR YOU TO PAY FOR THE VERY BASICS LIKE FOOD, HOUSING, MEDICAL CARE, AND HEATING?: NOT HARD AT ALL

## 2023-06-06 SDOH — HEALTH STABILITY: PHYSICAL HEALTH: ON AVERAGE, HOW MANY DAYS PER WEEK DO YOU ENGAGE IN MODERATE TO STRENUOUS EXERCISE (LIKE A BRISK WALK)?: 3 DAYS

## 2023-06-06 SDOH — HEALTH STABILITY: PHYSICAL HEALTH: ON AVERAGE, HOW MANY DAYS PER WEEK DO YOU ENGAGE IN MODERATE TO STRENUOUS EXERCISE (LIKE A BRISK WALK)?: 5 DAYS

## 2023-06-06 ASSESSMENT — SOCIAL DETERMINANTS OF HEALTH (SDOH)
WITHIN THE LAST YEAR, HAVE YOU BEEN AFRAID OF YOUR PARTNER OR EX-PARTNER?: NO
WITHIN THE LAST YEAR, HAVE YOU BEEN HUMILIATED OR EMOTIONALLY ABUSED IN OTHER WAYS BY YOUR PARTNER OR EX-PARTNER?: NO
IN A TYPICAL WEEK, HOW MANY TIMES DO YOU TALK ON THE PHONE WITH FAMILY, FRIENDS, OR NEIGHBORS?: MORE THAN THREE TIMES A WEEK
DO YOU BELONG TO ANY CLUBS OR ORGANIZATIONS SUCH AS CHURCH GROUPS UNIONS, FRATERNAL OR ATHLETIC GROUPS, OR SCHOOL GROUPS?: NO
WITHIN THE LAST YEAR, HAVE YOU BEEN KICKED, HIT, SLAPPED, OR OTHERWISE PHYSICALLY HURT BY YOUR PARTNER OR EX-PARTNER?: NO
HOW OFTEN DO YOU GET TOGETHER WITH FRIENDS OR RELATIVES?: MORE THAN THREE TIMES A WEEK
HOW OFTEN DO YOU ATTEND CHURCH OR RELIGIOUS SERVICES?: 1 TO 4 TIMES PER YEAR
WITHIN THE LAST YEAR, HAVE TO BEEN RAPED OR FORCED TO HAVE ANY KIND OF SEXUAL ACTIVITY BY YOUR PARTNER OR EX-PARTNER?: NO
HOW OFTEN DO YOU ATTENT MEETINGS OF THE CLUB OR ORGANIZATION YOU BELONG TO?: NEVER

## 2023-06-06 ASSESSMENT — LIFESTYLE VARIABLES
HOW MANY STANDARD DRINKS CONTAINING ALCOHOL DO YOU HAVE ON A TYPICAL DAY: PATIENT DOES NOT DRINK
HOW OFTEN DO YOU HAVE A DRINK CONTAINING ALCOHOL: MONTHLY OR LESS

## 2023-06-06 ASSESSMENT — PATIENT HEALTH QUESTIONNAIRE - PHQ9
2. FEELING DOWN, DEPRESSED OR HOPELESS: 0
SUM OF ALL RESPONSES TO PHQ9 QUESTIONS 1 & 2: 0
1. LITTLE INTEREST OR PLEASURE IN DOING THINGS: 0
SUM OF ALL RESPONSES TO PHQ QUESTIONS 1-9: 0

## 2023-06-06 NOTE — PROGRESS NOTES
Name and Date of Birth Verified    Pharmacy Verified    Chief Complaint   Patient presents with    Follow-up     3/6/2023 Diabetes    Knee Pain     X 2-3 weeks       Patient stated she has had problems with her knee before, denies any injury. 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 Pain scale 9 out of 10    1. \"Have you been to the ER, urgent care clinic since your last visit? Hospitalized since your last visit? \"     Yes 4/5/2023 [facility]    2. \"Have you seen or consulted any other health care providers outside of the [address] [place] [address] since your last visit? \" No     3. For patients aged 39-70: Has the patient had a colonoscopy / FIT/ Cologuard? Yes 5/12/2021      If the patient is female:    4. For patients aged 41-77: Has the patient had a mammogram within the past 2 years? Yes 4/5/2023      5. For patients aged 21-65: Has the patient had a pap smear?  Yes 5/2023     Health Maintenance Due   Topic Date Due    Pneumococcal 65+ years Vaccine (2 - PCV) 05/11/2022    Diabetic foot exam  05/16/2023

## 2023-06-06 NOTE — PROGRESS NOTES
[address] [facility]  [address] [place], [address]  [phone]    Chief Complaint: DM and HTN    Subjective  Chel Durham is a 80 y.o. Black / [place] American female , established patient, here for evaluation of the concern(s) above;    Diabetes:  Noncompliant with medications. States that she is busy taking care of her brother. Admits to forgetting to take her meds on some days. Suppose to be on Lantus 15 units AM, Metformin 1g daily (although prescribes twice daily), and Trulicity 3mg weekly. Patient states that she has been missing some doses as she tries to take care of her ailing brother. Also admits that she has been eating a lot of candies. Pt was seen in the ER for chest pain and workup was unremarkable. Pt states that the pain is gone. Pt denies any  fever, chill, chest pain, SOB, abdominal pain, n/v/d, HA or dizziness. 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 Marital status: Patient has 2 grown children with grandkids. Allergies - reviewed:   No Known Allergies    Past Medical History - reviewed:  Past Medical History:   Diagnosis Date    Diabetes ([address]     GERD (gastroesophageal reflux disease)     Hypercholesterolemia     Hypertension     Rheumatic fever        Depression screening:  PHQ-9 Total Score: 0 (6/6/2023 10:11 AM)      Review of systems:   A comprehensive review of systems was negative except for that written in the History of Present Illness. Physical Exam  /71 (Site: Left Upper Arm, Position: Sitting, Cuff Size: Medium Adult)   Pulse 86   Temp 98 °F (36.7 °C) (Oral)   Resp 16   Ht 5' 4\" (1.626 m)   Wt 163 lb (73.9 kg)   SpO2 98%   BMI 27.98 kg/m²     General: Alert and oriented, in no acute distress.   LUNGS: Respirations unlabored;

## 2023-06-19 DIAGNOSIS — M25.561 RIGHT KNEE PAIN, UNSPECIFIED CHRONICITY: Primary | ICD-10-CM

## 2023-06-22 ENCOUNTER — OFFICE VISIT (OUTPATIENT)
Age: 68
End: 2023-06-22

## 2023-06-22 ENCOUNTER — HOSPITAL ENCOUNTER (OUTPATIENT)
Facility: HOSPITAL | Age: 68
Discharge: HOME | End: 2023-06-22
Payer: MEDICARE

## 2023-06-22 VITALS
WEIGHT: 169.2 LBS | BODY MASS INDEX: 28.89 KG/M2 | RESPIRATION RATE: 17 BRPM | HEART RATE: 47 BPM | HEIGHT: 64 IN | DIASTOLIC BLOOD PRESSURE: 63 MMHG | SYSTOLIC BLOOD PRESSURE: 139 MMHG | TEMPERATURE: 97.4 F | OXYGEN SATURATION: 100 %

## 2023-06-22 DIAGNOSIS — M25.561 RIGHT KNEE PAIN, UNSPECIFIED CHRONICITY: ICD-10-CM

## 2023-06-22 DIAGNOSIS — M17.11 UNILATERAL PRIMARY OSTEOARTHRITIS, RIGHT KNEE: Primary | ICD-10-CM

## 2023-06-22 PROCEDURE — 73564 X-RAY EXAM KNEE 4 OR MORE: CPT

## 2023-06-22 ASSESSMENT — PATIENT HEALTH QUESTIONNAIRE - PHQ9
SUM OF ALL RESPONSES TO PHQ9 QUESTIONS 1 & 2: 0
SUM OF ALL RESPONSES TO PHQ QUESTIONS 1-9: 0
1. LITTLE INTEREST OR PLEASURE IN DOING THINGS: 0
2. FEELING DOWN, DEPRESSED OR HOPELESS: 0
SUM OF ALL RESPONSES TO PHQ QUESTIONS 1-9: 0

## 2023-06-22 NOTE — PROGRESS NOTES
6/22/2023    Chief Complaint: Right knee pain    Assessment: Osteoarthritis right knee    Plan: This patient and I did discuss the many options in treating knee osteoarthritis. We did discuss that we could continue to seek out nonoperative modalities, such as: NSAIDs, oral and topical analgesics, knee injections, knee braces, physical therapy, stretching, strengthening, and weight loss strategies, activity modification, ambulatory assistive devices. The patient stated their understanding with this, but and would like to proceed with nonsurgical management in the form of bracing, given today. HPI: This is a 80 y.o. female who complains of right knee pain. Onset was gradual.  The patient has had activity dependent pain for several months. The patient has tried activity modification, physical therapy exercises, injections have not been done. The pain is in the lateral knee, it is moderate in intensity. The patient feels unstable with the knee, fears falling, and has significant limitation with activities of daily living, recreation, and walks with a limp.     Past Medical History:   Diagnosis Date    Diabetes ([address]     GERD (gastroesophageal reflux disease)     Hypercholesterolemia     Hypertension     Rheumatic fever        Past Surgical History:   Procedure Laterality Date    BREAST BIOPSY Right     benign    COLONOSCOPY N/A 5/12/2021    COLONOSCOPY   :- performed by Ata White MD at [facility] ENDOSCOPY       Current Outpatient Medications on File Prior to Visit   Medication Sig Dispense Refill    insulin glargine (LANTUS SOLOSTAR) 100 UNIT/ML injection pen Inject 25 Units into the skin nightly 18 mL 2    lisinopril-hydroCHLOROthiazide (PRINZIDE;ZESTORETIC) 20-25 MG per tablet TAKE 1 TABLET BY MOUTH DAILY 90 tablet 3    amLODIPine (NORVASC) 5 MG tablet TAKE 1 TABLET BY MOUTH DAILY FOR HIGH BLOOD PRESSURE 90 tablet 3    rosuvastatin (CRESTOR) 20 MG tablet TAKE 1 TABLET BY MOUTH AT NIGHT 90 tablet 3

## 2023-06-22 NOTE — PROGRESS NOTES
[unfilled]  Nilesh Laurence is a 80 y.o. female  Chief Complaint   Patient presents with    Knee Pain     Right Knee     Ht 5' 4\" (1.626 m)   Wt 169 lb 3.2 oz (76.7 kg)   BMI 29.04 kg/m²   1. Have you been to the ER, urgent care clinic since your last visit? Hospitalized since your last visit? No    2. Have you seen or consulted any other health care providers outside of the [address] [place] [address] since your last visit? Include any pap smears or colon screening.  No

## 2023-08-04 RX ORDER — DULAGLUTIDE 3 MG/.5ML
INJECTION, SOLUTION SUBCUTANEOUS
Qty: 6 ML | Refills: 3 | Status: SHIPPED | OUTPATIENT
Start: 2023-08-04

## 2023-08-04 NOTE — TELEPHONE ENCOUNTER
Last appointment: 6/6/23  Next appointment: Tony Vega to follow-up 9/6/23  Previous refill encounter(s): 5/16/22 #3 with 2 refills    Requested Prescriptions     Pending Prescriptions Disp Refills    [facility] [Pharmacy Med Name: Мария Sotelo 3DE/0.9PC SDP 0.5ML] 6 mL 3     Sig: INJECT 3 MG SUBCUTANEOUSLY EVERY 7 DAYS         For Pharmacy Admin Tracking Only    Program: Medication Refill  CPA in place:    Recommendation Provided To:    Intervention Detail: New Rx: 1, reason: Patient Preference  Intervention Accepted By:   Lester Newberry Closed?:    Time Spent (min): 5

## 2023-08-23 LAB — HBA1C MFR BLD HPLC: 9.5 %

## 2024-01-31 LAB
ESTIMATED AVERAGE GLUCOSE: NORMAL
HBA1C MFR BLD: 13.2 %

## 2024-07-30 RX ORDER — ROSUVASTATIN CALCIUM 20 MG/1
20 TABLET, COATED ORAL NIGHTLY
Qty: 90 TABLET | Refills: 0 | OUTPATIENT
Start: 2024-07-30

## 2024-07-30 RX ORDER — LISINOPRIL AND HYDROCHLOROTHIAZIDE 20; 25 MG/1; MG/1
1 TABLET ORAL DAILY
Qty: 90 TABLET | Refills: 0 | OUTPATIENT
Start: 2024-07-30

## 2024-07-30 RX ORDER — AMLODIPINE BESYLATE 5 MG/1
5 TABLET ORAL DAILY
Qty: 90 TABLET | Refills: 0 | OUTPATIENT
Start: 2024-07-30